# Patient Record
Sex: FEMALE | Race: WHITE | Employment: OTHER | URBAN - METROPOLITAN AREA
[De-identification: names, ages, dates, MRNs, and addresses within clinical notes are randomized per-mention and may not be internally consistent; named-entity substitution may affect disease eponyms.]

---

## 2018-07-30 ENCOUNTER — HOSPITAL ENCOUNTER (OUTPATIENT)
Dept: CT IMAGING | Age: 83
Discharge: HOME | End: 2018-07-30
Payer: MEDICARE

## 2018-07-30 DIAGNOSIS — I73.9 PVD (PERIPHERAL VASCULAR DISEASE) (HCC): ICD-10-CM

## 2018-07-30 LAB
BUN BLDV-MCNC: 14 MG/DL (ref 8–23)
CREAT SERPL-MCNC: 0.8 MG/DL (ref 0.5–1)
GFR AFRICAN AMERICAN: >60
GFR NON-AFRICAN AMERICAN: >60 ML/MIN/1.73

## 2018-07-30 PROCEDURE — 84520 ASSAY OF UREA NITROGEN: CPT

## 2018-07-30 PROCEDURE — 6360000004 HC RX CONTRAST MEDICATION: Performed by: RADIOLOGY

## 2018-07-30 PROCEDURE — 36415 COLL VENOUS BLD VENIPUNCTURE: CPT

## 2018-07-30 PROCEDURE — 75635 CT ANGIO ABDOMINAL ARTERIES: CPT

## 2018-07-30 PROCEDURE — 82565 ASSAY OF CREATININE: CPT

## 2018-07-30 RX ADMIN — IOPAMIDOL 150 ML: 755 INJECTION, SOLUTION INTRAVENOUS at 11:22

## 2018-11-16 ENCOUNTER — APPOINTMENT (OUTPATIENT)
Dept: GENERAL RADIOLOGY | Age: 83
DRG: 190 | End: 2018-11-16
Payer: MEDICARE

## 2018-11-16 ENCOUNTER — HOSPITAL ENCOUNTER (INPATIENT)
Age: 83
LOS: 1 days | Discharge: HOME | DRG: 190 | End: 2018-11-19
Attending: EMERGENCY MEDICINE | Admitting: INTERNAL MEDICINE
Payer: MEDICARE

## 2018-11-16 DIAGNOSIS — R09.02 HYPOXIA: ICD-10-CM

## 2018-11-16 DIAGNOSIS — J44.1 COPD EXACERBATION (HCC): Primary | ICD-10-CM

## 2018-11-16 LAB
ANION GAP SERPL CALCULATED.3IONS-SCNC: 14 MMOL/L (ref 7–16)
BASOPHILS ABSOLUTE: 0.04 E9/L (ref 0–0.2)
BASOPHILS RELATIVE PERCENT: 0.4 % (ref 0–2)
BUN BLDV-MCNC: 9 MG/DL (ref 8–23)
CALCIUM SERPL-MCNC: 9.4 MG/DL (ref 8.6–10.2)
CHLORIDE BLD-SCNC: 97 MMOL/L (ref 98–107)
CO2: 29 MMOL/L (ref 22–29)
CREAT SERPL-MCNC: 0.8 MG/DL (ref 0.5–1)
D DIMER: 233 NG/ML DDU
EOSINOPHILS ABSOLUTE: 0.08 E9/L (ref 0.05–0.5)
EOSINOPHILS RELATIVE PERCENT: 0.8 % (ref 0–6)
GFR AFRICAN AMERICAN: >60
GFR NON-AFRICAN AMERICAN: >60 ML/MIN/1.73
GLUCOSE BLD-MCNC: 123 MG/DL (ref 74–99)
HCT VFR BLD CALC: 41.7 % (ref 34–48)
HEMOGLOBIN: 13.9 G/DL (ref 11.5–15.5)
IMMATURE GRANULOCYTES #: 0.03 E9/L
IMMATURE GRANULOCYTES %: 0.3 % (ref 0–5)
LYMPHOCYTES ABSOLUTE: 1.83 E9/L (ref 1.5–4)
LYMPHOCYTES RELATIVE PERCENT: 18.1 % (ref 20–42)
MAGNESIUM: 1.4 MG/DL (ref 1.6–2.6)
MCH RBC QN AUTO: 30.6 PG (ref 26–35)
MCHC RBC AUTO-ENTMCNC: 33.3 % (ref 32–34.5)
MCV RBC AUTO: 91.9 FL (ref 80–99.9)
MONOCYTES ABSOLUTE: 0.71 E9/L (ref 0.1–0.95)
MONOCYTES RELATIVE PERCENT: 7 % (ref 2–12)
NEUTROPHILS ABSOLUTE: 7.4 E9/L (ref 1.8–7.3)
NEUTROPHILS RELATIVE PERCENT: 73.4 % (ref 43–80)
PDW BLD-RTO: 12.3 FL (ref 11.5–15)
PLATELET # BLD: 235 E9/L (ref 130–450)
PMV BLD AUTO: 10.6 FL (ref 7–12)
POTASSIUM REFLEX MAGNESIUM: 3.1 MMOL/L (ref 3.5–5)
RBC # BLD: 4.54 E12/L (ref 3.5–5.5)
SODIUM BLD-SCNC: 140 MMOL/L (ref 132–146)
TROPONIN: <0.01 NG/ML (ref 0–0.03)
WBC # BLD: 10.1 E9/L (ref 4.5–11.5)

## 2018-11-16 PROCEDURE — 85378 FIBRIN DEGRADE SEMIQUANT: CPT

## 2018-11-16 PROCEDURE — 83735 ASSAY OF MAGNESIUM: CPT

## 2018-11-16 PROCEDURE — 85025 COMPLETE CBC W/AUTO DIFF WBC: CPT

## 2018-11-16 PROCEDURE — 96365 THER/PROPH/DIAG IV INF INIT: CPT

## 2018-11-16 PROCEDURE — 6360000002 HC RX W HCPCS: Performed by: STUDENT IN AN ORGANIZED HEALTH CARE EDUCATION/TRAINING PROGRAM

## 2018-11-16 PROCEDURE — G0378 HOSPITAL OBSERVATION PER HR: HCPCS

## 2018-11-16 PROCEDURE — 2580000003 HC RX 258: Performed by: INTERNAL MEDICINE

## 2018-11-16 PROCEDURE — 6370000000 HC RX 637 (ALT 250 FOR IP): Performed by: STUDENT IN AN ORGANIZED HEALTH CARE EDUCATION/TRAINING PROGRAM

## 2018-11-16 PROCEDURE — 6370000000 HC RX 637 (ALT 250 FOR IP): Performed by: INTERNAL MEDICINE

## 2018-11-16 PROCEDURE — 96375 TX/PRO/DX INJ NEW DRUG ADDON: CPT

## 2018-11-16 PROCEDURE — 36415 COLL VENOUS BLD VENIPUNCTURE: CPT

## 2018-11-16 PROCEDURE — 94644 CONT INHLJ TX 1ST HOUR: CPT

## 2018-11-16 PROCEDURE — 71046 X-RAY EXAM CHEST 2 VIEWS: CPT

## 2018-11-16 PROCEDURE — 94640 AIRWAY INHALATION TREATMENT: CPT

## 2018-11-16 PROCEDURE — 80048 BASIC METABOLIC PNL TOTAL CA: CPT

## 2018-11-16 PROCEDURE — 99285 EMERGENCY DEPT VISIT HI MDM: CPT

## 2018-11-16 PROCEDURE — 94761 N-INVAS EAR/PLS OXIMETRY MLT: CPT

## 2018-11-16 PROCEDURE — 2580000003 HC RX 258: Performed by: EMERGENCY MEDICINE

## 2018-11-16 PROCEDURE — 6360000002 HC RX W HCPCS: Performed by: INTERNAL MEDICINE

## 2018-11-16 PROCEDURE — 84484 ASSAY OF TROPONIN QUANT: CPT

## 2018-11-16 RX ORDER — POTASSIUM CHLORIDE 7.45 MG/ML
10 INJECTION INTRAVENOUS ONCE
Status: COMPLETED | OUTPATIENT
Start: 2018-11-16 | End: 2018-11-16

## 2018-11-16 RX ORDER — AMLODIPINE BESYLATE 5 MG/1
5 TABLET ORAL DAILY
Status: DISCONTINUED | OUTPATIENT
Start: 2018-11-17 | End: 2018-11-19 | Stop reason: HOSPADM

## 2018-11-16 RX ORDER — VITAMIN E 268 MG
400 CAPSULE ORAL DAILY
Status: DISCONTINUED | OUTPATIENT
Start: 2018-11-17 | End: 2018-11-19 | Stop reason: HOSPADM

## 2018-11-16 RX ORDER — METHYLPREDNISOLONE SODIUM SUCCINATE 125 MG/2ML
125 INJECTION, POWDER, LYOPHILIZED, FOR SOLUTION INTRAMUSCULAR; INTRAVENOUS ONCE
Status: COMPLETED | OUTPATIENT
Start: 2018-11-16 | End: 2018-11-16

## 2018-11-16 RX ORDER — M-VIT,TX,IRON,MINS/CALC/FOLIC 27MG-0.4MG
1 TABLET ORAL DAILY
Status: DISCONTINUED | OUTPATIENT
Start: 2018-11-17 | End: 2018-11-19 | Stop reason: HOSPADM

## 2018-11-16 RX ORDER — EZETIMIBE 10 MG/1
10 TABLET ORAL DAILY
Status: DISCONTINUED | OUTPATIENT
Start: 2018-11-17 | End: 2018-11-19 | Stop reason: HOSPADM

## 2018-11-16 RX ORDER — METHYLPREDNISOLONE SODIUM SUCCINATE 40 MG/ML
40 INJECTION, POWDER, LYOPHILIZED, FOR SOLUTION INTRAMUSCULAR; INTRAVENOUS DAILY
Status: DISCONTINUED | OUTPATIENT
Start: 2018-11-17 | End: 2018-11-19

## 2018-11-16 RX ORDER — CLOPIDOGREL BISULFATE 75 MG/1
75 TABLET ORAL DAILY
Status: DISCONTINUED | OUTPATIENT
Start: 2018-11-17 | End: 2018-11-19 | Stop reason: HOSPADM

## 2018-11-16 RX ORDER — POTASSIUM CHLORIDE 20 MEQ/1
40 TABLET, EXTENDED RELEASE ORAL ONCE
Status: COMPLETED | OUTPATIENT
Start: 2018-11-16 | End: 2018-11-16

## 2018-11-16 RX ORDER — IPRATROPIUM BROMIDE AND ALBUTEROL SULFATE 2.5; .5 MG/3ML; MG/3ML
3 SOLUTION RESPIRATORY (INHALATION) ONCE
Status: COMPLETED | OUTPATIENT
Start: 2018-11-16 | End: 2018-11-16

## 2018-11-16 RX ORDER — IPRATROPIUM BROMIDE AND ALBUTEROL SULFATE 2.5; .5 MG/3ML; MG/3ML
1 SOLUTION RESPIRATORY (INHALATION) EVERY 6 HOURS
Status: DISCONTINUED | OUTPATIENT
Start: 2018-11-16 | End: 2018-11-19 | Stop reason: HOSPADM

## 2018-11-16 RX ORDER — MAGNESIUM SULFATE IN WATER 40 MG/ML
2 INJECTION, SOLUTION INTRAVENOUS ONCE
Status: COMPLETED | OUTPATIENT
Start: 2018-11-16 | End: 2018-11-16

## 2018-11-16 RX ORDER — FERROUS SULFATE 325(65) MG
325 TABLET ORAL
Status: DISCONTINUED | OUTPATIENT
Start: 2018-11-17 | End: 2018-11-19 | Stop reason: HOSPADM

## 2018-11-16 RX ORDER — HYDROCHLOROTHIAZIDE 25 MG/1
25 TABLET ORAL DAILY
Status: DISCONTINUED | OUTPATIENT
Start: 2018-11-17 | End: 2018-11-19 | Stop reason: HOSPADM

## 2018-11-16 RX ORDER — ATENOLOL 50 MG/1
100 TABLET ORAL DAILY
Status: DISCONTINUED | OUTPATIENT
Start: 2018-11-17 | End: 2018-11-19 | Stop reason: HOSPADM

## 2018-11-16 RX ORDER — 0.9 % SODIUM CHLORIDE 0.9 %
500 INTRAVENOUS SOLUTION INTRAVENOUS ONCE
Status: COMPLETED | OUTPATIENT
Start: 2018-11-16 | End: 2018-11-16

## 2018-11-16 RX ORDER — ATENOLOL AND CHLORTHALIDONE TABLET 100; 25 MG/1; MG/1
1 TABLET ORAL DAILY
Status: DISCONTINUED | OUTPATIENT
Start: 2018-11-17 | End: 2018-11-16 | Stop reason: CLARIF

## 2018-11-16 RX ORDER — POTASSIUM CHLORIDE 20 MEQ/1
20 TABLET, EXTENDED RELEASE ORAL
Status: DISCONTINUED | OUTPATIENT
Start: 2018-11-16 | End: 2018-11-19 | Stop reason: HOSPADM

## 2018-11-16 RX ORDER — MAGNESIUM SULFATE IN WATER 40 MG/ML
2 INJECTION, SOLUTION INTRAVENOUS ONCE
Status: DISCONTINUED | OUTPATIENT
Start: 2018-11-16 | End: 2018-11-16

## 2018-11-16 RX ORDER — MAGNESIUM SULFATE HEPTAHYDRATE 500 MG/ML
2 INJECTION, SOLUTION INTRAMUSCULAR; INTRAVENOUS ONCE
Status: DISCONTINUED | OUTPATIENT
Start: 2018-11-16 | End: 2018-11-16

## 2018-11-16 RX ORDER — OYSTER SHELL CALCIUM WITH VITAMIN D 500; 200 MG/1; [IU]/1
1 TABLET, FILM COATED ORAL DAILY
Status: DISCONTINUED | OUTPATIENT
Start: 2018-11-17 | End: 2018-11-19 | Stop reason: HOSPADM

## 2018-11-16 RX ADMIN — POTASSIUM CHLORIDE 40 MEQ: 20 TABLET, EXTENDED RELEASE ORAL at 18:16

## 2018-11-16 RX ADMIN — SODIUM CHLORIDE 500 ML: 9 INJECTION, SOLUTION INTRAVENOUS at 18:49

## 2018-11-16 RX ADMIN — POTASSIUM CHLORIDE 10 MEQ: 7.46 INJECTION, SOLUTION INTRAVENOUS at 18:49

## 2018-11-16 RX ADMIN — CEFTRIAXONE SODIUM 1 G: 1 INJECTION, POWDER, FOR SOLUTION INTRAMUSCULAR; INTRAVENOUS at 22:28

## 2018-11-16 RX ADMIN — METHYLPREDNISOLONE SODIUM SUCCINATE 125 MG: 125 INJECTION, POWDER, FOR SOLUTION INTRAMUSCULAR; INTRAVENOUS at 16:44

## 2018-11-16 RX ADMIN — IPRATROPIUM BROMIDE AND ALBUTEROL SULFATE 1 AMPULE: .5; 3 SOLUTION RESPIRATORY (INHALATION) at 22:46

## 2018-11-16 RX ADMIN — MAGNESIUM SULFATE IN WATER 2 G: 40 INJECTION, SOLUTION INTRAVENOUS at 18:16

## 2018-11-16 RX ADMIN — IPRATROPIUM BROMIDE AND ALBUTEROL SULFATE 3 AMPULE: .5; 3 SOLUTION RESPIRATORY (INHALATION) at 15:53

## 2018-11-16 ASSESSMENT — PAIN SCALES - GENERAL: PAINLEVEL_OUTOF10: 0

## 2018-11-16 ASSESSMENT — ENCOUNTER SYMPTOMS
CONSTIPATION: 0
WHEEZING: 1
VOMITING: 0
CHEST TIGHTNESS: 0
DIARRHEA: 0
BLOOD IN STOOL: 0
ABDOMINAL PAIN: 0
BACK PAIN: 0
SHORTNESS OF BREATH: 1
NAUSEA: 0
RHINORRHEA: 1
SORE THROAT: 0
COUGH: 1

## 2018-11-17 ENCOUNTER — APPOINTMENT (OUTPATIENT)
Dept: CT IMAGING | Age: 83
DRG: 190 | End: 2018-11-17
Payer: MEDICARE

## 2018-11-17 LAB
ALBUMIN SERPL-MCNC: 4 G/DL (ref 3.5–5.2)
ALP BLD-CCNC: 77 U/L (ref 35–104)
ALT SERPL-CCNC: 18 U/L (ref 0–32)
ANION GAP SERPL CALCULATED.3IONS-SCNC: 11 MMOL/L (ref 7–16)
AST SERPL-CCNC: 20 U/L (ref 0–31)
BASOPHILS ABSOLUTE: 0 E9/L (ref 0–0.2)
BASOPHILS RELATIVE PERCENT: 0.1 % (ref 0–2)
BILIRUB SERPL-MCNC: 0.5 MG/DL (ref 0–1.2)
BUN BLDV-MCNC: 11 MG/DL (ref 8–23)
CALCIUM SERPL-MCNC: 9.1 MG/DL (ref 8.6–10.2)
CHLORIDE BLD-SCNC: 99 MMOL/L (ref 98–107)
CO2: 28 MMOL/L (ref 22–29)
CREAT SERPL-MCNC: 0.6 MG/DL (ref 0.5–1)
EOSINOPHILS ABSOLUTE: 0 E9/L (ref 0.05–0.5)
EOSINOPHILS RELATIVE PERCENT: 0 % (ref 0–6)
GFR AFRICAN AMERICAN: >60
GFR NON-AFRICAN AMERICAN: >60 ML/MIN/1.73
GLUCOSE BLD-MCNC: 184 MG/DL (ref 74–99)
HCT VFR BLD CALC: 38.8 % (ref 34–48)
HEMOGLOBIN: 13.2 G/DL (ref 11.5–15.5)
LYMPHOCYTES ABSOLUTE: 0.07 E9/L (ref 1.5–4)
LYMPHOCYTES RELATIVE PERCENT: 0.9 % (ref 20–42)
MCH RBC QN AUTO: 31.1 PG (ref 26–35)
MCHC RBC AUTO-ENTMCNC: 34 % (ref 32–34.5)
MCV RBC AUTO: 91.5 FL (ref 80–99.9)
MONOCYTES ABSOLUTE: 0.07 E9/L (ref 0.1–0.95)
MONOCYTES RELATIVE PERCENT: 0.9 % (ref 2–12)
NEUTROPHILS ABSOLUTE: 6.86 E9/L (ref 1.8–7.3)
NEUTROPHILS RELATIVE PERCENT: 98.3 % (ref 43–80)
OVALOCYTES: ABNORMAL
PDW BLD-RTO: 12.3 FL (ref 11.5–15)
PLATELET # BLD: 201 E9/L (ref 130–450)
PMV BLD AUTO: 10.7 FL (ref 7–12)
POIKILOCYTES: ABNORMAL
POTASSIUM SERPL-SCNC: 3.8 MMOL/L (ref 3.5–5)
RBC # BLD: 4.24 E12/L (ref 3.5–5.5)
SODIUM BLD-SCNC: 138 MMOL/L (ref 132–146)
TOTAL PROTEIN: 6.6 G/DL (ref 6.4–8.3)
WBC # BLD: 7 E9/L (ref 4.5–11.5)

## 2018-11-17 PROCEDURE — 6360000004 HC RX CONTRAST MEDICATION: Performed by: RADIOLOGY

## 2018-11-17 PROCEDURE — 6370000000 HC RX 637 (ALT 250 FOR IP): Performed by: INTERNAL MEDICINE

## 2018-11-17 PROCEDURE — 96372 THER/PROPH/DIAG INJ SC/IM: CPT

## 2018-11-17 PROCEDURE — 2580000003 HC RX 258: Performed by: INTERNAL MEDICINE

## 2018-11-17 PROCEDURE — 85025 COMPLETE CBC W/AUTO DIFF WBC: CPT

## 2018-11-17 PROCEDURE — 71275 CT ANGIOGRAPHY CHEST: CPT

## 2018-11-17 PROCEDURE — G0378 HOSPITAL OBSERVATION PER HR: HCPCS

## 2018-11-17 PROCEDURE — 80053 COMPREHEN METABOLIC PANEL: CPT

## 2018-11-17 PROCEDURE — 94640 AIRWAY INHALATION TREATMENT: CPT

## 2018-11-17 PROCEDURE — 6360000002 HC RX W HCPCS: Performed by: INTERNAL MEDICINE

## 2018-11-17 PROCEDURE — 36415 COLL VENOUS BLD VENIPUNCTURE: CPT

## 2018-11-17 PROCEDURE — 96376 TX/PRO/DX INJ SAME DRUG ADON: CPT

## 2018-11-17 PROCEDURE — 2700000000 HC OXYGEN THERAPY PER DAY

## 2018-11-17 RX ADMIN — CLOPIDOGREL BISULFATE 75 MG: 75 TABLET ORAL at 08:52

## 2018-11-17 RX ADMIN — MULTIPLE VITAMINS W/ MINERALS TAB 1 TABLET: TAB at 08:51

## 2018-11-17 RX ADMIN — HYDROCHLOROTHIAZIDE 25 MG: 25 TABLET ORAL at 08:52

## 2018-11-17 RX ADMIN — IOPAMIDOL 80 ML: 755 INJECTION, SOLUTION INTRAVENOUS at 17:23

## 2018-11-17 RX ADMIN — IPRATROPIUM BROMIDE AND ALBUTEROL SULFATE 1 AMPULE: .5; 3 SOLUTION RESPIRATORY (INHALATION) at 21:35

## 2018-11-17 RX ADMIN — IPRATROPIUM BROMIDE AND ALBUTEROL SULFATE 1 AMPULE: .5; 3 SOLUTION RESPIRATORY (INHALATION) at 16:19

## 2018-11-17 RX ADMIN — ATENOLOL 100 MG: 50 TABLET ORAL at 08:52

## 2018-11-17 RX ADMIN — ENOXAPARIN SODIUM 40 MG: 40 INJECTION SUBCUTANEOUS at 08:53

## 2018-11-17 RX ADMIN — METHYLPREDNISOLONE SODIUM SUCCINATE 40 MG: 40 INJECTION, POWDER, FOR SOLUTION INTRAMUSCULAR; INTRAVENOUS at 08:52

## 2018-11-17 RX ADMIN — FERROUS SULFATE TAB 325 MG (65 MG ELEMENTAL FE) 325 MG: 325 (65 FE) TAB at 08:52

## 2018-11-17 RX ADMIN — AMLODIPINE BESYLATE 5 MG: 5 TABLET ORAL at 08:48

## 2018-11-17 RX ADMIN — VITAMIN E CAP 400 UNIT 400 UNITS: 400 CAP at 17:53

## 2018-11-17 RX ADMIN — OYSTER SHELL CALCIUM WITH VITAMIN D 1 TABLET: 500; 200 TABLET, FILM COATED ORAL at 08:52

## 2018-11-17 RX ADMIN — IPRATROPIUM BROMIDE AND ALBUTEROL SULFATE 1 AMPULE: .5; 3 SOLUTION RESPIRATORY (INHALATION) at 05:57

## 2018-11-17 RX ADMIN — CEFTRIAXONE SODIUM 1 G: 1 INJECTION, POWDER, FOR SOLUTION INTRAMUSCULAR; INTRAVENOUS at 22:00

## 2018-11-17 RX ADMIN — EZETIMIBE 10 MG: 10 TABLET ORAL at 17:53

## 2018-11-17 RX ADMIN — IPRATROPIUM BROMIDE AND ALBUTEROL SULFATE 1 AMPULE: .5; 3 SOLUTION RESPIRATORY (INHALATION) at 11:14

## 2018-11-17 ASSESSMENT — PAIN SCALES - GENERAL
PAINLEVEL_OUTOF10: 0

## 2018-11-18 PROBLEM — J44.1 COPD WITH ACUTE EXACERBATION (HCC): Status: ACTIVE | Noted: 2018-11-18

## 2018-11-18 LAB
ALBUMIN SERPL-MCNC: 3.7 G/DL (ref 3.5–5.2)
ALP BLD-CCNC: 74 U/L (ref 35–104)
ALT SERPL-CCNC: 18 U/L (ref 0–32)
ANION GAP SERPL CALCULATED.3IONS-SCNC: 13 MMOL/L (ref 7–16)
AST SERPL-CCNC: 27 U/L (ref 0–31)
BASOPHILS ABSOLUTE: 0.03 E9/L (ref 0–0.2)
BASOPHILS RELATIVE PERCENT: 0.2 % (ref 0–2)
BILIRUB SERPL-MCNC: 0.3 MG/DL (ref 0–1.2)
BUN BLDV-MCNC: 12 MG/DL (ref 8–23)
CALCIUM SERPL-MCNC: 9.3 MG/DL (ref 8.6–10.2)
CHLORIDE BLD-SCNC: 100 MMOL/L (ref 98–107)
CO2: 27 MMOL/L (ref 22–29)
CREAT SERPL-MCNC: 0.7 MG/DL (ref 0.5–1)
EOSINOPHILS ABSOLUTE: 0.01 E9/L (ref 0.05–0.5)
EOSINOPHILS RELATIVE PERCENT: 0.1 % (ref 0–6)
GFR AFRICAN AMERICAN: >60
GFR NON-AFRICAN AMERICAN: >60 ML/MIN/1.73
GLUCOSE BLD-MCNC: 94 MG/DL (ref 74–99)
HCT VFR BLD CALC: 41.6 % (ref 34–48)
HEMOGLOBIN: 13.6 G/DL (ref 11.5–15.5)
IMMATURE GRANULOCYTES #: 0.09 E9/L
IMMATURE GRANULOCYTES %: 0.6 % (ref 0–5)
LYMPHOCYTES ABSOLUTE: 1.29 E9/L (ref 1.5–4)
LYMPHOCYTES RELATIVE PERCENT: 9 % (ref 20–42)
MCH RBC QN AUTO: 30.6 PG (ref 26–35)
MCHC RBC AUTO-ENTMCNC: 32.7 % (ref 32–34.5)
MCV RBC AUTO: 93.7 FL (ref 80–99.9)
MONOCYTES ABSOLUTE: 0.86 E9/L (ref 0.1–0.95)
MONOCYTES RELATIVE PERCENT: 6 % (ref 2–12)
NEUTROPHILS ABSOLUTE: 12.12 E9/L (ref 1.8–7.3)
NEUTROPHILS RELATIVE PERCENT: 84.1 % (ref 43–80)
PDW BLD-RTO: 12.8 FL (ref 11.5–15)
PLATELET # BLD: 200 E9/L (ref 130–450)
PMV BLD AUTO: 10.9 FL (ref 7–12)
POTASSIUM SERPL-SCNC: 3.9 MMOL/L (ref 3.5–5)
RBC # BLD: 4.44 E12/L (ref 3.5–5.5)
SODIUM BLD-SCNC: 140 MMOL/L (ref 132–146)
TOTAL PROTEIN: 6.6 G/DL (ref 6.4–8.3)
WBC # BLD: 14.4 E9/L (ref 4.5–11.5)

## 2018-11-18 PROCEDURE — 36415 COLL VENOUS BLD VENIPUNCTURE: CPT

## 2018-11-18 PROCEDURE — 96372 THER/PROPH/DIAG INJ SC/IM: CPT

## 2018-11-18 PROCEDURE — 2700000000 HC OXYGEN THERAPY PER DAY

## 2018-11-18 PROCEDURE — 6370000000 HC RX 637 (ALT 250 FOR IP): Performed by: INTERNAL MEDICINE

## 2018-11-18 PROCEDURE — 85025 COMPLETE CBC W/AUTO DIFF WBC: CPT

## 2018-11-18 PROCEDURE — 1200000000 HC SEMI PRIVATE

## 2018-11-18 PROCEDURE — 80053 COMPREHEN METABOLIC PANEL: CPT

## 2018-11-18 PROCEDURE — 2580000003 HC RX 258: Performed by: INTERNAL MEDICINE

## 2018-11-18 PROCEDURE — 6360000002 HC RX W HCPCS: Performed by: INTERNAL MEDICINE

## 2018-11-18 PROCEDURE — 96376 TX/PRO/DX INJ SAME DRUG ADON: CPT

## 2018-11-18 PROCEDURE — 94640 AIRWAY INHALATION TREATMENT: CPT

## 2018-11-18 RX ADMIN — AMLODIPINE BESYLATE 5 MG: 5 TABLET ORAL at 08:59

## 2018-11-18 RX ADMIN — IPRATROPIUM BROMIDE AND ALBUTEROL SULFATE 1 AMPULE: .5; 3 SOLUTION RESPIRATORY (INHALATION) at 22:43

## 2018-11-18 RX ADMIN — ATENOLOL 100 MG: 50 TABLET ORAL at 08:58

## 2018-11-18 RX ADMIN — ENOXAPARIN SODIUM 40 MG: 40 INJECTION SUBCUTANEOUS at 08:59

## 2018-11-18 RX ADMIN — CEFTRIAXONE SODIUM 1 G: 1 INJECTION, POWDER, FOR SOLUTION INTRAMUSCULAR; INTRAVENOUS at 21:52

## 2018-11-18 RX ADMIN — IPRATROPIUM BROMIDE AND ALBUTEROL SULFATE 1 AMPULE: .5; 3 SOLUTION RESPIRATORY (INHALATION) at 05:12

## 2018-11-18 RX ADMIN — MULTIPLE VITAMINS W/ MINERALS TAB 1 TABLET: TAB at 08:58

## 2018-11-18 RX ADMIN — HYDROCHLOROTHIAZIDE 25 MG: 25 TABLET ORAL at 08:59

## 2018-11-18 RX ADMIN — IPRATROPIUM BROMIDE AND ALBUTEROL SULFATE 1 AMPULE: .5; 3 SOLUTION RESPIRATORY (INHALATION) at 12:04

## 2018-11-18 RX ADMIN — METHYLPREDNISOLONE SODIUM SUCCINATE 40 MG: 40 INJECTION, POWDER, FOR SOLUTION INTRAMUSCULAR; INTRAVENOUS at 09:00

## 2018-11-18 RX ADMIN — CLOPIDOGREL BISULFATE 75 MG: 75 TABLET ORAL at 08:58

## 2018-11-18 RX ADMIN — FERROUS SULFATE TAB 325 MG (65 MG ELEMENTAL FE) 325 MG: 325 (65 FE) TAB at 08:59

## 2018-11-18 RX ADMIN — IPRATROPIUM BROMIDE AND ALBUTEROL SULFATE 1 AMPULE: .5; 3 SOLUTION RESPIRATORY (INHALATION) at 17:13

## 2018-11-18 RX ADMIN — EZETIMIBE 10 MG: 10 TABLET ORAL at 08:58

## 2018-11-18 RX ADMIN — OYSTER SHELL CALCIUM WITH VITAMIN D 1 TABLET: 500; 200 TABLET, FILM COATED ORAL at 08:59

## 2018-11-18 RX ADMIN — VITAMIN E CAP 400 UNIT 400 UNITS: 400 CAP at 08:58

## 2018-11-18 ASSESSMENT — PAIN SCALES - GENERAL
PAINLEVEL_OUTOF10: 0

## 2018-11-19 VITALS
HEIGHT: 62 IN | DIASTOLIC BLOOD PRESSURE: 60 MMHG | SYSTOLIC BLOOD PRESSURE: 136 MMHG | HEART RATE: 72 BPM | RESPIRATION RATE: 16 BRPM | WEIGHT: 172.9 LBS | BODY MASS INDEX: 31.82 KG/M2 | TEMPERATURE: 97.4 F | OXYGEN SATURATION: 99 %

## 2018-11-19 LAB
ALBUMIN SERPL-MCNC: 4.1 G/DL (ref 3.5–5.2)
ALP BLD-CCNC: 74 U/L (ref 35–104)
ALT SERPL-CCNC: 18 U/L (ref 0–32)
ANION GAP SERPL CALCULATED.3IONS-SCNC: 10 MMOL/L (ref 7–16)
AST SERPL-CCNC: 20 U/L (ref 0–31)
BASOPHILS ABSOLUTE: 0.03 E9/L (ref 0–0.2)
BASOPHILS RELATIVE PERCENT: 0.3 % (ref 0–2)
BILIRUB SERPL-MCNC: 0.3 MG/DL (ref 0–1.2)
BUN BLDV-MCNC: 13 MG/DL (ref 8–23)
CALCIUM SERPL-MCNC: 9.6 MG/DL (ref 8.6–10.2)
CHLORIDE BLD-SCNC: 97 MMOL/L (ref 98–107)
CO2: 36 MMOL/L (ref 22–29)
CREAT SERPL-MCNC: 0.7 MG/DL (ref 0.5–1)
EOSINOPHILS ABSOLUTE: 0.01 E9/L (ref 0.05–0.5)
EOSINOPHILS RELATIVE PERCENT: 0.1 % (ref 0–6)
GFR AFRICAN AMERICAN: >60
GFR NON-AFRICAN AMERICAN: >60 ML/MIN/1.73
GLUCOSE BLD-MCNC: 89 MG/DL (ref 74–99)
HCT VFR BLD CALC: 40.2 % (ref 34–48)
HEMOGLOBIN: 13.5 G/DL (ref 11.5–15.5)
IMMATURE GRANULOCYTES #: 0.04 E9/L
IMMATURE GRANULOCYTES %: 0.4 % (ref 0–5)
LYMPHOCYTES ABSOLUTE: 1.68 E9/L (ref 1.5–4)
LYMPHOCYTES RELATIVE PERCENT: 15.2 % (ref 20–42)
MCH RBC QN AUTO: 31.3 PG (ref 26–35)
MCHC RBC AUTO-ENTMCNC: 33.6 % (ref 32–34.5)
MCV RBC AUTO: 93.1 FL (ref 80–99.9)
MONOCYTES ABSOLUTE: 0.66 E9/L (ref 0.1–0.95)
MONOCYTES RELATIVE PERCENT: 6 % (ref 2–12)
NEUTROPHILS ABSOLUTE: 8.65 E9/L (ref 1.8–7.3)
NEUTROPHILS RELATIVE PERCENT: 78 % (ref 43–80)
PDW BLD-RTO: 12.5 FL (ref 11.5–15)
PLATELET # BLD: 209 E9/L (ref 130–450)
PMV BLD AUTO: 10.7 FL (ref 7–12)
POTASSIUM SERPL-SCNC: 4 MMOL/L (ref 3.5–5)
RBC # BLD: 4.32 E12/L (ref 3.5–5.5)
SODIUM BLD-SCNC: 143 MMOL/L (ref 132–146)
TOTAL PROTEIN: 6.8 G/DL (ref 6.4–8.3)
WBC # BLD: 11.1 E9/L (ref 4.5–11.5)

## 2018-11-19 PROCEDURE — 80053 COMPREHEN METABOLIC PANEL: CPT

## 2018-11-19 PROCEDURE — 6360000002 HC RX W HCPCS: Performed by: INTERNAL MEDICINE

## 2018-11-19 PROCEDURE — 36415 COLL VENOUS BLD VENIPUNCTURE: CPT

## 2018-11-19 PROCEDURE — 6370000000 HC RX 637 (ALT 250 FOR IP): Performed by: INTERNAL MEDICINE

## 2018-11-19 PROCEDURE — 2700000000 HC OXYGEN THERAPY PER DAY

## 2018-11-19 PROCEDURE — 96376 TX/PRO/DX INJ SAME DRUG ADON: CPT

## 2018-11-19 PROCEDURE — 96372 THER/PROPH/DIAG INJ SC/IM: CPT

## 2018-11-19 PROCEDURE — 85025 COMPLETE CBC W/AUTO DIFF WBC: CPT

## 2018-11-19 PROCEDURE — 94640 AIRWAY INHALATION TREATMENT: CPT

## 2018-11-19 RX ORDER — PREDNISONE 10 MG/1
30 TABLET ORAL DAILY
Qty: 30 TABLET | Refills: 0 | Status: SHIPPED | OUTPATIENT
Start: 2018-11-20 | End: 2018-11-30

## 2018-11-19 RX ORDER — ALBUTEROL SULFATE 90 UG/1
2 AEROSOL, METERED RESPIRATORY (INHALATION) 4 TIMES DAILY PRN
Qty: 1 INHALER | Refills: 0 | Status: SHIPPED | OUTPATIENT
Start: 2018-11-19 | End: 2021-04-05

## 2018-11-19 RX ADMIN — IPRATROPIUM BROMIDE AND ALBUTEROL SULFATE 1 AMPULE: .5; 3 SOLUTION RESPIRATORY (INHALATION) at 06:08

## 2018-11-19 RX ADMIN — MULTIPLE VITAMINS W/ MINERALS TAB 1 TABLET: TAB at 08:45

## 2018-11-19 RX ADMIN — POTASSIUM CHLORIDE 20 MEQ: 20 TABLET, EXTENDED RELEASE ORAL at 08:45

## 2018-11-19 RX ADMIN — OYSTER SHELL CALCIUM WITH VITAMIN D 1 TABLET: 500; 200 TABLET, FILM COATED ORAL at 08:43

## 2018-11-19 RX ADMIN — CLOPIDOGREL BISULFATE 75 MG: 75 TABLET ORAL at 08:43

## 2018-11-19 RX ADMIN — IPRATROPIUM BROMIDE AND ALBUTEROL SULFATE 1 AMPULE: .5; 3 SOLUTION RESPIRATORY (INHALATION) at 09:38

## 2018-11-19 RX ADMIN — FERROUS SULFATE TAB 325 MG (65 MG ELEMENTAL FE) 325 MG: 325 (65 FE) TAB at 07:46

## 2018-11-19 RX ADMIN — METHYLPREDNISOLONE SODIUM SUCCINATE 40 MG: 40 INJECTION, POWDER, FOR SOLUTION INTRAMUSCULAR; INTRAVENOUS at 08:53

## 2018-11-19 RX ADMIN — EZETIMIBE 10 MG: 10 TABLET ORAL at 08:44

## 2018-11-19 RX ADMIN — ATENOLOL 100 MG: 50 TABLET ORAL at 08:42

## 2018-11-19 RX ADMIN — AMLODIPINE BESYLATE 5 MG: 5 TABLET ORAL at 08:42

## 2018-11-19 RX ADMIN — ENOXAPARIN SODIUM 40 MG: 40 INJECTION SUBCUTANEOUS at 08:43

## 2018-11-19 RX ADMIN — VITAMIN E CAP 400 UNIT 400 UNITS: 400 CAP at 08:46

## 2018-11-19 RX ADMIN — HYDROCHLOROTHIAZIDE 25 MG: 25 TABLET ORAL at 08:43

## 2018-11-19 RX ADMIN — IPRATROPIUM BROMIDE AND ALBUTEROL SULFATE 1 AMPULE: .5; 3 SOLUTION RESPIRATORY (INHALATION) at 18:04

## 2018-11-19 ASSESSMENT — ENCOUNTER SYMPTOMS
SHORTNESS OF BREATH: 1
SHORTNESS OF BREATH: 0

## 2018-11-19 ASSESSMENT — PAIN SCALES - GENERAL
PAINLEVEL_OUTOF10: 0
PAINLEVEL_OUTOF10: 0

## 2018-11-23 LAB
EKG ATRIAL RATE: 73 BPM
EKG P AXIS: 52 DEGREES
EKG P-R INTERVAL: 160 MS
EKG Q-T INTERVAL: 418 MS
EKG QRS DURATION: 62 MS
EKG QTC CALCULATION (BAZETT): 460 MS
EKG R AXIS: 8 DEGREES
EKG T AXIS: 29 DEGREES
EKG VENTRICULAR RATE: 73 BPM

## 2020-01-14 ENCOUNTER — HOSPITAL ENCOUNTER (OUTPATIENT)
Age: 85
Discharge: HOME | End: 2020-01-14
Payer: MEDICARE

## 2020-01-14 LAB
HCT VFR BLD CALC: 37.5 % (ref 34–48)
HEMOGLOBIN: 12.4 G/DL (ref 11.5–15.5)
MCH RBC QN AUTO: 30 PG (ref 26–35)
MCHC RBC AUTO-ENTMCNC: 33.1 % (ref 32–34.5)
MCV RBC AUTO: 90.6 FL (ref 80–99.9)
PDW BLD-RTO: 12.2 FL (ref 11.5–15)
PLATELET # BLD: 214 E9/L (ref 130–450)
PMV BLD AUTO: 10.7 FL (ref 7–12)
RBC # BLD: 4.14 E12/L (ref 3.5–5.5)
WBC # BLD: 7.2 E9/L (ref 4.5–11.5)

## 2020-01-14 PROCEDURE — 85027 COMPLETE CBC AUTOMATED: CPT

## 2020-01-14 PROCEDURE — 36415 COLL VENOUS BLD VENIPUNCTURE: CPT

## 2021-03-22 ENCOUNTER — PREP FOR PROCEDURE (OUTPATIENT)
Dept: VASCULAR SURGERY | Age: 86
End: 2021-03-22

## 2021-03-22 RX ORDER — SODIUM CHLORIDE 0.9 % (FLUSH) 0.9 %
10 SYRINGE (ML) INJECTION PRN
Status: CANCELLED | OUTPATIENT
Start: 2021-03-22

## 2021-03-22 RX ORDER — SODIUM CHLORIDE 9 MG/ML
INJECTION, SOLUTION INTRAVENOUS CONTINUOUS
Status: CANCELLED | OUTPATIENT
Start: 2021-03-22

## 2021-04-05 ENCOUNTER — HOSPITAL ENCOUNTER (OUTPATIENT)
Age: 86
Discharge: HOME | End: 2021-04-07
Payer: MEDICARE

## 2021-04-05 ENCOUNTER — HOSPITAL ENCOUNTER (OUTPATIENT)
Dept: PREADMISSION TESTING | Age: 86
Discharge: HOME | End: 2021-04-05
Payer: MEDICARE

## 2021-04-05 VITALS
WEIGHT: 170 LBS | SYSTOLIC BLOOD PRESSURE: 134 MMHG | OXYGEN SATURATION: 97 % | RESPIRATION RATE: 16 BRPM | HEIGHT: 62 IN | HEART RATE: 49 BPM | BODY MASS INDEX: 31.28 KG/M2 | DIASTOLIC BLOOD PRESSURE: 63 MMHG | TEMPERATURE: 97.6 F

## 2021-04-05 DIAGNOSIS — Z01.818 PREOP TESTING: ICD-10-CM

## 2021-04-05 LAB
ALBUMIN SERPL-MCNC: 3.9 G/DL (ref 3.5–5.2)
ALP BLD-CCNC: 76 U/L (ref 35–104)
ALT SERPL-CCNC: 15 U/L (ref 0–32)
ANION GAP SERPL CALCULATED.3IONS-SCNC: 8 MMOL/L (ref 7–16)
APTT: 29.9 SEC (ref 24.5–35.1)
AST SERPL-CCNC: 20 U/L (ref 0–31)
BILIRUB SERPL-MCNC: 0.6 MG/DL (ref 0–1.2)
BUN BLDV-MCNC: 12 MG/DL (ref 8–23)
CALCIUM SERPL-MCNC: 9.5 MG/DL (ref 8.6–10.2)
CHLORIDE BLD-SCNC: 103 MMOL/L (ref 98–107)
CO2: 30 MMOL/L (ref 22–29)
CREAT SERPL-MCNC: 0.7 MG/DL (ref 0.5–1)
GFR AFRICAN AMERICAN: >60
GFR NON-AFRICAN AMERICAN: >60 ML/MIN/1.73
GLUCOSE BLD-MCNC: 105 MG/DL (ref 74–99)
HCT VFR BLD CALC: 38.5 % (ref 34–48)
HEMOGLOBIN: 12.9 G/DL (ref 11.5–15.5)
INR BLD: 1.1
POTASSIUM SERPL-SCNC: 3.7 MMOL/L (ref 3.5–5)
PROTHROMBIN TIME: 12.4 SEC (ref 9.3–12.4)
SODIUM BLD-SCNC: 141 MMOL/L (ref 132–146)
TOTAL PROTEIN: 6.2 G/DL (ref 6.4–8.3)

## 2021-04-05 PROCEDURE — 85730 THROMBOPLASTIN TIME PARTIAL: CPT

## 2021-04-05 PROCEDURE — 85014 HEMATOCRIT: CPT

## 2021-04-05 PROCEDURE — 80053 COMPREHEN METABOLIC PANEL: CPT

## 2021-04-05 PROCEDURE — U0005 INFEC AGEN DETEC AMPLI PROBE: HCPCS

## 2021-04-05 PROCEDURE — 85018 HEMOGLOBIN: CPT

## 2021-04-05 PROCEDURE — 85610 PROTHROMBIN TIME: CPT

## 2021-04-05 PROCEDURE — 36415 COLL VENOUS BLD VENIPUNCTURE: CPT

## 2021-04-05 PROCEDURE — U0003 INFECTIOUS AGENT DETECTION BY NUCLEIC ACID (DNA OR RNA); SEVERE ACUTE RESPIRATORY SYNDROME CORONAVIRUS 2 (SARS-COV-2) (CORONAVIRUS DISEASE [COVID-19]), AMPLIFIED PROBE TECHNIQUE, MAKING USE OF HIGH THROUGHPUT TECHNOLOGIES AS DESCRIBED BY CMS-2020-01-R: HCPCS

## 2021-04-05 NOTE — PROGRESS NOTES
[address] [facility]                                                                                                                    PRE OP INSTRUCTIONS FOR  [address]        Date: 4/5/2021    Date of surgery: 3/9/2021   Arrival Time:      8 am    1. Do not eat or drink anything after midnight prior to surgery. This includes no water, chewing gum, mints or ice chips. 2. Take the following medications with a small sip of water on the morning of Surgery: take all morning medications with water     3. Check with your Doctor regarding stopping Plavix, Coumadin, Lovenox, Eliquis, Effient, or other blood thinners. Continue plavix    4. Do not smoke,use illicit drugs and do not drink any alcoholic beverages 24 hours prior to surgery. 5. You may brush your teeth the morning of surgery. DO NOT SWALLOW WATER    6. You MUST make arrangements for a responsible adult to take you home after your surgery. You will not be allowed to leave alone or drive yourself home. It is strongly suggested someone stay with you the first 24 hrs. Your surgery will be cancelled if you do not have a ride home. 7. Please wear simple, loose fitting clothing to the hospital.  [facility] [place] not bring valuables (money, credit cards, checkbooks, etc.) Do not wear any makeup (including no eye makeup) or nail polish on your fingers or toes. 8. DO NOT wear any jewelry or piercings on day of surgery. All body piercing jewelry must be removed. 9. Shower the night before surgery with _x__Antibacterial soap /YOEL WIPES________          10. Notify your Surgeon if you develop any illness between now and surgery time, cough, cold, fever, sore throat, nausea, vomiting, etc.  Please notify your surgeon if you experience dizziness, shortness of breath or blurred vision between now & the time of your surgery. 11. If you have ___dentures, they will be removed before going to the OR; we will provide you a container.  If you wear ___contact lenses or _x__glasses, they will be removed; please bring a case for them. 12. To provide excellent care visitors will be limited to  1 in the room at any given time. 13. During flu season no children under the age of 15 are permitted in the hospital for the safety of all patients. 14. Other come in main lobby and stop at                  Please call AMBULATORY CARE if you have any further questions.    [address] [facility]     [address]

## 2021-04-06 LAB
SARS-COV-2: NOT DETECTED
SOURCE: NORMAL

## 2021-04-08 ENCOUNTER — PREP FOR PROCEDURE (OUTPATIENT)
Dept: VASCULAR SURGERY | Age: 86
End: 2021-04-08

## 2021-04-08 NOTE — H&P
Stephan Camp  4/8/2021      H&P    CHIEF COMPLAINT:  PVD  HISTORY OF PRESENT ILLNESS:  Stephan Camp is a 82 y.o.  female     Weight:  166 lbs. Past Medical History: She has a past medical history of Arthritis, History of PSVT (paroxysmal supraventricular tachycardia), [place] (hard of hearing), Hyperlipidemia, Hypertension, Normal nuclear stress test, Thyroid disease, and Vertigo. Past Surgical History: She has a past surgical history that includes Carotid endarterectomy (2004?); Hysterectomy; Pilonidal cyst excision (1960's); Tonsillectomy; Breast surgery (2646'Y); eye surgery (2010); Cardiac catheterization (1996?); vascular surgery; Upper gastrointestinal endoscopy (06/29/2016); Colonoscopy (2003); and Colonoscopy (07/07/2016). Allergies: Benazepril, Lipitor, Losartan, Pantoprazole, Pravachol [pravastatin sodium], and Zocor [simvastatin - high dose]    Home Medicines:   Prior to Visit Medications    Medication Sig Taking? Authorizing Provider   ezetimibe (ZETIA) 10 MG tablet Take 10 mg by mouth daily. am   Historical Provider, MD   amlodipine (NORVASC) 10 MG tablet Take 5 mg by mouth daily. Historical Provider, MD   atenolol-chlorthalidone (TENORETIC) 100-25 MG per tablet Take 1 tablet by mouth daily. am   Historical Provider, MD   potassium chloride SA (K-DUR;KLOR-CON) 20 MEQ tablet Take 20 mEq by mouth three times a week. Mon,Wed,Fri   Historical Provider, MD   clopidogrel (PLAVIX) 75 MG tablet Take 75 mg by mouth daily. Historical Provider, MD   Calcium + D (CALTRATE) 600-200 MG-UNIT tablet Take 1 tablet by mouth daily. Historical Provider, MD   Multiple Vitamins-Calcium (ONE-A-DAY WOMENS FORMULA PO) Take 1 tablet by mouth daily. Historical Provider, MD   vitamin E 400 UNIT capsule Take 400 Units by mouth daily. Historical Provider, MD       Social History:   TOBACCO:   reports that she has been smoking cigarettes. She has a 54.00 pack-year smoking history.  She has never used smokeless tobacco.  ETOH:    reports no history of alcohol use. No family history on file. REVIEW OF SYSTEMS:  Constitutional: negative  Respiratory: negative  Cardiovascular: negative  Gastrointestinal: negative  Musculoskeletal:negative  Behavioral/Psych: negative  All others reviewed, negative    Recent Labs     04/05/21  1220   HGB 12.9         K 3.7   BUN 12   CREATININE 0.7   GLUCOSE 105*   PROTIME 12.4   INR 1.1   LABALBU 3.9   PROT 6.2*   CALCIUM 9.5   BILITOT 0.6   ALKPHOS 76   AST 20   ALT 15       Physical exam:   Constitutional: She appears well-developed and well-nourished. No distress. HENT:   Head: Normocephalic and atraumatic. Mouth/Throat: No oropharyngeal exudate. Eyes: Pupils are equal, round, and reactive to light. Right eye exhibits no discharge. Left eye exhibits no discharge. No scleral icterus. Neck: Normal range of motion. Neck supple. No JVD present. No thyromegaly present. Cardiovascular: Normal rate.  Exam reveals no gallop and no friction rub.    No murmur heard. Pulmonary/Chest: Effort normal. No stridor. No respiratory distress. She has wheezes. She has no rales. She exhibits no tenderness. Diffuse wheezes all lung fields.   Abdominal: Soft. She exhibits no distension and no mass. There is no tenderness. There is no rebound and no guarding. No hernia. Musculoskeletal: Normal range of motion. She exhibits no edema, tenderness or deformity. Lymphadenopathy:     She has no cervical adenopathy. Neurological: She is alert. No cranial nerve deficit. Skin: Skin is warm and dry. Capillary refill takes less than 2 seconds. No rash noted. She is not diaphoretic. No erythema. No pallor. Psychiatric: She has a normal mood and affect. Her behavior is normal.   Nursing note and vitals reviewed.     ASSESSMENT: PVD    PLAN: Left Femoral Angiogram, PTA, Stent, Atherectomy    Signed: Dr. Ariela Vigil M.D.

## 2021-04-09 ENCOUNTER — HOSPITAL ENCOUNTER (OUTPATIENT)
Dept: INTERVENTIONAL RADIOLOGY/VASCULAR | Age: 86
Discharge: HOME | End: 2021-04-09
Payer: MEDICARE

## 2021-04-09 VITALS
OXYGEN SATURATION: 97 % | SYSTOLIC BLOOD PRESSURE: 146 MMHG | DIASTOLIC BLOOD PRESSURE: 64 MMHG | BODY MASS INDEX: 31.28 KG/M2 | WEIGHT: 170 LBS | TEMPERATURE: 97.5 F | HEIGHT: 62 IN | HEART RATE: 61 BPM | RESPIRATION RATE: 16 BRPM

## 2021-04-09 DIAGNOSIS — I73.9 PVD (PERIPHERAL VASCULAR DISEASE) (HCC): ICD-10-CM

## 2021-04-09 LAB — POC ACT LR: 324 SECONDS

## 2021-04-09 PROCEDURE — 6360000002 HC RX W HCPCS: Performed by: THORACIC SURGERY (CARDIOTHORACIC VASCULAR SURGERY)

## 2021-04-09 PROCEDURE — 2500000003 HC RX 250 WO HCPCS

## 2021-04-09 PROCEDURE — 7100000010 HC PHASE II RECOVERY - FIRST 15 MIN

## 2021-04-09 PROCEDURE — 85347 COAGULATION TIME ACTIVATED: CPT

## 2021-04-09 PROCEDURE — 37225 HC FEM POP TERRITORY ATHERECTOMY: CPT | Performed by: THORACIC SURGERY (CARDIOTHORACIC VASCULAR SURGERY)

## 2021-04-09 PROCEDURE — 6360000004 HC RX CONTRAST MEDICATION: Performed by: THORACIC SURGERY (CARDIOTHORACIC VASCULAR SURGERY)

## 2021-04-09 PROCEDURE — 2580000003 HC RX 258: Performed by: THORACIC SURGERY (CARDIOTHORACIC VASCULAR SURGERY)

## 2021-04-09 PROCEDURE — 7100000011 HC PHASE II RECOVERY - ADDTL 15 MIN

## 2021-04-09 PROCEDURE — C2623 CATH, TRANSLUMIN, DRUG-COAT: HCPCS

## 2021-04-09 RX ORDER — FENTANYL CITRATE 50 UG/ML
INJECTION, SOLUTION INTRAMUSCULAR; INTRAVENOUS
Status: COMPLETED | OUTPATIENT
Start: 2021-04-09 | End: 2021-04-09

## 2021-04-09 RX ORDER — SODIUM CHLORIDE 0.9 % (FLUSH) 0.9 %
10 SYRINGE (ML) INJECTION PRN
Status: DISCONTINUED | OUTPATIENT
Start: 2021-04-09 | End: 2021-04-10 | Stop reason: HOSPADM

## 2021-04-09 RX ORDER — MIDAZOLAM HYDROCHLORIDE 1 MG/ML
INJECTION INTRAMUSCULAR; INTRAVENOUS
Status: COMPLETED | OUTPATIENT
Start: 2021-04-09 | End: 2021-04-09

## 2021-04-09 RX ORDER — HEPARIN SODIUM 5000 [USP'U]/ML
INJECTION, SOLUTION INTRAVENOUS; SUBCUTANEOUS
Status: COMPLETED | OUTPATIENT
Start: 2021-04-09 | End: 2021-04-09

## 2021-04-09 RX ORDER — SODIUM CHLORIDE 9 MG/ML
INJECTION, SOLUTION INTRAVENOUS CONTINUOUS
Status: DISCONTINUED | OUTPATIENT
Start: 2021-04-09 | End: 2021-04-10 | Stop reason: HOSPADM

## 2021-04-09 RX ORDER — IODIXANOL 320 MG/ML
80 INJECTION, SOLUTION INTRAVASCULAR
Status: COMPLETED | OUTPATIENT
Start: 2021-04-09 | End: 2021-04-09

## 2021-04-09 RX ADMIN — SODIUM CHLORIDE: 9 INJECTION, SOLUTION INTRAVENOUS at 08:49

## 2021-04-09 RX ADMIN — FENTANYL CITRATE 25 MCG: 50 INJECTION INTRAMUSCULAR; INTRAVENOUS at 10:24

## 2021-04-09 RX ADMIN — CEFAZOLIN 1000 MG: 1 INJECTION, POWDER, FOR SOLUTION INTRAMUSCULAR; INTRAVENOUS at 08:50

## 2021-04-09 RX ADMIN — IODIXANOL 80 ML: 320 INJECTION, SOLUTION INTRAVASCULAR at 11:07

## 2021-04-09 RX ADMIN — MIDAZOLAM 1 MG: 1 INJECTION INTRAMUSCULAR; INTRAVENOUS at 10:24

## 2021-04-09 RX ADMIN — HEPARIN SODIUM 5000 UNITS: 5000 INJECTION, SOLUTION INTRAVENOUS; SUBCUTANEOUS at 10:33

## 2021-04-09 ASSESSMENT — PAIN SCALES - GENERAL
PAINLEVEL_OUTOF10: 0
PAINLEVEL_OUTOF10: 4
PAINLEVEL_OUTOF10: 0

## 2021-04-09 ASSESSMENT — PAIN DESCRIPTION - FREQUENCY: FREQUENCY: CONTINUOUS

## 2021-04-09 ASSESSMENT — PAIN DESCRIPTION - PAIN TYPE
TYPE: CHRONIC PAIN
TYPE: CHRONIC PAIN

## 2021-04-09 ASSESSMENT — PAIN - FUNCTIONAL ASSESSMENT: PAIN_FUNCTIONAL_ASSESSMENT: 0-10

## 2021-04-09 ASSESSMENT — PAIN DESCRIPTION - ONSET: ONSET: ON-GOING

## 2021-04-09 ASSESSMENT — PAIN DESCRIPTION - PROGRESSION: CLINICAL_PROGRESSION: NOT CHANGED

## 2021-04-09 NOTE — PROGRESS NOTES
Discharge instructions given, instructed patient to call 911 if any bleeding or hematoma. Patient verbalizes understanding. Will educate her son Tanika Lay as well when he arrives to get her.

## 2021-04-10 NOTE — OP NOTE
[address]                                OPERATIVE REPORT    PATIENT NAME: Keisha Fagan                       :        1935  MED REC NO:   [id number]                            ROOM:  ACCOUNT NO:   [de-identified]                           ADMIT DATE: 2021  PROVIDER:     Shahnaz Garrison MD    DATE OF PROCEDURE:  2021    PREOPERATIVE DIAGNOSIS:  Left SFA severe stenosis. POSTOPERATIVE DIAGNOSIS:  Left SFA and proximal popliteal artery severe  stenosis, left peroneal artery severe stenosis, left anterior tibial and  posterior tibial artery occlusion. OPERATIVE PROCEDURE:  Right common femoral artery to left peroneal  artery catheterization, left femoral angiogram, nitroglycerin infusion  left common femoral artery 1000 mcg x2 boluses, atherectomy of the left  peroneal artery with 4 x 40 UltraScore balloon, PTA of the left SFA and  popliteal artery with 5 x 300 Saber balloon at 8 atmospheres pressure  followed by 5 x 300 Lutonix balloon at 8 atmospheres pressure for 2  minutes, completion left femoral angiography, VASCADE closure right  common femoral artery access site. SURGEON:  Shahnaz Garrison MD    ANESTHESIA:  1% lidocaine IV sedation. EBL:  Less than 5 mL. COMPLICATIONS:  Nil. INDICATIONS:  The patient is an 82-year-old  female with a  history of PAD, has had a prior revascularization. The patient was  being monitored in the office and was found to have severe recurrent  left SFA stenosis. This procedure was indicated. Risks and benefits  were explained. The patient and family understood and agreed to  proceed. OPERATIVE NOTE:  The patient was brought to the angio suite at [facility].  After the patient was placed under conscious sedation  protocol, the patient was prepped and draped in the usual sterile  fashion.   Right common femoral artery was accessed in a retrograde  fashion using micropuncture kit. A 4-Vincentian sheath was placed and  subsized over a 0.035 wire to a 5-Vincentian sheath. We crossed up and over  using a RIM catheter and then performed the left femoral angiogram.  The  findings are as mentioned before. The patient was heparinized and a  0.014 wire was passed through the sheath into the left popliteal and  peroneal arteries. The patient received nitroglycerin infusion in the  left common femoral artery 1000 mcg x2 boluses throughout the procedure. Atherectomy of the left peroneal artery was performed with a 4 x 40  UltraScore balloon at 6 atmospheres pressure. Then, that was removed. PTA of the left SFA and proximal popliteal artery was performed with a 5  x 300 Saber balloon at 8 atmospheres pressure followed by 5 x 300  Lutonix balloon at 8 atmospheres pressure for 2 minutes. Completion  left femoral angiogram showed brisk flow down the SFA into the popliteal  artery and one-vessel runoff which was the peroneal that feeds into the  plantar artery just above the ankle. We pulled the sheath back to right  groin, closed the access site with VASCADE. The patient tolerated the  procedure well, was hemodynamically stable, and left the angio suite in  stable condition. Pedal signals in the left foot have improved and are  present. Right pedal signals are present post procedure.         Carroll Banks MD    D: 04/09/2021 11:15:43       T: 04/09/2021 11:25:35     [email or web address]  Job#: [id number]     Doc#: [id number]    CC:

## 2021-12-25 ENCOUNTER — APPOINTMENT (OUTPATIENT)
Dept: GENERAL RADIOLOGY | Age: 86
End: 2021-12-25
Payer: MEDICARE

## 2021-12-25 ENCOUNTER — HOSPITAL ENCOUNTER (EMERGENCY)
Age: 86
Discharge: HOME | End: 2021-12-25
Payer: MEDICARE

## 2021-12-25 VITALS — TEMPERATURE: 97.4 F | OXYGEN SATURATION: 96 % | RESPIRATION RATE: 18 BRPM | HEART RATE: 75 BPM

## 2021-12-25 DIAGNOSIS — L03.116 CELLULITIS OF LEFT ANKLE: Primary | ICD-10-CM

## 2021-12-25 PROCEDURE — 73610 X-RAY EXAM OF ANKLE: CPT

## 2021-12-25 PROCEDURE — 99282 EMERGENCY DEPT VISIT SF MDM: CPT

## 2021-12-25 RX ORDER — DOXYCYCLINE HYCLATE 100 MG
100 TABLET ORAL 2 TIMES DAILY
Qty: 20 TABLET | Refills: 0 | Status: SHIPPED | OUTPATIENT
Start: 2021-12-25 | End: 2022-01-04

## 2021-12-25 RX ORDER — METHYLPREDNISOLONE 4 MG/1
TABLET ORAL
Qty: 21 TABLET | Status: SHIPPED | OUTPATIENT
Start: 2021-12-25 | End: 2021-12-31

## 2021-12-25 ASSESSMENT — PAIN DESCRIPTION - ORIENTATION: ORIENTATION: LEFT

## 2021-12-25 ASSESSMENT — PAIN DESCRIPTION - LOCATION: LOCATION: ANKLE;FOOT

## 2021-12-25 ASSESSMENT — PAIN DESCRIPTION - PAIN TYPE: TYPE: ACUTE PAIN

## 2021-12-25 NOTE — ED PROVIDER NOTES
[facility]     Department of Emergency Medicine   ED  Encounter Note  Admit Date/RoomTime: 2021 12:06 PM  ED Room: [place] [address]    NAME: Srikanth Obrien  : 1935  MRN: [id number]     Chief Complaint:  Foot Pain (left foot, tmes four days. Denies injury, pain now radiated to ankle)    History of Present Illness         Srikanth Obrien is a 82 y.o. old female presenting to the emergency department by private vehicle with her son, for left ankle pain which occured 4 day(s) prior to arrival.  The complaint is due to walking in her driveway and shed noticed a pain to the posterior aspect of her left ankle. Patient states that the pain in her posterior ankle gradually went away and then migrated to her medial left ankle and she states that she currently has pain to her medial and anterior left ankle. Overall she states that the severity of her pain has remained relatively constant and currently rates it as moderate. With ability to bear weight, but with some pain. Patient has a prior history of pain/injury with regards to today's visit due to a \"spur. \" Her pain is aggraveated by movement, palpation, and weight bearing and relieved by nothing. She denies any head injury, headache, loss of consciousness, confusion, dizziness, neck pain, chest pain, abdominal pain, back pain, numbness, weakness, blurred vision, nausea, vomiting, fever, chills, wounds or rash. ROS   Pertinent positives and negatives are stated within HPI, all other systems reviewed and are negative. Past Medical History:  has a past medical history of Arthritis, History of PSVT (paroxysmal supraventricular tachycardia), [place] (hard of hearing), Hyperlipidemia, Hypertension, Normal nuclear stress test, Thyroid disease, and Vertigo. Surgical History:  has a past surgical history that includes Carotid endarterectomy (?); Hysterectomy; Pilonidal cyst excision ('s);  Tonsillectomy; Breast surgery (); eye surgery (); Cardiac catheterization (1996?); vascular surgery; Upper gastrointestinal endoscopy (06/29/2016); Colonoscopy (2003); and Colonoscopy (07/07/2016). Social History:  reports that she has been smoking cigarettes. She has a 54.00 pack-year smoking history. She has never used smokeless tobacco. She reports that she does not drink alcohol and does not use drugs. Family History: family history is not on file. Allergies: Benazepril, Lipitor, Losartan, Pantoprazole, Pravachol [pravastatin sodium], and Zocor [simvastatin - high dose]    Physical Exam   Oxygen Saturation Interpretation: Normal.        ED Triage Vitals [12/25/21 1143]   BP Temp Temp Source Pulse Resp SpO2 Height Weight   -- 97.4 °F (36.3 °C) Infrared 75 18 96 % -- --         Constitutional:  Alert, development consistent with age. Neck:  Normal ROM. Supple. Left Ankle: Medial and anterior            Tenderness:  moderate. Swelling: Mild. Deformity: no deformity observed/palpated. ROM: full range with pain. Skin: Mildly increased warmth and erythema when compared to the right ankle. This appears more inflammatory than cellulitic. No open wounds, lesions, or drainage. Neurovascular: Motor deficit: none. Sensory deficit:   none. Pulse deficit: none. Capillary refill: normal.  Left Knee:              Tenderness:  none. Swelling: None. Deformity: no deformity observed/palpated. ROM: full range of motion. Skin:  no wounds, erythema, or swelling. Left Foot: diffusely across entire foot              Tenderness:  none. Swelling: None. Deformity: no deformity observed/palpated. ROM: full range of motion. Skin:  no wounds, erythema, or swelling  Gait:  Slight limp, steady gait without assistance from the waiting room to her exam chair.    Lymphatics: No lymphangitis or adenopathy noted. Neurological:  Oriented. Motor functions intact. Lab / Imaging Results   (All laboratory and radiology results have been personally reviewed by myself)  Labs:  No results found for this visit on 12/25/21. Imaging: All Radiology results interpreted by Radiologist unless otherwise noted. XR ANKLE LEFT (MIN 3 VIEWS)   Final Result   No acute fractures. Diffuse soft tissue swelling in the distal left leg, ankle and foot. Cellulitis is considered. ED Course / Medical Decision Making   Medications - No data to display     Consults:   None    Procedure(s):  None    MDM:      Patient presents to the emergency department for left foot pain. Physical examination supports inflammatory arthritis, however infection cannot be completely excluded. Patient will be given the prescriptions below for pain/inflammation as well as treatment of possible cellulitis. Patient with minimal limp and stable gait without assistance. Nothing to suggest DVT on physical examination. Patient to go to [place] [facility] and she understands that this pharmacy will close by 5 PM today. Return for new/worsening symptoms. Plan of Care/Counseling:  Berta Villela PA-C reviewed today's visit with the patient and her son in addition to providing specific details for the plan of care and counseling regarding the diagnosis and prognosis. Questions are answered at this time and are agreeable with the plan. Assessment      1. Cellulitis of left ankle       Plan   Discharged home.   Patient condition is good    New Medications     Discharge Medication List as of 12/25/2021 12:53 PM      START taking these medications    Details   doxycycline hyclate (VIBRA-TABS) 100 MG tablet Take 1 tablet by mouth 2 times daily for 10 days, Disp-20 tablet, R-0Print      diclofenac sodium (VOLTAREN) 1 % GEL Apply 2 g topically 4 times daily, Topical, 4 TIMES DAILY Starting Sat 12/25/2021, Disp-100 g, R-0,

## 2023-01-09 ENCOUNTER — HOSPITAL ENCOUNTER (OUTPATIENT)
Age: 88
Discharge: HOME | End: 2023-01-11
Payer: MEDICARE

## 2023-01-09 ENCOUNTER — HOSPITAL ENCOUNTER (OUTPATIENT)
Dept: GENERAL RADIOLOGY | Age: 88
Discharge: HOME | End: 2023-01-11
Payer: MEDICARE

## 2023-01-09 DIAGNOSIS — R05.3 CHRONIC COUGH: ICD-10-CM

## 2023-01-09 PROCEDURE — 71046 X-RAY EXAM CHEST 2 VIEWS: CPT

## 2023-04-26 ENCOUNTER — APPOINTMENT (OUTPATIENT)
Dept: GENERAL RADIOLOGY | Age: 88
End: 2023-04-26
Payer: MEDICARE

## 2023-04-26 ENCOUNTER — HOSPITAL ENCOUNTER (EMERGENCY)
Age: 88
Discharge: HOME | End: 2023-04-26
Attending: EMERGENCY MEDICINE
Payer: MEDICARE

## 2023-04-26 ENCOUNTER — HOSPITAL ENCOUNTER (EMERGENCY)
Age: 88
Discharge: HOME | End: 2023-04-26
Payer: MEDICARE

## 2023-04-26 ENCOUNTER — APPOINTMENT (OUTPATIENT)
Dept: CT IMAGING | Age: 88
End: 2023-04-26
Payer: MEDICARE

## 2023-04-26 ENCOUNTER — APPOINTMENT (OUTPATIENT)
Dept: ULTRASOUND IMAGING | Age: 88
End: 2023-04-26
Payer: MEDICARE

## 2023-04-26 VITALS
HEART RATE: 70 BPM | HEIGHT: 62 IN | OXYGEN SATURATION: 97 % | SYSTOLIC BLOOD PRESSURE: 160 MMHG | TEMPERATURE: 97.1 F | DIASTOLIC BLOOD PRESSURE: 90 MMHG | BODY MASS INDEX: 30.91 KG/M2 | WEIGHT: 168 LBS | RESPIRATION RATE: 16 BRPM

## 2023-04-26 VITALS
DIASTOLIC BLOOD PRESSURE: 70 MMHG | TEMPERATURE: 97.5 F | OXYGEN SATURATION: 99 % | HEART RATE: 69 BPM | SYSTOLIC BLOOD PRESSURE: 182 MMHG | RESPIRATION RATE: 20 BRPM | BODY MASS INDEX: 30.73 KG/M2 | WEIGHT: 168 LBS

## 2023-04-26 DIAGNOSIS — R19.7 DIARRHEA, UNSPECIFIED TYPE: ICD-10-CM

## 2023-04-26 DIAGNOSIS — R07.9 CHEST PAIN, UNSPECIFIED TYPE: Primary | ICD-10-CM

## 2023-04-26 DIAGNOSIS — E83.42 HYPOMAGNESEMIA: ICD-10-CM

## 2023-04-26 DIAGNOSIS — R10.13 ABDOMINAL PAIN, EPIGASTRIC: ICD-10-CM

## 2023-04-26 DIAGNOSIS — R11.2 NAUSEA AND VOMITING, UNSPECIFIED VOMITING TYPE: Primary | ICD-10-CM

## 2023-04-26 LAB
ALBUMIN SERPL-MCNC: 4.2 G/DL (ref 3.5–5.2)
ALP SERPL-CCNC: 94 U/L (ref 35–104)
ALT SERPL-CCNC: 15 U/L (ref 0–32)
ANION GAP SERPL CALCULATED.3IONS-SCNC: 12 MMOL/L (ref 7–16)
AST SERPL-CCNC: 29 U/L (ref 0–31)
BASOPHILS # BLD: 0.02 E9/L (ref 0–0.2)
BASOPHILS NFR BLD: 0.2 % (ref 0–2)
BILIRUB SERPL-MCNC: 0.7 MG/DL (ref 0–1.2)
BNP BLD-MCNC: 647 PG/ML (ref 0–450)
BUN SERPL-MCNC: 11 MG/DL (ref 6–23)
CALCIUM SERPL-MCNC: 9.5 MG/DL (ref 8.6–10.2)
CHLORIDE SERPL-SCNC: 93 MMOL/L (ref 98–107)
CO2 SERPL-SCNC: 25 MMOL/L (ref 22–29)
CREAT SERPL-MCNC: 0.6 MG/DL (ref 0.5–1)
EKG ATRIAL RATE: 71 BPM
EKG P AXIS: 52 DEGREES
EKG P-R INTERVAL: 146 MS
EKG Q-T INTERVAL: 440 MS
EKG QRS DURATION: 66 MS
EKG QTC CALCULATION (BAZETT): 478 MS
EKG R AXIS: -5 DEGREES
EKG T AXIS: 26 DEGREES
EKG VENTRICULAR RATE: 71 BPM
EOSINOPHIL # BLD: 0.01 E9/L (ref 0.05–0.5)
EOSINOPHIL NFR BLD: 0.1 % (ref 0–6)
ERYTHROCYTE [DISTWIDTH] IN BLOOD BY AUTOMATED COUNT: 11.7 FL (ref 11.5–15)
GLUCOSE SERPL-MCNC: 162 MG/DL (ref 74–99)
HCT VFR BLD AUTO: 42.9 % (ref 34–48)
HGB BLD-MCNC: 14.1 G/DL (ref 11.5–15.5)
IMM GRANULOCYTES # BLD: 0.04 E9/L
IMM GRANULOCYTES NFR BLD: 0.4 % (ref 0–5)
INR BLD: 1.1
LIPASE: 35 U/L (ref 13–60)
LYMPHOCYTES # BLD: 0.82 E9/L (ref 1.5–4)
LYMPHOCYTES NFR BLD: 7.7 % (ref 20–42)
MAGNESIUM SERPL-MCNC: 1.4 MG/DL (ref 1.6–2.6)
MCH RBC QN AUTO: 31 PG (ref 26–35)
MCHC RBC AUTO-ENTMCNC: 32.9 % (ref 32–34.5)
MCV RBC AUTO: 94.3 FL (ref 80–99.9)
MONOCYTES # BLD: 0.31 E9/L (ref 0.1–0.95)
MONOCYTES NFR BLD: 2.9 % (ref 2–12)
NEUTROPHILS # BLD: 9.44 E9/L (ref 1.8–7.3)
NEUTS SEG NFR BLD: 88.7 % (ref 43–80)
PLATELET # BLD AUTO: 252 E9/L (ref 130–450)
PMV BLD AUTO: 10.2 FL (ref 7–12)
POTASSIUM SERPL-SCNC: 3.9 MMOL/L (ref 3.5–5)
PROT SERPL-MCNC: 7.1 G/DL (ref 6.4–8.3)
PROTHROMBIN TIME: 12.6 SEC (ref 9.3–12.4)
RBC # BLD AUTO: 4.55 E12/L (ref 3.5–5.5)
SARS-COV-2 RDRP RESP QL NAA+PROBE: NOT DETECTED
SODIUM SERPL-SCNC: 130 MMOL/L (ref 132–146)
TROPONIN, HIGH SENSITIVITY: 11 NG/L (ref 0–9)
TROPONIN, HIGH SENSITIVITY: 11 NG/L (ref 0–9)
WBC # BLD: 10.6 E9/L (ref 4.5–11.5)

## 2023-04-26 PROCEDURE — 71045 X-RAY EXAM CHEST 1 VIEW: CPT

## 2023-04-26 PROCEDURE — 6360000002 HC RX W HCPCS: Performed by: STUDENT IN AN ORGANIZED HEALTH CARE EDUCATION/TRAINING PROGRAM

## 2023-04-26 PROCEDURE — 80053 COMPREHEN METABOLIC PANEL: CPT

## 2023-04-26 PROCEDURE — 85610 PROTHROMBIN TIME: CPT

## 2023-04-26 PROCEDURE — 2580000003 HC RX 258: Performed by: STUDENT IN AN ORGANIZED HEALTH CARE EDUCATION/TRAINING PROGRAM

## 2023-04-26 PROCEDURE — 87635 SARS-COV-2 COVID-19 AMP PRB: CPT

## 2023-04-26 PROCEDURE — 76705 ECHO EXAM OF ABDOMEN: CPT

## 2023-04-26 PROCEDURE — 93010 ELECTROCARDIOGRAM REPORT: CPT | Performed by: INTERNAL MEDICINE

## 2023-04-26 PROCEDURE — 6360000004 HC RX CONTRAST MEDICATION: Performed by: RADIOLOGY

## 2023-04-26 PROCEDURE — 2500000003 HC RX 250 WO HCPCS: Performed by: STUDENT IN AN ORGANIZED HEALTH CARE EDUCATION/TRAINING PROGRAM

## 2023-04-26 PROCEDURE — 83880 ASSAY OF NATRIURETIC PEPTIDE: CPT

## 2023-04-26 PROCEDURE — 85025 COMPLETE CBC W/AUTO DIFF WBC: CPT

## 2023-04-26 PROCEDURE — 74177 CT ABD & PELVIS W/CONTRAST: CPT

## 2023-04-26 PROCEDURE — 93005 ELECTROCARDIOGRAM TRACING: CPT | Performed by: STUDENT IN AN ORGANIZED HEALTH CARE EDUCATION/TRAINING PROGRAM

## 2023-04-26 PROCEDURE — A4216 STERILE WATER/SALINE, 10 ML: HCPCS | Performed by: STUDENT IN AN ORGANIZED HEALTH CARE EDUCATION/TRAINING PROGRAM

## 2023-04-26 PROCEDURE — 84484 ASSAY OF TROPONIN QUANT: CPT

## 2023-04-26 PROCEDURE — 83690 ASSAY OF LIPASE: CPT

## 2023-04-26 PROCEDURE — 83735 ASSAY OF MAGNESIUM: CPT

## 2023-04-26 PROCEDURE — 99211 OFF/OP EST MAY X REQ PHY/QHP: CPT

## 2023-04-26 RX ORDER — FAMOTIDINE 20 MG/1
20 TABLET, FILM COATED ORAL 2 TIMES DAILY
Qty: 60 TABLET | Refills: 0 | Status: SHIPPED | OUTPATIENT
Start: 2023-04-26

## 2023-04-26 RX ORDER — HYDRALAZINE HYDROCHLORIDE 20 MG/ML
10 INJECTION INTRAMUSCULAR; INTRAVENOUS ONCE
Status: DISCONTINUED | OUTPATIENT
Start: 2023-04-26 | End: 2023-04-26

## 2023-04-26 RX ORDER — ONDANSETRON 2 MG/ML
4 INJECTION INTRAMUSCULAR; INTRAVENOUS ONCE
Status: COMPLETED | OUTPATIENT
Start: 2023-04-26 | End: 2023-04-26

## 2023-04-26 RX ORDER — MAGNESIUM SULFATE IN WATER 40 MG/ML
2000 INJECTION, SOLUTION INTRAVENOUS ONCE
Status: COMPLETED | OUTPATIENT
Start: 2023-04-26 | End: 2023-04-26

## 2023-04-26 RX ORDER — DICYCLOMINE HYDROCHLORIDE 10 MG/1
10 CAPSULE ORAL 4 TIMES DAILY PRN
Qty: 30 CAPSULE | Refills: 0 | Status: SHIPPED | OUTPATIENT
Start: 2023-04-26

## 2023-04-26 RX ORDER — ONDANSETRON 4 MG/1
4 TABLET, ORALLY DISINTEGRATING ORAL EVERY 8 HOURS PRN
Qty: 21 TABLET | Refills: 0 | Status: SHIPPED | OUTPATIENT
Start: 2023-04-26

## 2023-04-26 RX ADMIN — FAMOTIDINE 20 MG: 10 INJECTION, SOLUTION INTRAVENOUS at 11:10

## 2023-04-26 RX ADMIN — ONDANSETRON 4 MG: 2 INJECTION INTRAMUSCULAR; INTRAVENOUS at 11:09

## 2023-04-26 RX ADMIN — MAGNESIUM SULFATE HEPTAHYDRATE 2000 MG: 40 INJECTION, SOLUTION INTRAVENOUS at 12:39

## 2023-04-26 RX ADMIN — IOPAMIDOL 75 ML: 755 INJECTION, SOLUTION INTRAVENOUS at 13:39

## 2023-04-26 ASSESSMENT — ENCOUNTER SYMPTOMS
BLOOD IN STOOL: 0
SORE THROAT: 0
RHINORRHEA: 0
BACK PAIN: 0
SHORTNESS OF BREATH: 0
COUGH: 0
ABDOMINAL PAIN: 1
DIARRHEA: 1
VOMITING: 1
NAUSEA: 1

## 2023-04-26 ASSESSMENT — PAIN SCALES - GENERAL
PAINLEVEL_OUTOF10: 10
PAINLEVEL_OUTOF10: 10

## 2023-04-26 ASSESSMENT — PAIN - FUNCTIONAL ASSESSMENT
PAIN_FUNCTIONAL_ASSESSMENT: 0-10
PAIN_FUNCTIONAL_ASSESSMENT: NONE - DENIES PAIN

## 2023-04-26 NOTE — ED PROVIDER NOTES
auscultation bilaterally, no wheezes, rales, or rhonchi. Not in respiratory distress  Cardiovascular:  Regular rate. Regular rhythm. GI:  Abdomen Soft, Non tender, Non distended. +BS. No rebound, guarding, or rigidity. No pulsatile masses. Musculoskeletal: Moves all extremities x 4. Integument: skin warm and dry. No rashes. Neurologic: GCS 15, no focal deficits  Psychiatric: Normal Affect            DIAGNOSTIC RESULTS   LABS:    Labs Reviewed - No data to display    As interpreted by me, the above displayed labs are abnormal. All other labs obtained during this visit were within normal range or not returned as of this dictation. RADIOLOGY:   Non-plain film images such as CT, Ultrasound and MRI are read by the radiologist. Plain radiographic images are visualized and preliminarily interpreted by the ED Provider with the below findings:        Interpretation per the Radiologist below, if available at the time of this note:    No orders to display     No results found. No results found. PROCEDURES   Unless otherwise noted below, none     Procedures      PAST MEDICAL HISTORY/Chronic Conditions Affecting Care      has a past medical history of Arthritis, History of PSVT (paroxysmal supraventricular tachycardia) (2009), [place] (hard of hearing), Hyperlipidemia, Hypertension, Normal nuclear stress test (1998 ?), Thyroid disease, and Vertigo.      EMERGENCY DEPARTMENT COURSE    Vitals:    Vitals:    04/26/23 0940 04/26/23 0941   BP:  (!) 182/70   Pulse:  69   Resp:  20   Temp:  97.5 °F (36.4 °C)   SpO2:  99%   Weight: 168 lb (76.2 kg)        Patient was given the following medications:  Medications - No data to display                Medical Decision Making/Differential Diagnosis:    CC/HPI Summary, Social Determinants of health, Records Reviewed, DDx, testing done/not done, ED Course, Reassessment, disposition considerations/shared decision making with patient, consults, disposition:        She is

## 2023-04-26 NOTE — ED PROVIDER NOTES
[address]  ED Provider Note  Department of Emergency Medicine     ED Room: 15/15      Written by: Martha Tellez DO  Patient Name: Robin Zamora  Attending Provider: Jey Marx, *  Admit Date: 2023 10:29 AM  MRN: [id number]    : 1935        Chief Complaint   Patient presents with    Abdominal Pain     Complains of epigastric burning since last night. Burning travels into her chest with some N/V       HPI   Robin Zamora is a 82 y.o. female presenting to the ED for evaluation of Abdominal Pain (Complains of epigastric burning since last night. Burning travels into her chest with some N/V)      History obtained from the patient. Limitations to history : None      Patient is an 62-year-old female with past medical history of HTN, HLD, COPD, arthritis, CAD s/p x4 stents, presented to the ED for evaluation of burning epigastric pain with nausea and vomiting since last night. Denies any black or bloody stools, states that she did have some episode of loose stools. She has not had any fever, chest pain or palpitations, shortness of breath, or abnormal urinary symptoms. States that she is feeling like she has some reflux symptoms in her chest/throat. She denies any cough, sore throat, numbness or weakness anywhere. Denies any known sick contacts. Nursing Notes were all reviewed and agreed with or any disagreements were addressed in the HPI. Review of Systems   Constitutional:  Negative for chills and fever. HENT:  Negative for rhinorrhea and sore throat. Respiratory:  Negative for cough and shortness of breath. Cardiovascular:  Negative for chest pain and palpitations. Gastrointestinal:  Positive for abdominal pain, diarrhea, nausea and vomiting. Negative for blood in stool. Genitourinary:  Negative for dysuria and frequency. Musculoskeletal:  Negative for back pain and myalgias. Skin:  Negative for rash and wound.    Neurological:  Negative

## 2023-04-26 NOTE — ED NOTES
Report to oncoming RN, no further at this time.  Awaiting labs for CT     Oneil Alba RN  04/26/23 1278

## 2023-05-17 ENCOUNTER — TELEPHONE (OUTPATIENT)
Dept: SURGERY | Age: 88
End: 2023-05-17

## 2023-05-17 ENCOUNTER — OFFICE VISIT (OUTPATIENT)
Dept: SURGERY | Age: 88
End: 2023-05-17
Payer: MEDICARE

## 2023-05-17 VITALS
WEIGHT: 168 LBS | DIASTOLIC BLOOD PRESSURE: 64 MMHG | TEMPERATURE: 97.5 F | HEIGHT: 62 IN | SYSTOLIC BLOOD PRESSURE: 187 MMHG | BODY MASS INDEX: 30.91 KG/M2 | HEART RATE: 58 BPM

## 2023-05-17 DIAGNOSIS — K80.20 SYMPTOMATIC CHOLELITHIASIS: Primary | ICD-10-CM

## 2023-05-17 PROCEDURE — 1123F ACP DISCUSS/DSCN MKR DOCD: CPT | Performed by: SURGERY

## 2023-05-17 PROCEDURE — 99204 OFFICE O/P NEW MOD 45 MIN: CPT | Performed by: SURGERY

## 2023-05-17 RX ORDER — SODIUM CHLORIDE 0.9 % (FLUSH) 0.9 %
5-40 SYRINGE (ML) INJECTION PRN
OUTPATIENT
Start: 2023-05-17

## 2023-05-17 RX ORDER — SODIUM CHLORIDE 0.9 % (FLUSH) 0.9 %
5-40 SYRINGE (ML) INJECTION EVERY 12 HOURS SCHEDULED
OUTPATIENT
Start: 2023-05-17

## 2023-05-17 RX ORDER — SODIUM CHLORIDE 9 MG/ML
INJECTION, SOLUTION INTRAVENOUS CONTINUOUS
OUTPATIENT
Start: 2023-05-17

## 2023-05-17 RX ORDER — SODIUM CHLORIDE 9 MG/ML
INJECTION, SOLUTION INTRAVENOUS PRN
OUTPATIENT
Start: 2023-05-17

## 2023-05-17 NOTE — TELEPHONE ENCOUNTER
Per the order of Dr. Rupinder Larson, patient has been scheduled for Laparoscopic cholecystectomy with cholangiogram on 6.8.2023. Patient provided with procedure information during office visit and scheduled for post op follow up appointment. Patient instructed to please contact our office with any questions. Patient will need to have medical clearance from Dr. Tyson Downing. Requested faxed, patient instructed to also call PCP to discuss clearance. Procedure scheduled through [facility]. Dr. Rupinder Larson to enter orders.   Electronically signed by Abimbola Gurrola on 5/17/23 at 9:50 AM EDT

## 2023-05-17 NOTE — PROGRESS NOTES
Daily Note     Today's date: 8/15/2019  Patient name: Tushar Cerda  : 1950  MRN: [id number]  Referring provider: Daryn Rivera General Surgery History and Physical  Brain MD Samanta    Patient's Name/Date of Birth: Nhan Mcginnis / 1935    Date: May 17, 2023     Surgeon: Lavera Baumgarten, M.D., M.S.    PCP: Itzel Hollingsworth MD     Chief Complaint: Right upper quadrant pain    HPI:   Nhan Mcginnis is a 82 y.o. female who presents for evaluation of right upper quadrant pain, symptomatic gallstones. Timing is intermittent, radiation to back, alleviated by npo and started several weeks ago. Denies SOB, chest pain, fever, chills, diarrhea, constipation. They have been seen by medical and emergency care professionals for the same complaints and referred for surgical evaluation. Recent ER eval with CT and US with gallstones, large, and large PEH. Her symptoms likely partially reflux but suspect the gallstone is going to be worsening in sx. Past Medical History:   Diagnosis Date    Arthritis     arms,shoulders,hips    History of PSVT (paroxysmal supraventricular tachycardia) 2009    no episodes since    [facility] (hard of hearing)     Hyperlipidemia     Hypertension     Normal nuclear stress test 1998 ? fontanerosa    Thyroid disease     as teenager    Vertigo        Past Surgical History:   Procedure Laterality Date    BREAST SURGERY  1960's    left breast bx  neg    [address]?    neg    CAROTID ENDARTERECTOMY  2004?     left   Dr Emelia Olmos  2003    neg    COLONOSCOPY  07/07/2016    polyps, transverse, sigmoid    EYE SURGERY  2010    esme iol implants    HYSTERECTOMY (CERVIX STATUS UNKNOWN)      PILONIDAL CYST EXCISION  1960's    TONSILLECTOMY      UPPER GASTROINTESTINAL ENDOSCOPY  06/29/2016    Dr. Rishi Minaya    VASCULAR SURGERY         Current Outpatient Medications   Medication Sig Dispense Refill    famotidine (PEPCID) 20 MG tablet Take 1 tablet by mouth 2 times daily 60 tablet 0    dicyclomine (BENTYL) 10 MG capsule Take 1 capsule by mouth 4 times daily as needed (abdominal

## 2023-05-17 NOTE — TELEPHONE ENCOUNTER
Prior Authorization Form:      DEMOGRAPHICS:                     Patient Name:  Sung Sunshine  Patient :  1935            Insurance:  Payor: [facility] MEDICARE / Plan: Gui  / Product Type: *No Product type* /   Insurance ID Number:    Payer/Plan Subscr  Sex Relation Sub. Ins. ID Effective Group Num   1.  [facility] MEDICARE * SAULO RUSS 1935 Female Self [id number]                                    [address] [id number]         DIAGNOSIS & PROCEDURE:                       Procedure/Operation: Laparoscopic cholecystectomy with cholangiogram           CPT Code: 90618    Diagnosis:  Symptomatic cholelithiasis     ICD10 Code: 64898    Location:  [address] [place] [address]    Surgeon:  Guillermina Chatterjee INFORMATION:                          Date: 2023    Time: TBD              Anesthesia:  General                                                       Status:  Outpatient        Special Comments:         Electronically signed by Chadwick Torres on 2023 at 9:50 AM

## 2023-06-01 ENCOUNTER — HOSPITAL ENCOUNTER (OUTPATIENT)
Dept: PREADMISSION TESTING | Age: 88
Discharge: HOME | End: 2023-06-01
Payer: MEDICARE

## 2023-06-01 VITALS
HEART RATE: 61 BPM | WEIGHT: 165.4 LBS | OXYGEN SATURATION: 97 % | DIASTOLIC BLOOD PRESSURE: 90 MMHG | HEIGHT: 62 IN | SYSTOLIC BLOOD PRESSURE: 169 MMHG | BODY MASS INDEX: 30.44 KG/M2 | RESPIRATION RATE: 18 BRPM | TEMPERATURE: 97.8 F

## 2023-06-01 DIAGNOSIS — Z01.818 PREOP TESTING: Primary | ICD-10-CM

## 2023-06-01 LAB
ANION GAP SERPL CALCULATED.3IONS-SCNC: 9 MMOL/L (ref 7–16)
BUN SERPL-MCNC: 12 MG/DL (ref 6–23)
CALCIUM SERPL-MCNC: 9.9 MG/DL (ref 8.6–10.2)
CHLORIDE SERPL-SCNC: 102 MMOL/L (ref 98–107)
CO2 SERPL-SCNC: 28 MMOL/L (ref 22–29)
CREAT SERPL-MCNC: 0.7 MG/DL (ref 0.5–1)
ERYTHROCYTE [DISTWIDTH] IN BLOOD BY AUTOMATED COUNT: 12.5 FL (ref 11.5–15)
GLUCOSE SERPL-MCNC: 104 MG/DL (ref 74–99)
HCT VFR BLD AUTO: 40.2 % (ref 34–48)
HGB BLD-MCNC: 13.2 G/DL (ref 11.5–15.5)
MCH RBC QN AUTO: 30.3 PG (ref 26–35)
MCHC RBC AUTO-ENTMCNC: 32.8 % (ref 32–34.5)
MCV RBC AUTO: 92.4 FL (ref 80–99.9)
PLATELET # BLD AUTO: 218 E9/L (ref 130–450)
PMV BLD AUTO: 10.3 FL (ref 7–12)
POTASSIUM SERPL-SCNC: 4.3 MMOL/L (ref 3.5–5)
RBC # BLD AUTO: 4.35 E12/L (ref 3.5–5.5)
SODIUM SERPL-SCNC: 139 MMOL/L (ref 132–146)
WBC # BLD: 5.9 E9/L (ref 4.5–11.5)

## 2023-06-01 PROCEDURE — 36415 COLL VENOUS BLD VENIPUNCTURE: CPT

## 2023-06-01 PROCEDURE — 85027 COMPLETE CBC AUTOMATED: CPT

## 2023-06-01 PROCEDURE — 80048 BASIC METABOLIC PNL TOTAL CA: CPT

## 2023-06-01 RX ORDER — ACETAMINOPHEN 325 MG/1
650 TABLET ORAL EVERY 6 HOURS PRN
COMMUNITY

## 2023-06-01 NOTE — PROGRESS NOTES
[address] [facility]                                                                                                                    PRE OP INSTRUCTIONS FOR  Candace Caal        Date: 6/1/2023    Date of surgery: 6/8/23   Arrival Time: Hospital will call you between 5pm and 7pm the evening before with your final arrival time for surgery    Nothing by mouth (NPO) as instructed. ___Nothing to eat or drink after midnight_________________________________________________________________    Take the following medications with a small sip of water on the morning of Surgery: norvasc     Diabetics may take evening dose of insulin but none after midnight. If you feel symptomatic or low blood sugar morning of surgery drink 1-2 ounces of apple juice only. Aspirin, Ibuprofen, Advil, Naproxen, Vitamin E and other Anti-inflammatory products should be stopped  before surgery  as directed by your physician. Take Tylenol only unless instructed otherwise by your surgeon. Check with your Doctor regarding stopping Plavix, Coumadin, Lovenox, Eliquis, Effient, or other blood thinners. Do not smoke,use illicit drugs and do not drink any alcoholic beverages 24 hours prior to surgery. You may brush your teeth the morning of surgery. DO NOT SWALLOW WATER    You MUST make arrangements for a responsible adult to take you home after your surgery. You will not be allowed to leave alone or drive yourself home. It is strongly suggested someone stay with you the first 24 hrs. Your surgery will be cancelled if you do not have a ride home. PEDIATRIC PATIENTS ONLY:  A parent/legal guardian must accompany a child scheduled for surgery and plan to stay at the hospital until the child is discharged. Please do not bring other children with you.     Please wear simple, loose fitting clothing to the hospital.  Kristie Martin not bring valuables (money, credit cards, checkbooks, etc.) Do not wear any makeup (including no eye makeup)

## 2023-06-01 NOTE — PROGRESS NOTES
Pt instructed to call Dr. Juan Simpson for her medical clearance, pt voiced understanding. Also spoke with Dr. Caden Hugo office re: clearance.

## 2023-06-05 ENCOUNTER — TELEPHONE (OUTPATIENT)
Dept: CARDIOLOGY CLINIC | Age: 88
End: 2023-06-05

## 2023-06-05 NOTE — TELEPHONE ENCOUNTER
Called patient left voicemail with a call back number on cardiac clearance.      Scanned in to chart and faxed over

## 2023-06-08 ENCOUNTER — ANESTHESIA (OUTPATIENT)
Dept: OPERATING ROOM | Age: 88
End: 2023-06-08
Payer: MEDICARE

## 2023-06-08 ENCOUNTER — HOSPITAL ENCOUNTER (OUTPATIENT)
Dept: GENERAL RADIOLOGY | Age: 88
Discharge: HOME | End: 2023-06-10
Payer: MEDICARE

## 2023-06-08 ENCOUNTER — HOSPITAL ENCOUNTER (OUTPATIENT)
Age: 88
Setting detail: OUTPATIENT SURGERY
Discharge: HOME | End: 2023-06-08
Attending: SURGERY | Admitting: SURGERY
Payer: MEDICARE

## 2023-06-08 ENCOUNTER — ANESTHESIA EVENT (OUTPATIENT)
Dept: OPERATING ROOM | Age: 88
End: 2023-06-08
Payer: MEDICARE

## 2023-06-08 ENCOUNTER — NURSE TRIAGE (OUTPATIENT)
Dept: OTHER | Facility: CLINIC | Age: 88
End: 2023-06-08

## 2023-06-08 VITALS
BODY MASS INDEX: 30.36 KG/M2 | RESPIRATION RATE: 16 BRPM | OXYGEN SATURATION: 95 % | SYSTOLIC BLOOD PRESSURE: 110 MMHG | HEART RATE: 62 BPM | DIASTOLIC BLOOD PRESSURE: 60 MMHG | WEIGHT: 165 LBS | HEIGHT: 62 IN | TEMPERATURE: 97.3 F

## 2023-06-08 DIAGNOSIS — K80.20 SYMPTOMATIC CHOLELITHIASIS: ICD-10-CM

## 2023-06-08 DIAGNOSIS — G89.18 POSTOPERATIVE PAIN: Primary | ICD-10-CM

## 2023-06-08 DIAGNOSIS — G89.18 POST-OP PAIN: Primary | ICD-10-CM

## 2023-06-08 DIAGNOSIS — K82.9 GALLBLADDER DISEASE: ICD-10-CM

## 2023-06-08 PROCEDURE — C1894 INTRO/SHEATH, NON-LASER: HCPCS | Performed by: SURGERY

## 2023-06-08 PROCEDURE — 74300 X-RAY BILE DUCTS/PANCREAS: CPT

## 2023-06-08 PROCEDURE — 3600000014 HC SURGERY LEVEL 4 ADDTL 15MIN: Performed by: SURGERY

## 2023-06-08 PROCEDURE — 6360000004 HC RX CONTRAST MEDICATION: Performed by: SURGERY

## 2023-06-08 PROCEDURE — 7100000000 HC PACU RECOVERY - FIRST 15 MIN: Performed by: SURGERY

## 2023-06-08 PROCEDURE — 3600000004 HC SURGERY LEVEL 4 BASE: Performed by: SURGERY

## 2023-06-08 PROCEDURE — 7100000011 HC PHASE II RECOVERY - ADDTL 15 MIN: Performed by: SURGERY

## 2023-06-08 PROCEDURE — 2500000003 HC RX 250 WO HCPCS: Performed by: SURGERY

## 2023-06-08 PROCEDURE — 3700000001 HC ADD 15 MINUTES (ANESTHESIA): Performed by: SURGERY

## 2023-06-08 PROCEDURE — 6370000000 HC RX 637 (ALT 250 FOR IP): Performed by: ANESTHESIOLOGY

## 2023-06-08 PROCEDURE — 6370000000 HC RX 637 (ALT 250 FOR IP)

## 2023-06-08 PROCEDURE — 7100000010 HC PHASE II RECOVERY - FIRST 15 MIN: Performed by: SURGERY

## 2023-06-08 PROCEDURE — 2580000003 HC RX 258: Performed by: SURGERY

## 2023-06-08 PROCEDURE — 6360000002 HC RX W HCPCS

## 2023-06-08 PROCEDURE — 3700000000 HC ANESTHESIA ATTENDED CARE: Performed by: SURGERY

## 2023-06-08 PROCEDURE — 2580000003 HC RX 258: Performed by: NURSE ANESTHETIST, CERTIFIED REGISTERED

## 2023-06-08 PROCEDURE — 7100000001 HC PACU RECOVERY - ADDTL 15 MIN: Performed by: SURGERY

## 2023-06-08 PROCEDURE — 2500000003 HC RX 250 WO HCPCS: Performed by: NURSE ANESTHETIST, CERTIFIED REGISTERED

## 2023-06-08 PROCEDURE — 6360000002 HC RX W HCPCS: Performed by: SURGERY

## 2023-06-08 PROCEDURE — 6360000002 HC RX W HCPCS: Performed by: NURSE ANESTHETIST, CERTIFIED REGISTERED

## 2023-06-08 PROCEDURE — 2709999900 HC NON-CHARGEABLE SUPPLY: Performed by: SURGERY

## 2023-06-08 RX ORDER — FENTANYL CITRATE 50 UG/ML
INJECTION, SOLUTION INTRAMUSCULAR; INTRAVENOUS PRN
Status: DISCONTINUED | OUTPATIENT
Start: 2023-06-08 | End: 2023-06-08 | Stop reason: SDUPTHER

## 2023-06-08 RX ORDER — DOCUSATE SODIUM 100 MG/1
100 CAPSULE, LIQUID FILLED ORAL 2 TIMES DAILY
Qty: 30 CAPSULE | Refills: 0 | Status: SHIPPED | OUTPATIENT
Start: 2023-06-08 | End: 2023-06-23

## 2023-06-08 RX ORDER — MORPHINE SULFATE 2 MG/ML
2 INJECTION, SOLUTION INTRAMUSCULAR; INTRAVENOUS EVERY 5 MIN PRN
Status: DISCONTINUED | OUTPATIENT
Start: 2023-06-08 | End: 2023-06-08 | Stop reason: HOSPADM

## 2023-06-08 RX ORDER — METHOCARBAMOL 100 MG/ML
INJECTION, SOLUTION INTRAMUSCULAR; INTRAVENOUS
Status: COMPLETED
Start: 2023-06-08 | End: 2023-06-08

## 2023-06-08 RX ORDER — HYDROCODONE BITARTRATE AND ACETAMINOPHEN 5; 325 MG/1; MG/1
1 TABLET ORAL ONCE
Status: COMPLETED | OUTPATIENT
Start: 2023-06-08 | End: 2023-06-08

## 2023-06-08 RX ORDER — IPRATROPIUM BROMIDE AND ALBUTEROL SULFATE 2.5; .5 MG/3ML; MG/3ML
SOLUTION RESPIRATORY (INHALATION)
Status: COMPLETED
Start: 2023-06-08 | End: 2023-06-08

## 2023-06-08 RX ORDER — HYDRALAZINE HYDROCHLORIDE 20 MG/ML
10 INJECTION INTRAMUSCULAR; INTRAVENOUS
Status: DISCONTINUED | OUTPATIENT
Start: 2023-06-08 | End: 2023-06-08 | Stop reason: HOSPADM

## 2023-06-08 RX ORDER — SCOLOPAMINE TRANSDERMAL SYSTEM 1 MG/1
1 PATCH, EXTENDED RELEASE TRANSDERMAL
Status: DISCONTINUED | OUTPATIENT
Start: 2023-06-08 | End: 2023-06-08 | Stop reason: HOSPADM

## 2023-06-08 RX ORDER — BUPIVACAINE HYDROCHLORIDE AND EPINEPHRINE 2.5; 5 MG/ML; UG/ML
INJECTION, SOLUTION EPIDURAL; INFILTRATION; INTRACAUDAL; PERINEURAL PRN
Status: DISCONTINUED | OUTPATIENT
Start: 2023-06-08 | End: 2023-06-08 | Stop reason: ALTCHOICE

## 2023-06-08 RX ORDER — LIDOCAINE HYDROCHLORIDE 20 MG/ML
INJECTION, SOLUTION INTRAVENOUS PRN
Status: DISCONTINUED | OUTPATIENT
Start: 2023-06-08 | End: 2023-06-08 | Stop reason: SDUPTHER

## 2023-06-08 RX ORDER — SODIUM CHLORIDE 0.9 % (FLUSH) 0.9 %
5-40 SYRINGE (ML) INJECTION PRN
Status: DISCONTINUED | OUTPATIENT
Start: 2023-06-08 | End: 2023-06-08 | Stop reason: HOSPADM

## 2023-06-08 RX ORDER — HYDRALAZINE HYDROCHLORIDE 20 MG/ML
INJECTION INTRAMUSCULAR; INTRAVENOUS
Status: COMPLETED
Start: 2023-06-08 | End: 2023-06-08

## 2023-06-08 RX ORDER — ONDANSETRON 2 MG/ML
INJECTION INTRAMUSCULAR; INTRAVENOUS PRN
Status: DISCONTINUED | OUTPATIENT
Start: 2023-06-08 | End: 2023-06-08 | Stop reason: SDUPTHER

## 2023-06-08 RX ORDER — HYDROCODONE BITARTRATE AND ACETAMINOPHEN 5; 325 MG/1; MG/1
1 TABLET ORAL EVERY 6 HOURS PRN
Qty: 12 TABLET | Refills: 0 | Status: SHIPPED | OUTPATIENT
Start: 2023-06-08 | End: 2023-06-11

## 2023-06-08 RX ORDER — PROPOFOL 10 MG/ML
INJECTION, EMULSION INTRAVENOUS PRN
Status: DISCONTINUED | OUTPATIENT
Start: 2023-06-08 | End: 2023-06-08 | Stop reason: SDUPTHER

## 2023-06-08 RX ORDER — SODIUM CHLORIDE 9 MG/ML
INJECTION, SOLUTION INTRAVENOUS PRN
Status: DISCONTINUED | OUTPATIENT
Start: 2023-06-08 | End: 2023-06-08 | Stop reason: HOSPADM

## 2023-06-08 RX ORDER — NEOSTIGMINE METHYLSULFATE 1 MG/ML
INJECTION, SOLUTION INTRAVENOUS PRN
Status: DISCONTINUED | OUTPATIENT
Start: 2023-06-08 | End: 2023-06-08 | Stop reason: SDUPTHER

## 2023-06-08 RX ORDER — DEXAMETHASONE SODIUM PHOSPHATE 10 MG/ML
INJECTION, SOLUTION INTRAMUSCULAR; INTRAVENOUS PRN
Status: DISCONTINUED | OUTPATIENT
Start: 2023-06-08 | End: 2023-06-08 | Stop reason: SDUPTHER

## 2023-06-08 RX ORDER — FENTANYL CITRATE 0.05 MG/ML
25 INJECTION, SOLUTION INTRAMUSCULAR; INTRAVENOUS EVERY 5 MIN PRN
Status: DISCONTINUED | OUTPATIENT
Start: 2023-06-08 | End: 2023-06-08 | Stop reason: HOSPADM

## 2023-06-08 RX ORDER — PROCHLORPERAZINE EDISYLATE 5 MG/ML
5 INJECTION INTRAMUSCULAR; INTRAVENOUS
Status: DISCONTINUED | OUTPATIENT
Start: 2023-06-08 | End: 2023-06-08 | Stop reason: HOSPADM

## 2023-06-08 RX ORDER — SODIUM CHLORIDE 0.9 % (FLUSH) 0.9 %
5-40 SYRINGE (ML) INJECTION EVERY 12 HOURS SCHEDULED
Status: DISCONTINUED | OUTPATIENT
Start: 2023-06-08 | End: 2023-06-08 | Stop reason: HOSPADM

## 2023-06-08 RX ORDER — IPRATROPIUM BROMIDE AND ALBUTEROL SULFATE 2.5; .5 MG/3ML; MG/3ML
1 SOLUTION RESPIRATORY (INHALATION)
Status: COMPLETED | OUTPATIENT
Start: 2023-06-08 | End: 2023-06-08

## 2023-06-08 RX ORDER — GLYCOPYRROLATE 0.2 MG/ML
INJECTION INTRAMUSCULAR; INTRAVENOUS PRN
Status: DISCONTINUED | OUTPATIENT
Start: 2023-06-08 | End: 2023-06-08 | Stop reason: SDUPTHER

## 2023-06-08 RX ORDER — SODIUM CHLORIDE 9 MG/ML
INJECTION, SOLUTION INTRAVENOUS CONTINUOUS PRN
Status: DISCONTINUED | OUTPATIENT
Start: 2023-06-08 | End: 2023-06-08 | Stop reason: SDUPTHER

## 2023-06-08 RX ORDER — OXYCODONE HYDROCHLORIDE AND ACETAMINOPHEN 5; 325 MG/1; MG/1
1 TABLET ORAL EVERY 6 HOURS PRN
Qty: 12 TABLET | Refills: 0 | Status: SHIPPED | OUTPATIENT
Start: 2023-06-08 | End: 2023-06-11

## 2023-06-08 RX ORDER — MIDAZOLAM HYDROCHLORIDE 1 MG/ML
2 INJECTION INTRAMUSCULAR; INTRAVENOUS
Status: DISCONTINUED | OUTPATIENT
Start: 2023-06-08 | End: 2023-06-08 | Stop reason: HOSPADM

## 2023-06-08 RX ORDER — ONDANSETRON 4 MG/1
4 TABLET, ORALLY DISINTEGRATING ORAL EVERY 8 HOURS PRN
Qty: 20 TABLET | Refills: 1 | Status: SHIPPED | OUTPATIENT
Start: 2023-06-08

## 2023-06-08 RX ORDER — DIPHENHYDRAMINE HYDROCHLORIDE 50 MG/ML
12.5 INJECTION INTRAMUSCULAR; INTRAVENOUS
Status: DISCONTINUED | OUTPATIENT
Start: 2023-06-08 | End: 2023-06-08 | Stop reason: HOSPADM

## 2023-06-08 RX ORDER — KETOROLAC TROMETHAMINE 15 MG/ML
15 INJECTION, SOLUTION INTRAMUSCULAR; INTRAVENOUS
Status: DISCONTINUED | OUTPATIENT
Start: 2023-06-08 | End: 2023-06-08 | Stop reason: HOSPADM

## 2023-06-08 RX ORDER — ROCURONIUM BROMIDE 10 MG/ML
INJECTION, SOLUTION INTRAVENOUS PRN
Status: DISCONTINUED | OUTPATIENT
Start: 2023-06-08 | End: 2023-06-08 | Stop reason: SDUPTHER

## 2023-06-08 RX ORDER — SODIUM CHLORIDE 9 MG/ML
INJECTION, SOLUTION INTRAVENOUS CONTINUOUS
Status: DISCONTINUED | OUTPATIENT
Start: 2023-06-08 | End: 2023-06-08 | Stop reason: HOSPADM

## 2023-06-08 RX ORDER — SCOLOPAMINE TRANSDERMAL SYSTEM 1 MG/1
PATCH, EXTENDED RELEASE TRANSDERMAL
Status: COMPLETED
Start: 2023-06-08 | End: 2023-06-08

## 2023-06-08 RX ORDER — ONDANSETRON 2 MG/ML
4 INJECTION INTRAMUSCULAR; INTRAVENOUS
Status: DISCONTINUED | OUTPATIENT
Start: 2023-06-08 | End: 2023-06-08 | Stop reason: HOSPADM

## 2023-06-08 RX ORDER — LABETALOL HYDROCHLORIDE 5 MG/ML
10 INJECTION, SOLUTION INTRAVENOUS
Status: DISCONTINUED | OUTPATIENT
Start: 2023-06-08 | End: 2023-06-08 | Stop reason: HOSPADM

## 2023-06-08 RX ADMIN — FENTANYL CITRATE 25 MCG: 50 INJECTION, SOLUTION INTRAMUSCULAR; INTRAVENOUS at 10:15

## 2023-06-08 RX ADMIN — SUGAMMADEX 200 MG: 100 INJECTION, SOLUTION INTRAVENOUS at 10:50

## 2023-06-08 RX ADMIN — SODIUM CHLORIDE: 900 INJECTION, SOLUTION INTRAVENOUS at 10:33

## 2023-06-08 RX ADMIN — SODIUM CHLORIDE: 900 INJECTION, SOLUTION INTRAVENOUS at 09:38

## 2023-06-08 RX ADMIN — CEFAZOLIN 2000 MG: 2 INJECTION, POWDER, FOR SOLUTION INTRAMUSCULAR; INTRAVENOUS at 09:55

## 2023-06-08 RX ADMIN — HYDRALAZINE HYDROCHLORIDE 5 MG: 20 INJECTION INTRAMUSCULAR; INTRAVENOUS at 10:55

## 2023-06-08 RX ADMIN — PROPOFOL 110 MG: 10 INJECTION, EMULSION INTRAVENOUS at 09:49

## 2023-06-08 RX ADMIN — METHOCARBAMOL 1000 MG: 100 INJECTION INTRAMUSCULAR; INTRAVENOUS at 10:55

## 2023-06-08 RX ADMIN — FENTANYL CITRATE 25 MCG: 50 INJECTION, SOLUTION INTRAMUSCULAR; INTRAVENOUS at 10:39

## 2023-06-08 RX ADMIN — ROCURONIUM BROMIDE 30 MG: 100 INJECTION INTRAVENOUS at 09:49

## 2023-06-08 RX ADMIN — Medication 3 MG: at 10:33

## 2023-06-08 RX ADMIN — IPRATROPIUM BROMIDE AND ALBUTEROL SULFATE 1 DOSE: 2.5; .5 SOLUTION RESPIRATORY (INHALATION) at 11:01

## 2023-06-08 RX ADMIN — HYDROCODONE BITARTRATE AND ACETAMINOPHEN 1 TABLET: 5; 325 TABLET ORAL at 12:49

## 2023-06-08 RX ADMIN — SODIUM CHLORIDE: 9 INJECTION, SOLUTION INTRAVENOUS at 09:23

## 2023-06-08 RX ADMIN — LIDOCAINE HYDROCHLORIDE 80 MG: 20 INJECTION, SOLUTION INTRAVENOUS at 09:49

## 2023-06-08 RX ADMIN — ONDANSETRON 4 MG: 2 INJECTION INTRAMUSCULAR; INTRAVENOUS at 10:33

## 2023-06-08 RX ADMIN — FENTANYL CITRATE 50 MCG: 50 INJECTION, SOLUTION INTRAMUSCULAR; INTRAVENOUS at 09:49

## 2023-06-08 RX ADMIN — DEXAMETHASONE SODIUM PHOSPHATE 10 MG: 10 INJECTION, SOLUTION INTRAMUSCULAR; INTRAVENOUS at 09:55

## 2023-06-08 RX ADMIN — GLYCOPYRROLATE 0.6 MG: 0.2 INJECTION INTRAMUSCULAR; INTRAVENOUS at 10:33

## 2023-06-08 ASSESSMENT — PAIN DESCRIPTION - LOCATION: LOCATION: ABDOMEN

## 2023-06-08 ASSESSMENT — PAIN DESCRIPTION - DESCRIPTORS: DESCRIPTORS: ACHING;CRAMPING;DISCOMFORT

## 2023-06-08 ASSESSMENT — LIFESTYLE VARIABLES: SMOKING_STATUS: 1

## 2023-06-08 ASSESSMENT — PAIN SCALES - GENERAL: PAINLEVEL_OUTOF10: 5

## 2023-06-08 ASSESSMENT — PAIN - FUNCTIONAL ASSESSMENT: PAIN_FUNCTIONAL_ASSESSMENT: 0-10

## 2023-06-08 NOTE — DISCHARGE INSTRUCTIONS
POST-OPERATIVE INSTRUCTIONS FOR GALLBLADDER SURGERY    Call the office at [phone] to schedule your post-operative appointment with Dr. Sindhu Lai for two (2) weeks. You will have surgical glue closing your incisions, you may shower 24hours after your surgery but do not rub off glue, it will come off on its own over time. Do not bathe for 3 days after your surgery, shower only and pat incisions dry after shower. General guidelines for activity:   Avoid strenuous activity or lifting anything heavier than 15 pounds for 4 weeks. It is OK to be up, walking around, and walking up and down stairs. Do what is comfortable: stop and rest when you feel tired. Drink plenty of fluids and stay on a bland diet for 2-3 days after surgery. Do NOT drive for one week and while taking your narcotic pain medicine. Watch for signs of infection:  Excessive warmth or bright redness around your incisions  Leakage of bloody or cloudy fluid from you incisions  Fever over 100.5    During the laparoscopic procedure that you had, gas is pumped into the abdominal cavity. You may feel abdominal, shoulder, or rib pain for a few days due to this gas. You will have pain medicine ordered. Take as directed. BOWELS: constipation is a side effect of your pain meds, take a daily laxative (miralax, dulcolax, etc.) as needed to keep your bowels moving as they normally do, do not go 2-3 days without having a bowel movement. Pain medications; Percocet- take at least 1/2 pill every 6 hours the first 36 hours after surgery, and may take as many as 2 pills every 4 hours. After the first 36hours only take the pills as needed and stop them as soon as possible.  Pain meds cause constipation

## 2023-06-08 NOTE — H&P
pain, bloating, nausea, recurrent, hiatal hernia    Plan:  I reviewed the US performed and relevant abdominal imaging independently and entirely  I reviewed ER and medical specialist notes relating to the patients above complaints  We discussed the imaging and relevant labs  We discussed nonoperative treatment as well as medications and surgical options  I discussed mechanism of action of medications and futility of that treatment in my opinion. I discussed gallbladder function and consequences of removal at length including its interaction with the liver and pancreas and bowel and role in digestion. Recommend to proceed OR for laparoscopic possible robotic cholecystectomy with intraoperative cholangiogram  Scheduling will be accomplished today in the office at the patients request  Medical clearance will be obtained      Discussed risks of injury to liver, common bile duct, hepatic duct, surrounding vascular structures, small bowel, stomach. Risk for further surgery to correct complications.   Plan for laparoscopic, possible open cholecystectomy with possible intraoperative cholangiogram. Patient agrees and all questions answered to their and family's satisfaction      Yolanda Santana MD  7:20 AM  6/8/2023

## 2023-06-08 NOTE — ANESTHESIA PRE PROCEDURE
Department of Anesthesiology  Preprocedure Note       Name:  Mortimer Callow   Age:  82 y.o.  :  1935                                          MRN:  [id number]         Date:  2023      Surgeon: Chanell Alvarado):  Yolanda Santana MD    Procedure: Procedure(s):  CHOLECYSTECTOMY LAPAROSCOPIC WITH CHOLANGIOGRAM (CPT 73386)    Medications prior to admission:   Prior to Admission medications    Medication Sig Start Date End Date Taking? Authorizing Provider   famotidine (PEPCID) 20 MG tablet Take by mouth 23   Historical Provider, MD   dicyclomine (BENTYL) 10 MG capsule Take by mouth 23   Historical Provider, MD   acetaminophen (TYLENOL) 325 MG tablet Take 2 tablets by mouth every 6 hours as needed for Pain    Historical Provider, MD   ezetimibe (ZETIA) 10 MG tablet Take 1 tablet by mouth daily am    Historical Provider, MD   amlodipine (NORVASC) 10 MG tablet Take 0.5 tablets by mouth daily    Historical Provider, MD   atenolol-chlorthalidone (TENORETIC) 100-25 MG per tablet Take 1 tablet by mouth daily am    Historical Provider, MD   potassium chloride SA (K-DUR;KLOR-CON) 20 MEQ tablet Take 1 tablet by mouth three times a week Mon,Wed,Fri    Historical Provider, MD   clopidogrel (PLAVIX) 75 MG tablet Take 1 tablet by mouth daily    Historical Provider, MD   Calcium + D (CALTRATE) 600-200 MG-UNIT tablet Take 1 tablet by mouth daily. Historical Provider, MD   Multiple Vitamins-Calcium (ONE-A-DAY WOMENS FORMULA PO) Take 1 tablet by mouth daily.       Historical Provider, MD   vitamin E 400 UNIT capsule Take 1 capsule by mouth daily    Historical Provider, MD       Current medications:    Current Facility-Administered Medications   Medication Dose Route Frequency Provider Last Rate Last Admin    0.9 % sodium chloride infusion   IntraVENous Continuous Yolanda Santana  mL/hr at 23 0923 New Bag at 23    sodium chloride flush 0.9 % injection 5-40 mL  5-40 mL IntraVENous 2 times per day

## 2023-06-08 NOTE — FLOWSHEET NOTE
Dr. Molina notified of patient's BP of 98/56 with a MAP of 75.  Dr. Molina was ok with the BP and said patient was ok to send to [facility]

## 2023-06-08 NOTE — ANESTHESIA POSTPROCEDURE EVALUATION
Department of Anesthesiology  Postprocedure Note    Patient: Julian Rich  MRN: [id number]  YOB: 1935  Date of evaluation: 6/8/2023      Procedure Summary     Date: 06/08/23 Room / Location: [address] [place] [address] 06 / [facility]    Anesthesia Start: 2092 Anesthesia Stop: 4183    Procedure: CHOLECYSTECTOMY LAPAROSCOPIC WITH CHOLANGIOGRAM (CPT 86274) Diagnosis:       Symptomatic cholelithiasis      (Symptomatic cholelithiasis [K80.20])    Surgeons: Annie López MD Responsible Provider: Dayan Vizcarra MD    Anesthesia Type: general ASA Status: 3          Anesthesia Type: No value filed.     Caren Phase I: Caren Score: 9    Caren Phase II: Caren Score: 10      Anesthesia Post Evaluation    Patient location during evaluation: PACU  Patient participation: complete - patient participated  Level of consciousness: awake  Airway patency: patent  Nausea & Vomiting: no nausea and no vomiting  Complications: no  Cardiovascular status: hemodynamically stable  Respiratory status: acceptable  Hydration status: euvolemic

## 2023-06-09 NOTE — TELEPHONE ENCOUNTER
Location of patient: [place] [address] triage speaking with son    Subjective: Caller states Pt had gallbladder taken out pt is having a tough time from the gas. Caller wanted to know if gas ex would help with pain. Current Symptoms: gas pain    Onset: 12 hours ago;     Pain Severity: \"pretty uncomfortable\"    What has been tried: movement    Advised to try warm fluids and warmth on upper abdomin/chest      Recommended disposition:  Call on-call surgeon    Care advice provided, patient verbalizes understanding; denies any other questions or concerns; instructed to call back for any new or worsening symptoms. Patient/caller agrees to follow-up with PCP     This triage is a result of a call to [address] [place] [address]. Please do not respond to the triage nurse through this encounter. Any subsequent communication should be directly with the patient.       Reason for Disposition   [1] Caller has URGENT question AND [2] triager unable to answer question    Protocols used: Post-Op Symptoms and Questions-ADULT-

## 2023-06-21 ENCOUNTER — OFFICE VISIT (OUTPATIENT)
Dept: SURGERY | Age: 88
End: 2023-06-21

## 2023-06-21 VITALS — HEART RATE: 64 BPM | DIASTOLIC BLOOD PRESSURE: 78 MMHG | SYSTOLIC BLOOD PRESSURE: 161 MMHG | TEMPERATURE: 96.8 F

## 2023-06-21 DIAGNOSIS — K80.20 SYMPTOMATIC CHOLELITHIASIS: Primary | ICD-10-CM

## 2023-06-21 PROCEDURE — 99024 POSTOP FOLLOW-UP VISIT: CPT | Performed by: SURGERY

## 2023-06-21 RX ORDER — FAMOTIDINE 20 MG/1
20 TABLET, FILM COATED ORAL 2 TIMES DAILY
Qty: 60 TABLET | Refills: 0 | Status: SHIPPED | OUTPATIENT
Start: 2023-06-21

## 2023-06-21 RX ORDER — SUCRALFATE 1 G/1
1 TABLET ORAL 4 TIMES DAILY
Qty: 120 TABLET | Refills: 0 | Status: SHIPPED | OUTPATIENT
Start: 2023-06-21

## 2023-06-21 NOTE — PROGRESS NOTES
General Surgery Office Note  Hilario Rose MD, MS    Patient's Name/Date of Birth: Xander Clarke / 1935    Date: June 21, 2023     Chief compaint: Postop visit from laparoscopic cholecystectomy    Surgeon: Eduardo Benitez MD    Patient Active Problem List   Diagnosis    Carotid stenosis    Hypoxia    COPD with acute exacerbation ([facility] [address]    Symptomatic cholelithiasis       Subjective: Doing well, no new complaints, pain prior to surgery has resolved, tolerating diet, bowel function normal, anxious to return to normal physical activity    Objective:  BP (!) 190/83   Temp 96.8 °F (36 °C)   Labs:  No results for input(s): WBC, HGB, HCT in the last 72 hours. Invalid input(s): PLR  Lab Results   Component Value Date    CREATININE 0.7 06/01/2023    BUN 12 06/01/2023     06/01/2023    K 4.3 06/01/2023     06/01/2023    CO2 28 06/01/2023     No results for input(s): LIPASE, AMYLASE in the last 72 hours. General appearance: AA, NAD  HEENT: NCAT, PERRLA, EOMI  Lungs: Clear, equal rise bilateral  Heart: Reg  Abdomen: soft, nondistended, nontender, incisions well healed, no signs of infection, no cellulitis, no hematoma      Pathology: Chronic cholecystitis, cholelithaisis    Assessment/Plan:  Xander Clarke is a 82 y.o. female 2 weeks s/p laparoscopic cholecystectomy. Doing well.  Hiatal hernia    Resume activity gradually   No heavy lifting more than 20lbs for 4 weeks total  Pathology reviewed and copy provided  Follow up as needed  H2 blocker and carafate for GERD  Large hiatal hernia, no plans to repair at this time    Physician Signature: Electronically signed by Dr. Eduardo Benitez  [phone] (p)  6/21/2023  10:21 AM

## 2023-08-02 ENCOUNTER — OFFICE VISIT (OUTPATIENT)
Dept: SURGERY | Age: 88
End: 2023-08-02
Payer: MEDICARE

## 2023-08-02 VITALS
SYSTOLIC BLOOD PRESSURE: 133 MMHG | BODY MASS INDEX: 30.36 KG/M2 | WEIGHT: 165 LBS | HEIGHT: 62 IN | TEMPERATURE: 98.2 F | HEART RATE: 72 BPM | DIASTOLIC BLOOD PRESSURE: 75 MMHG

## 2023-08-02 DIAGNOSIS — K44.9 HIATAL HERNIA: ICD-10-CM

## 2023-08-02 DIAGNOSIS — K21.00 GASTROESOPHAGEAL REFLUX DISEASE WITH ESOPHAGITIS WITHOUT HEMORRHAGE: Primary | ICD-10-CM

## 2023-08-02 PROCEDURE — 99212 OFFICE O/P EST SF 10 MIN: CPT | Performed by: SURGERY

## 2023-08-02 PROCEDURE — 1123F ACP DISCUSS/DSCN MKR DOCD: CPT | Performed by: SURGERY

## 2025-04-14 ENCOUNTER — APPOINTMENT (OUTPATIENT)
Dept: CT IMAGING | Age: 89
End: 2025-04-14
Payer: MEDICARE

## 2025-04-14 ENCOUNTER — HOSPITAL ENCOUNTER (EMERGENCY)
Age: 89
Discharge: HOME | End: 2025-04-14
Attending: EMERGENCY MEDICINE
Payer: MEDICARE

## 2025-04-14 ENCOUNTER — APPOINTMENT (OUTPATIENT)
Dept: GENERAL RADIOLOGY | Age: 89
End: 2025-04-14
Payer: MEDICARE

## 2025-04-14 VITALS
DIASTOLIC BLOOD PRESSURE: 55 MMHG | OXYGEN SATURATION: 100 % | SYSTOLIC BLOOD PRESSURE: 178 MMHG | HEART RATE: 59 BPM | RESPIRATION RATE: 20 BRPM | TEMPERATURE: 97.9 F

## 2025-04-14 DIAGNOSIS — R91.8 LUNG MASS: Primary | ICD-10-CM

## 2025-04-14 LAB
ALBUMIN SERPL-MCNC: 3.5 G/DL (ref 3.5–5.2)
ALP SERPL-CCNC: 124 U/L (ref 35–104)
ALT SERPL-CCNC: 19 U/L (ref 0–32)
ANION GAP SERPL CALCULATED.3IONS-SCNC: 11 MMOL/L (ref 7–16)
AST SERPL-CCNC: 31 U/L (ref 0–31)
BASOPHILS # BLD: 0.05 K/UL (ref 0–0.2)
BASOPHILS NFR BLD: 1 % (ref 0–2)
BILIRUB SERPL-MCNC: 0.6 MG/DL (ref 0–1.2)
BUN SERPL-MCNC: 15 MG/DL (ref 6–23)
CALCIUM SERPL-MCNC: 12.6 MG/DL (ref 8.6–10.2)
CHLORIDE SERPL-SCNC: 98 MMOL/L (ref 98–107)
CO2 SERPL-SCNC: 27 MMOL/L (ref 22–29)
CREAT SERPL-MCNC: 0.8 MG/DL (ref 0.5–1)
EOSINOPHIL # BLD: 0.11 K/UL (ref 0.05–0.5)
EOSINOPHILS RELATIVE PERCENT: 1 % (ref 0–6)
ERYTHROCYTE [DISTWIDTH] IN BLOOD BY AUTOMATED COUNT: 13.2 % (ref 11.5–15)
GFR, ESTIMATED: 77 ML/MIN/1.73M2
GLUCOSE SERPL-MCNC: 102 MG/DL (ref 74–99)
HCT VFR BLD AUTO: 36.6 % (ref 34–48)
HGB BLD-MCNC: 11.9 G/DL (ref 11.5–15.5)
IMM GRANULOCYTES # BLD AUTO: 0.03 K/UL (ref 0–0.58)
IMM GRANULOCYTES NFR BLD: 0 % (ref 0–5)
LYMPHOCYTES NFR BLD: 1.58 K/UL (ref 1.5–4)
LYMPHOCYTES RELATIVE PERCENT: 17 % (ref 20–42)
MCH RBC QN AUTO: 28.9 PG (ref 26–35)
MCHC RBC AUTO-ENTMCNC: 32.5 G/DL (ref 32–34.5)
MCV RBC AUTO: 88.8 FL (ref 80–99.9)
MONOCYTES NFR BLD: 0.62 K/UL (ref 0.1–0.95)
MONOCYTES NFR BLD: 7 % (ref 2–12)
NEUTROPHILS NFR BLD: 75 % (ref 43–80)
NEUTS SEG NFR BLD: 7.17 K/UL (ref 1.8–7.3)
PLATELET # BLD AUTO: 327 K/UL (ref 130–450)
PMV BLD AUTO: 10.1 FL (ref 7–12)
POTASSIUM SERPL-SCNC: 4.1 MMOL/L (ref 3.5–5)
PROT SERPL-MCNC: 7.3 G/DL (ref 6.4–8.3)
RBC # BLD AUTO: 4.12 M/UL (ref 3.5–5.5)
SODIUM SERPL-SCNC: 136 MMOL/L (ref 132–146)
WBC OTHER # BLD: 9.6 K/UL (ref 4.5–11.5)

## 2025-04-14 PROCEDURE — 71260 CT THORAX DX C+: CPT

## 2025-04-14 PROCEDURE — 74022 RADEX COMPL AQT ABD SERIES: CPT

## 2025-04-14 PROCEDURE — 85025 COMPLETE CBC W/AUTO DIFF WBC: CPT

## 2025-04-14 PROCEDURE — 99285 EMERGENCY DEPT VISIT HI MDM: CPT

## 2025-04-14 PROCEDURE — 80053 COMPREHEN METABOLIC PANEL: CPT

## 2025-04-14 PROCEDURE — 74177 CT ABD & PELVIS W/CONTRAST: CPT

## 2025-04-14 PROCEDURE — 6360000004 HC RX CONTRAST MEDICATION: Performed by: RADIOLOGY

## 2025-04-14 RX ORDER — IOPAMIDOL 755 MG/ML
75 INJECTION, SOLUTION INTRAVASCULAR
Status: COMPLETED | OUTPATIENT
Start: 2025-04-14 | End: 2025-04-14

## 2025-04-14 RX ADMIN — IOPAMIDOL 75 ML: 755 INJECTION, SOLUTION INTRAVENOUS at 16:34

## 2025-04-14 ASSESSMENT — ENCOUNTER SYMPTOMS
SHORTNESS OF BREATH: 0
COUGH: 1
ABDOMINAL PAIN: 0
NAUSEA: 0
VOMITING: 0
DIARRHEA: 0
CONSTIPATION: 1

## 2025-04-14 ASSESSMENT — LIFESTYLE VARIABLES
HOW MANY STANDARD DRINKS CONTAINING ALCOHOL DO YOU HAVE ON A TYPICAL DAY: PATIENT DOES NOT DRINK
HOW OFTEN DO YOU HAVE A DRINK CONTAINING ALCOHOL: NEVER

## 2025-04-14 NOTE — ED TRIAGE NOTES
FIRST PROVIDER CONTACT ASSESSMENT NOTE       Department of Emergency Medicine                 First Provider Note            25  12:41 PM EDT    Date of Encounter: No admission date for patient encounter.    Patient Name: Terra Mahan  : 1935  MRN: [id number]    Chief Complaint: No chief complaint on file.      History of Present Illness:   Terra Mahan is a 89 y.o. female who presents to the ED for CP, weakness, SOB.   This happened 2 weeks ago.    Lasted few hours.   Seen by her PCP and sent here for further work-up.   Feels fine now.  \"Nervous\"       Focused Physical Exam:  VS:    ED Triage Vitals [25 1217]   BP Systolic BP Percentile Diastolic BP Percentile Temp Temp src Pulse Resp SpO2   -- -- -- 97.9 °F (36.6 °C) -- 59 -- 100 %      Height Weight         -- --              Physical Ex: Constitutional: Alert and non-toxic.    Medical History:  has a past medical history of Arthritis, History of PSVT (paroxysmal supraventricular tachycardia), [place] (hard of hearing), Hyperlipidemia, Hypertension, Normal nuclear stress test, Thyroid disease, and Vertigo.  Surgical History:  has a past surgical history that includes Carotid endarterectomy (?); Hysterectomy; Pilonidal cyst excision ('s); Tonsillectomy; Breast surgery ('s); eye surgery (); Cardiac catheterization (?); vascular surgery; Upper gastrointestinal endoscopy (2016); Colonoscopy (); Colonoscopy (2016); and Cholecystectomy, laparoscopic (N/A, 2023).  Social History:  reports that she has been smoking cigarettes. She has a 27 pack-year smoking history. She has never used smokeless tobacco. She reports that she does not drink alcohol and does not use drugs.  Family History: family history is not on file.    Allergies: Benazepril, Lipitor, Losartan, Pantoprazole, Pravachol [pravastatin sodium], and Zocor [simvastatin - high dose]     Initial Plan of Care: Initiate Treatment-Testing, Proceed toTreatment

## 2025-04-14 NOTE — ED PROVIDER NOTES
Patient presents emergency department with complaint of constipation for 2 weeks.  She states after she took Tums she had \"a spell\".  She states that the spell was where she felt weak and warm in her mouth.  She states that she was anxious and could not sit lie or stand for very long.  She denied any chest pain or shortness of breath since that episode 2 weeks ago.  Today she did see her PCP.  She states that her PCP started her on MiraLAX for the constipation she has been dealing with and sent her in for a chest x-ray and an abdominal x-ray.  Patient denies feeling lightheaded.  She states that she has had no chest discomfort or shortness of breath.  No abdominal pain, nausea or vomiting.  No peripheral edema.  She does admit to the occasional cough.    The history is provided by the patient.       Review of Systems   Constitutional:  Negative for activity change, appetite change, diaphoresis, fatigue, fever and unexpected weight change.   HENT: Negative.     Respiratory:  Positive for cough. Negative for shortness of breath.    Cardiovascular:  Negative for chest pain, palpitations and leg swelling.   Gastrointestinal:  Positive for constipation. Negative for abdominal pain, diarrhea, nausea and vomiting.   Genitourinary:  Negative for dysuria.   Musculoskeletal:  Negative for myalgias.   Skin: Negative.    Neurological:  Negative for dizziness, syncope, weakness and light-headedness.   Psychiatric/Behavioral:  The patient is nervous/anxious.        Physical Exam  Vitals and nursing note reviewed.   Constitutional:       General: She is not in acute distress.     Appearance: Normal appearance. She is well-developed and normal weight. She is not ill-appearing, toxic-appearing or diaphoretic.   HENT:      Head: Normocephalic and atraumatic.      Nose: Nose normal.      Mouth/Throat:      Mouth: Mucous membranes are moist.      Pharynx: Oropharynx is clear.   Eyes:      Extraocular Movements: Extraocular movements

## 2025-04-29 ENCOUNTER — TRANSCRIBE ORDERS (OUTPATIENT)
Dept: ADMINISTRATIVE | Age: 89
End: 2025-04-29

## 2025-04-29 DIAGNOSIS — C34.31 SQUAMOUS CELL CARCINOMA OF BRONCHUS IN RIGHT LOWER LOBE (HCC): Primary | ICD-10-CM

## 2025-05-09 ENCOUNTER — HOSPITAL ENCOUNTER (OUTPATIENT)
Dept: NUCLEAR MEDICINE | Age: 89
Discharge: HOME | End: 2025-05-09
Payer: MEDICARE

## 2025-05-09 DIAGNOSIS — C34.31 SQUAMOUS CELL CARCINOMA OF BRONCHUS IN RIGHT LOWER LOBE (HCC): ICD-10-CM

## 2025-05-09 PROCEDURE — A9609 HC RX DIAGNOSTIC RADIOPHARMACEUTICAL: HCPCS | Performed by: RADIOLOGY

## 2025-05-09 PROCEDURE — 3430000000 HC RX DIAGNOSTIC RADIOPHARMACEUTICAL: Performed by: RADIOLOGY

## 2025-05-09 PROCEDURE — 78815 PET IMAGE W/CT SKULL-THIGH: CPT

## 2025-05-09 RX ORDER — FLUDEOXYGLUCOSE F 18 200 MCI/ML
10 INJECTION, SOLUTION INTRAVENOUS
Status: COMPLETED | OUTPATIENT
Start: 2025-05-09 | End: 2025-05-09

## 2025-05-09 RX ADMIN — FLUDEOXYGLUCOSE F 18 10 MILLICURIE: 200 INJECTION, SOLUTION INTRAVENOUS at 10:50

## 2025-05-15 ENCOUNTER — TELEPHONE (OUTPATIENT)
Dept: CT IMAGING | Age: 89
End: 2025-05-15

## 2025-05-15 NOTE — TELEPHONE ENCOUNTER
Per Mildred Levys Dr. Nguyen reviewed chart and suggests Bronchoscopy w/biopsy instead of CT guided lung biopsy.  Spoke with Katiuska at Dr. López office and relayed information.  Electronically signed by Saniya Lyon on 5/15/2025 at 11:19 AM

## 2025-05-21 ENCOUNTER — HOSPITAL ENCOUNTER (OUTPATIENT)
Dept: RADIATION ONCOLOGY | Age: 89
Discharge: HOME | End: 2025-05-21
Payer: MEDICARE

## 2025-05-21 VITALS
RESPIRATION RATE: 18 BRPM | TEMPERATURE: 97.2 F | SYSTOLIC BLOOD PRESSURE: 184 MMHG | HEART RATE: 62 BPM | WEIGHT: 140.4 LBS | OXYGEN SATURATION: 99 % | BODY MASS INDEX: 25.68 KG/M2 | DIASTOLIC BLOOD PRESSURE: 79 MMHG

## 2025-05-21 PROBLEM — C79.51 SECONDARY MALIGNANT NEOPLASM OF BONE (HCC): Status: ACTIVE | Noted: 2025-05-21

## 2025-05-21 PROCEDURE — 99205 OFFICE O/P NEW HI 60 MIN: CPT

## 2025-05-21 SDOH — ECONOMIC STABILITY: HOUSING INSECURITY: PLEASE ASSESS YOUR PATIENT'S LEVEL OF DISTRESS CONCERNING HOUSING (SCALE FROM 1-10): 1

## 2025-05-21 NOTE — PROGRESS NOTES
nature of palliative radiation treatments for bone metastases    I reviewed risks benefits alternatives and potential side effects/toxicity of palliative radiation therapy to the right collarbone/shoulder possibly to include other lesions nearby in a single portal    Written informed consent was obtained    I am confident enough of the clinical diagnosis to move forward with CT simulation without tissue biopsy results.  Ideally we will have those results before turning the beam on for therapy although depending on the patient's pain level we may start treatment due to her pain prior to the biopsy results if they are delayed    Will likely treat to 3000 cGy in 10 fractions using 3D conformal radiation therapy    I stressed the palliative nature of radiation therapy and that any definitive therapy for this will likely come from the medical oncologist in the form of chemotherapy, immunotherapy, or other targeted therapy if feasible    60 minutes was spent in consultation reviewing records, images, taking history, examining the patient, documenting the encounter, counseling the patient, and coordinating care      NCCN guidelines, version 2020 is used to help review treatment recommendations.        Procedures and process involved with CT simulation and daily radiation treatments were explained.  Toxicities, both expected and less common, were reviewed in detail.  Questions were answered to their apparent satisfaction.     Thank you for the opportunity to participate in multidisciplinary management of this remarkable and pleasant patient.      Electronically signed by Anoop Dan MD on 5/21/2025 at 10:15 AM    Department of Radiation Oncology  [facility]: [phone] (FAX: [phone])  [facility]: [phone] (FAX: [phone])  [facility]:  [phone] (FAX:  [phone])    
the right shoulder. The patient expresses understanding and acceptance of instructions. Vesta Portillo RN 5/21/2025 9:32 AM           Vesta Portillo RN

## 2025-05-27 ENCOUNTER — TELEPHONE (OUTPATIENT)
Dept: RADIATION ONCOLOGY | Age: 89
End: 2025-05-27

## 2025-05-27 NOTE — TELEPHONE ENCOUNTER
Phone call placed to Terra taveras to triggered on Malnutrition Risk Screening Assessment when in for radiation consult with Dr. Dan. Message left requesting return call to [phone].

## 2025-05-29 ENCOUNTER — TELEPHONE (OUTPATIENT)
Age: 89
End: 2025-05-29

## 2025-05-29 NOTE — TELEPHONE ENCOUNTER
Reached out to patient via phone re: positive distress screen. Patient is a 89 year old female being treated with probable lung cancer with mets to bone.     No answer - voicemail left requesting return call.      2025   Distress Tool Screening    Please select the number that describes how much distress you have experienced in the PAST WEEK: 7    Please select the number that describes how much distress you have experienced TODAY: 7    Work, concerns about job: 1    Finances, bills, insurance: 1    Housin    Transportation: 1    Availability of caregivers: 1    Sadness/Depression: 5    Anxiety/Worry/Fear: 8    Concerns about appearance: 1    Connection to a higher power: 1    Sense of meaning and purpose: 2    Support from my spiritual community: 1    Nausea: 1    Eating/Loss of appetite: 8    Pain: 8    Fatigue: 8      MIRIAN Forrest, GONZALOW  Oncology Social Worker   [place]: [phone]  [place]: [phone]  [place]: [phone]

## 2025-05-30 ENCOUNTER — HOSPITAL ENCOUNTER (OUTPATIENT)
Dept: RADIATION ONCOLOGY | Age: 89
Discharge: HOME | End: 2025-05-30
Payer: MEDICARE

## 2025-06-04 ENCOUNTER — HOSPITAL ENCOUNTER (OUTPATIENT)
Dept: RADIATION ONCOLOGY | Age: 89
Discharge: HOME | End: 2025-06-04
Payer: MEDICARE

## 2025-06-04 ENCOUNTER — TELEPHONE (OUTPATIENT)
Dept: RADIATION ONCOLOGY | Age: 89
End: 2025-06-04

## 2025-06-04 PROCEDURE — 77295 3-D RADIOTHERAPY PLAN: CPT | Performed by: RADIOLOGY

## 2025-06-04 PROCEDURE — 77300 RADIATION THERAPY DOSE PLAN: CPT | Performed by: RADIOLOGY

## 2025-06-04 PROCEDURE — 77334 RADIATION TREATMENT AID(S): CPT | Performed by: RADIOLOGY

## 2025-06-04 NOTE — TELEPHONE ENCOUNTER
Called patient to check and see if she met with Pulmonologist, Dr. Ngo, had stated she had an appointment on 5/28/25. Called Dr. Ngo's office and stated patient is scheduled for consult on 6/06/25. Asked patient how her right clavicle pain is and if she would like to start RT for her pain. Patient stated that her shoulder hurts her when she gets up and rates her pain as a 10/10 on a pain scale of 1-10 when it is at it's worst. She stated she is taking Tylenol and using an artritis hot salve which alleviates her pain. She stated she would prefer to wait and meet with Dr. Ngo first, prior to starting any RT treatments. Encouraged patient to please reach out to our office if her pain worsens and she decides she would like to begin RT treatments. Provided office phone number and name for patient to contact us with any further needs.

## 2025-06-05 ENCOUNTER — TELEPHONE (OUTPATIENT)
Age: 89
End: 2025-06-05

## 2025-06-05 NOTE — TELEPHONE ENCOUNTER
Reached back out to patient via phone re: positive distress screen. Patient is an 89 year old being seen for probable lung cancer with mets to the bone (stage IV).     Introduced self and role of oncology social work. Reviewed distress screen dated 25:       2025   Distress Tool Screening    Please select the number that describes how much distress you have experienced in the PAST WEEK: 7    Please select the number that describes how much distress you have experienced TODAY: 7    Work, concerns about job: 1    Finances, bills, insurance: 1    Housin    Transportation: 1    Availability of caregivers: 1    Sadness/Depression: 5    Anxiety/Worry/Fear: 8    Concerns about appearance: 1    Connection to a higher power: 1    Sense of meaning and purpose: 2    Support from my spiritual community: 1    Nausea: 1    Eating/Loss of appetite: 8    Pain: 8    Fatigue: 8      Practical:  No immediate barriers to care identified at this time. Discussed [employer] Financial Assistance if ever needed in the future. Patient lives with DIL and son - independent, still drives.     Emotional:  Pt expressed no significant concerns re: depression/anxiety. Patient states she is doing alright, all things considered. Awaiting Pulm c/s tomorrow. Confirms support from children.     Spiritual:  No immediate needs identified at this time. Patient aware that pastoral care is available upon request.     Physical:  Patient reports pain in right clavicle, however, awaiting Pulm consult tomorrow before deciding if she would like to undergo palliative radiation.     No additional needs identified at this time.  Reviewed role of oncology SW and encouraged patient to notify this provider if additional needs arise.    MIRIAN Forrest, TRIPP  Oncology Social Worker   [place]: [phone]  [place]: [phone]  [place]: [phone]

## 2025-06-16 ENCOUNTER — CLINICAL DOCUMENTATION (OUTPATIENT)
Dept: RADIATION ONCOLOGY | Age: 89
End: 2025-06-16

## 2025-06-16 NOTE — PROGRESS NOTES
Phone call placed to Terra per request of Dr. Dan, radiation oncologist, due to decreased appetite and weight loss. Terar follows with Dr. López at [facility] for medical oncology. Terra saw Dr. Dan to discuss palliative radiation to right clavicular for pain. Terra reports decreased appetite and weight loss. She estimated weight was about 170# 1 year ago. Weight of 140# 6.4oz on 5/21/25. Terra reports variable frequency of eating, sometimes 3 meals per day, other days 4-5 meals. She voices she fills up quickly and rarely finishes her meal. Terra has started drinking Ensure. Discussed importance of eating frequently throughout the day and suggested 6 small meals. Also encouraged increasing Ensure to 2-3 per day due to weight loss. With Terra's approval, will send info through traditional mail of \"High Calorie/High Protein Snacks Ideas\" and \"High-Calorie Food List\" and coupons for Ensure. Contact information included and encouraged her to call for questions or concerns.

## 2025-06-18 RX ORDER — POLYETHYLENE GLYCOL 3350 17 G/17G
17 POWDER, FOR SOLUTION ORAL DAILY PRN
COMMUNITY

## 2025-06-18 RX ORDER — TRAMADOL HYDROCHLORIDE 50 MG/1
50 TABLET ORAL EVERY 6 HOURS PRN
COMMUNITY

## 2025-06-23 ENCOUNTER — APPOINTMENT (OUTPATIENT)
Dept: GENERAL RADIOLOGY | Age: 89
DRG: 280 | End: 2025-06-23
Attending: INTERNAL MEDICINE
Payer: MEDICARE

## 2025-06-23 ENCOUNTER — ANESTHESIA (OUTPATIENT)
Dept: ENDOSCOPY | Age: 89
End: 2025-06-23
Payer: MEDICARE

## 2025-06-23 ENCOUNTER — HOSPITAL ENCOUNTER (INPATIENT)
Age: 89
LOS: 7 days | Discharge: HOSPICE | DRG: 280 | End: 2025-06-30
Attending: INTERNAL MEDICINE | Admitting: INTERNAL MEDICINE
Payer: MEDICARE

## 2025-06-23 ENCOUNTER — ANESTHESIA EVENT (OUTPATIENT)
Dept: ENDOSCOPY | Age: 89
End: 2025-06-23
Payer: MEDICARE

## 2025-06-23 DIAGNOSIS — J98.4 CAVITARY LUNG DISEASE: ICD-10-CM

## 2025-06-23 DIAGNOSIS — R07.9 CHEST PAIN, UNSPECIFIED TYPE: ICD-10-CM

## 2025-06-23 DIAGNOSIS — I48.0 PAROXYSMAL ATRIAL FIBRILLATION (HCC): ICD-10-CM

## 2025-06-23 DIAGNOSIS — Z01.812 PRE-OPERATIVE LABORATORY EXAMINATION: Primary | ICD-10-CM

## 2025-06-23 PROBLEM — I48.91 ATRIAL FIBRILLATION WITH RAPID VENTRICULAR RESPONSE (HCC): Status: ACTIVE | Noted: 2025-06-23

## 2025-06-23 LAB
ALBUMIN SERPL-MCNC: 3.2 G/DL (ref 3.5–5.2)
ALP SERPL-CCNC: 97 U/L (ref 35–104)
ALT SERPL-CCNC: 11 U/L (ref 0–35)
ANION GAP SERPL CALCULATED.3IONS-SCNC: 21 MMOL/L (ref 7–16)
ANION GAP SERPL CALCULATED.3IONS-SCNC: 22 MMOL/L (ref 7–16)
AST SERPL-CCNC: 32 U/L (ref 0–35)
BILIRUB SERPL-MCNC: 0.5 MG/DL (ref 0–1.2)
BNP SERPL-MCNC: 906 PG/ML (ref 0–450)
BUN SERPL-MCNC: 18 MG/DL (ref 8–23)
BUN SERPL-MCNC: 19 MG/DL (ref 8–23)
CALCIUM SERPL-MCNC: 8.7 MG/DL (ref 8.8–10.2)
CALCIUM SERPL-MCNC: 9.9 MG/DL (ref 8.8–10.2)
CHLORIDE SERPL-SCNC: 94 MMOL/L (ref 98–107)
CHLORIDE SERPL-SCNC: 98 MMOL/L (ref 98–107)
CO2 SERPL-SCNC: 18 MMOL/L (ref 22–29)
CO2 SERPL-SCNC: 18 MMOL/L (ref 22–29)
CREAT SERPL-MCNC: 0.8 MG/DL (ref 0.5–1)
CREAT SERPL-MCNC: 0.9 MG/DL (ref 0.5–1)
ERYTHROCYTE [DISTWIDTH] IN BLOOD BY AUTOMATED COUNT: 13.3 % (ref 11.5–15)
GFR, ESTIMATED: 65 ML/MIN/1.73M2
GFR, ESTIMATED: 70 ML/MIN/1.73M2
GLUCOSE BLD-MCNC: 192 MG/DL (ref 74–99)
GLUCOSE BLD-MCNC: 219 MG/DL (ref 74–99)
GLUCOSE SERPL-MCNC: 185 MG/DL (ref 74–99)
GLUCOSE SERPL-MCNC: 407 MG/DL (ref 74–99)
HCT VFR BLD AUTO: 31.8 % (ref 34–48)
HGB BLD-MCNC: 10.8 G/DL (ref 11.5–15.5)
LACTATE BLDV-SCNC: 1.9 MMOL/L (ref 0.5–2.2)
MAGNESIUM SERPL-MCNC: 1.2 MG/DL (ref 1.6–2.4)
MCH RBC QN AUTO: 29.3 PG (ref 26–35)
MCHC RBC AUTO-ENTMCNC: 34 G/DL (ref 32–34.5)
MCV RBC AUTO: 86.4 FL (ref 80–99.9)
PARTIAL THROMBOPLASTIN TIME: 30.5 SEC (ref 24.5–35.1)
PHOSPHATE SERPL-MCNC: 1.8 MG/DL (ref 2.5–4.5)
PLATELET # BLD AUTO: 366 K/UL (ref 130–450)
PMV BLD AUTO: 9.7 FL (ref 7–12)
POTASSIUM SERPL-SCNC: 2.8 MMOL/L (ref 3.5–5.1)
POTASSIUM SERPL-SCNC: 3.1 MMOL/L (ref 3.5–5.1)
PROT SERPL-MCNC: 6.1 G/DL (ref 6.4–8.3)
RBC # BLD AUTO: 3.68 M/UL (ref 3.5–5.5)
SODIUM SERPL-SCNC: 134 MMOL/L (ref 136–145)
SODIUM SERPL-SCNC: 136 MMOL/L (ref 136–145)
TROPONIN I SERPL HS-MCNC: 39 NG/L (ref 0–14)
TSH SERPL DL<=0.05 MIU/L-ACNC: 0.69 UIU/ML (ref 0.27–4.2)
WBC OTHER # BLD: 11.1 K/UL (ref 4.5–11.5)

## 2025-06-23 PROCEDURE — 3609027000 HC BRONCHOSCOPY: Performed by: INTERNAL MEDICINE

## 2025-06-23 PROCEDURE — 84484 ASSAY OF TROPONIN QUANT: CPT

## 2025-06-23 PROCEDURE — 84100 ASSAY OF PHOSPHORUS: CPT

## 2025-06-23 PROCEDURE — 85027 COMPLETE CBC AUTOMATED: CPT

## 2025-06-23 PROCEDURE — 2500000003 HC RX 250 WO HCPCS

## 2025-06-23 PROCEDURE — 83605 ASSAY OF LACTIC ACID: CPT

## 2025-06-23 PROCEDURE — 2580000003 HC RX 258: Performed by: ANESTHESIOLOGY

## 2025-06-23 PROCEDURE — 6360000002 HC RX W HCPCS

## 2025-06-23 PROCEDURE — 3609011300 HC BRONCHOSCOPY BRONCHIAL/ENDOBRNCL BX 1+ SITES: Performed by: INTERNAL MEDICINE

## 2025-06-23 PROCEDURE — 2700000000 HC OXYGEN THERAPY PER DAY

## 2025-06-23 PROCEDURE — 88341 IMHCHEM/IMCYTCHM EA ADD ANTB: CPT

## 2025-06-23 PROCEDURE — 3609020000 HC BRONCHOSCOPY W/EBUS FNA 3/> NODE: Performed by: INTERNAL MEDICINE

## 2025-06-23 PROCEDURE — 3700000000 HC ANESTHESIA ATTENDED CARE: Performed by: INTERNAL MEDICINE

## 2025-06-23 PROCEDURE — 84443 ASSAY THYROID STIM HORMONE: CPT

## 2025-06-23 PROCEDURE — 7100000000 HC PACU RECOVERY - FIRST 15 MIN: Performed by: INTERNAL MEDICINE

## 2025-06-23 PROCEDURE — 2500000003 HC RX 250 WO HCPCS: Performed by: ANESTHESIOLOGY

## 2025-06-23 PROCEDURE — 2709999900 HC NON-CHARGEABLE SUPPLY: Performed by: INTERNAL MEDICINE

## 2025-06-23 PROCEDURE — 6370000000 HC RX 637 (ALT 250 FOR IP): Performed by: ANESTHESIOLOGY

## 2025-06-23 PROCEDURE — 99221 1ST HOSP IP/OBS SF/LOW 40: CPT | Performed by: STUDENT IN AN ORGANIZED HEALTH CARE EDUCATION/TRAINING PROGRAM

## 2025-06-23 PROCEDURE — 0BB48ZX EXCISION OF RIGHT UPPER LOBE BRONCHUS, VIA NATURAL OR ARTIFICIAL OPENING ENDOSCOPIC, DIAGNOSTIC: ICD-10-PCS | Performed by: FAMILY MEDICINE

## 2025-06-23 PROCEDURE — 80048 BASIC METABOLIC PNL TOTAL CA: CPT

## 2025-06-23 PROCEDURE — 93005 ELECTROCARDIOGRAM TRACING: CPT | Performed by: ANESTHESIOLOGY

## 2025-06-23 PROCEDURE — 83735 ASSAY OF MAGNESIUM: CPT

## 2025-06-23 PROCEDURE — 88172 CYTP DX EVAL FNA 1ST EA SITE: CPT

## 2025-06-23 PROCEDURE — 3609011100 HC BRONCHOSCOPY BRUSHINGS: Performed by: INTERNAL MEDICINE

## 2025-06-23 PROCEDURE — 82010 KETONE BODYS QUAN: CPT

## 2025-06-23 PROCEDURE — 88112 CYTOPATH CELL ENHANCE TECH: CPT

## 2025-06-23 PROCEDURE — 6370000000 HC RX 637 (ALT 250 FOR IP)

## 2025-06-23 PROCEDURE — 88173 CYTOPATH EVAL FNA REPORT: CPT

## 2025-06-23 PROCEDURE — 88305 TISSUE EXAM BY PATHOLOGIST: CPT

## 2025-06-23 PROCEDURE — 82962 GLUCOSE BLOOD TEST: CPT

## 2025-06-23 PROCEDURE — 2580000003 HC RX 258: Performed by: INTERNAL MEDICINE

## 2025-06-23 PROCEDURE — 2000000000 HC ICU R&B

## 2025-06-23 PROCEDURE — 71045 X-RAY EXAM CHEST 1 VIEW: CPT

## 2025-06-23 PROCEDURE — 2580000003 HC RX 258

## 2025-06-23 PROCEDURE — 7100000001 HC PACU RECOVERY - ADDTL 15 MIN: Performed by: INTERNAL MEDICINE

## 2025-06-23 PROCEDURE — 80053 COMPREHEN METABOLIC PANEL: CPT

## 2025-06-23 PROCEDURE — 3700000001 HC ADD 15 MINUTES (ANESTHESIA): Performed by: INTERNAL MEDICINE

## 2025-06-23 PROCEDURE — 88342 IMHCHEM/IMCYTCHM 1ST ANTB: CPT

## 2025-06-23 PROCEDURE — 85730 THROMBOPLASTIN TIME PARTIAL: CPT

## 2025-06-23 PROCEDURE — 0BD48ZX EXTRACTION OF RIGHT UPPER LOBE BRONCHUS, VIA NATURAL OR ARTIFICIAL OPENING ENDOSCOPIC, DIAGNOSTIC: ICD-10-PCS | Performed by: FAMILY MEDICINE

## 2025-06-23 PROCEDURE — 94640 AIRWAY INHALATION TREATMENT: CPT

## 2025-06-23 PROCEDURE — 83880 ASSAY OF NATRIURETIC PEPTIDE: CPT

## 2025-06-23 PROCEDURE — 2720000010 HC SURG SUPPLY STERILE: Performed by: INTERNAL MEDICINE

## 2025-06-23 RX ORDER — CLOPIDOGREL BISULFATE 75 MG/1
75 TABLET ORAL DAILY
Status: DISCONTINUED | OUTPATIENT
Start: 2025-06-23 | End: 2025-06-26

## 2025-06-23 RX ORDER — SODIUM CHLORIDE 0.9 % (FLUSH) 0.9 %
5-40 SYRINGE (ML) INJECTION PRN
Status: DISCONTINUED | OUTPATIENT
Start: 2025-06-23 | End: 2025-06-23 | Stop reason: HOSPADM

## 2025-06-23 RX ORDER — ATENOLOL AND CHLORTHALIDONE TABLET 100; 25 MG/1; MG/1
1 TABLET ORAL DAILY
Status: DISCONTINUED | OUTPATIENT
Start: 2025-06-23 | End: 2025-06-24

## 2025-06-23 RX ORDER — SODIUM CHLORIDE 9 MG/ML
INJECTION, SOLUTION INTRAVENOUS PRN
Status: DISCONTINUED | OUTPATIENT
Start: 2025-06-23 | End: 2025-06-29

## 2025-06-23 RX ORDER — SODIUM CHLORIDE 0.9 % (FLUSH) 0.9 %
5-40 SYRINGE (ML) INJECTION EVERY 12 HOURS SCHEDULED
Status: DISCONTINUED | OUTPATIENT
Start: 2025-06-23 | End: 2025-06-29

## 2025-06-23 RX ORDER — MEPERIDINE HYDROCHLORIDE 25 MG/ML
12.5 INJECTION INTRAMUSCULAR; INTRAVENOUS; SUBCUTANEOUS EVERY 5 MIN PRN
Status: DISCONTINUED | OUTPATIENT
Start: 2025-06-23 | End: 2025-06-23 | Stop reason: HOSPADM

## 2025-06-23 RX ORDER — ROCURONIUM BROMIDE 10 MG/ML
INJECTION, SOLUTION INTRAVENOUS
Status: DISCONTINUED | OUTPATIENT
Start: 2025-06-23 | End: 2025-06-23 | Stop reason: SDUPTHER

## 2025-06-23 RX ORDER — SODIUM CHLORIDE 9 MG/ML
INJECTION, SOLUTION INTRAVENOUS PRN
Status: DISCONTINUED | OUTPATIENT
Start: 2025-06-23 | End: 2025-06-23 | Stop reason: HOSPADM

## 2025-06-23 RX ORDER — ADENOSINE 3 MG/ML
INJECTION, SOLUTION INTRAVENOUS
Status: COMPLETED
Start: 2025-06-23 | End: 2025-06-23

## 2025-06-23 RX ORDER — HYDROMORPHONE HYDROCHLORIDE 1 MG/ML
0.5 INJECTION, SOLUTION INTRAMUSCULAR; INTRAVENOUS; SUBCUTANEOUS EVERY 5 MIN PRN
Status: DISCONTINUED | OUTPATIENT
Start: 2025-06-23 | End: 2025-06-23 | Stop reason: HOSPADM

## 2025-06-23 RX ORDER — ONDANSETRON 2 MG/ML
4 INJECTION INTRAMUSCULAR; INTRAVENOUS EVERY 6 HOURS PRN
Status: DISCONTINUED | OUTPATIENT
Start: 2025-06-23 | End: 2025-06-27

## 2025-06-23 RX ORDER — POLYETHYLENE GLYCOL 3350 17 G/17G
17 POWDER, FOR SOLUTION ORAL DAILY PRN
Status: DISCONTINUED | OUTPATIENT
Start: 2025-06-23 | End: 2025-06-28

## 2025-06-23 RX ORDER — PHENYLEPHRINE HCL IN 0.9% NACL 1 MG/10 ML
SYRINGE (ML) INTRAVENOUS
Status: DISCONTINUED | OUTPATIENT
Start: 2025-06-23 | End: 2025-06-23 | Stop reason: SDUPTHER

## 2025-06-23 RX ORDER — HEPARIN SODIUM 10000 [USP'U]/100ML
5-30 INJECTION, SOLUTION INTRAVENOUS CONTINUOUS
Status: DISCONTINUED | OUTPATIENT
Start: 2025-06-23 | End: 2025-06-25

## 2025-06-23 RX ORDER — PROPOFOL 10 MG/ML
INJECTION, EMULSION INTRAVENOUS
Status: DISCONTINUED | OUTPATIENT
Start: 2025-06-23 | End: 2025-06-23 | Stop reason: SDUPTHER

## 2025-06-23 RX ORDER — HEPARIN SODIUM 1000 [USP'U]/ML
60 INJECTION, SOLUTION INTRAVENOUS; SUBCUTANEOUS PRN
Status: DISCONTINUED | OUTPATIENT
Start: 2025-06-23 | End: 2025-06-27

## 2025-06-23 RX ORDER — NALOXONE HYDROCHLORIDE 0.4 MG/ML
INJECTION, SOLUTION INTRAMUSCULAR; INTRAVENOUS; SUBCUTANEOUS PRN
Status: DISCONTINUED | OUTPATIENT
Start: 2025-06-23 | End: 2025-06-23 | Stop reason: HOSPADM

## 2025-06-23 RX ORDER — ACETAMINOPHEN 650 MG/1
650 SUPPOSITORY RECTAL EVERY 6 HOURS PRN
Status: DISCONTINUED | OUTPATIENT
Start: 2025-06-23 | End: 2025-07-01 | Stop reason: HOSPADM

## 2025-06-23 RX ORDER — DILTIAZEM HYDROCHLORIDE 5 MG/ML
INJECTION INTRAVENOUS
Status: DISCONTINUED
Start: 2025-06-23 | End: 2025-06-23 | Stop reason: WASHOUT

## 2025-06-23 RX ORDER — SODIUM CHLORIDE 9 MG/ML
INJECTION, SOLUTION INTRAVENOUS CONTINUOUS
Status: DISCONTINUED | OUTPATIENT
Start: 2025-06-23 | End: 2025-06-23 | Stop reason: HOSPADM

## 2025-06-23 RX ORDER — FENTANYL CITRATE 50 UG/ML
INJECTION, SOLUTION INTRAMUSCULAR; INTRAVENOUS
Status: DISCONTINUED | OUTPATIENT
Start: 2025-06-23 | End: 2025-06-23 | Stop reason: SDUPTHER

## 2025-06-23 RX ORDER — ONDANSETRON 2 MG/ML
INJECTION INTRAMUSCULAR; INTRAVENOUS
Status: DISCONTINUED | OUTPATIENT
Start: 2025-06-23 | End: 2025-06-23 | Stop reason: SDUPTHER

## 2025-06-23 RX ORDER — POTASSIUM CHLORIDE 1500 MG/1
40 TABLET, EXTENDED RELEASE ORAL ONCE
Status: COMPLETED | OUTPATIENT
Start: 2025-06-23 | End: 2025-06-23

## 2025-06-23 RX ORDER — EZETIMIBE 10 MG/1
10 TABLET ORAL DAILY
Status: DISCONTINUED | OUTPATIENT
Start: 2025-06-23 | End: 2025-06-29

## 2025-06-23 RX ORDER — INSULIN LISPRO 100 [IU]/ML
0-6 INJECTION, SOLUTION INTRAVENOUS; SUBCUTANEOUS
Status: DISCONTINUED | OUTPATIENT
Start: 2025-06-23 | End: 2025-06-24

## 2025-06-23 RX ORDER — DEXAMETHASONE SODIUM PHOSPHATE 10 MG/ML
INJECTION, SOLUTION INTRA-ARTICULAR; INTRALESIONAL; INTRAMUSCULAR; INTRAVENOUS; SOFT TISSUE
Status: DISCONTINUED | OUTPATIENT
Start: 2025-06-23 | End: 2025-06-23 | Stop reason: SDUPTHER

## 2025-06-23 RX ORDER — HEPARIN SODIUM 1000 [USP'U]/ML
30 INJECTION, SOLUTION INTRAVENOUS; SUBCUTANEOUS PRN
Status: DISCONTINUED | OUTPATIENT
Start: 2025-06-23 | End: 2025-06-27

## 2025-06-23 RX ORDER — ACETAMINOPHEN 325 MG/1
650 TABLET ORAL EVERY 6 HOURS PRN
Status: DISCONTINUED | OUTPATIENT
Start: 2025-06-23 | End: 2025-07-01 | Stop reason: HOSPADM

## 2025-06-23 RX ORDER — SCOPOLAMINE 1 MG/3D
1 PATCH, EXTENDED RELEASE TRANSDERMAL
Status: DISCONTINUED | OUTPATIENT
Start: 2025-06-23 | End: 2025-06-25

## 2025-06-23 RX ORDER — SODIUM CHLORIDE 0.9 % (FLUSH) 0.9 %
5-40 SYRINGE (ML) INJECTION EVERY 12 HOURS SCHEDULED
Status: DISCONTINUED | OUTPATIENT
Start: 2025-06-23 | End: 2025-06-23 | Stop reason: HOSPADM

## 2025-06-23 RX ORDER — IPRATROPIUM BROMIDE AND ALBUTEROL SULFATE 2.5; .5 MG/3ML; MG/3ML
1 SOLUTION RESPIRATORY (INHALATION) ONCE
Status: COMPLETED | OUTPATIENT
Start: 2025-06-23 | End: 2025-06-23

## 2025-06-23 RX ORDER — 0.9 % SODIUM CHLORIDE 0.9 %
1000 INTRAVENOUS SOLUTION INTRAVENOUS ONCE
Status: COMPLETED | OUTPATIENT
Start: 2025-06-23 | End: 2025-06-23

## 2025-06-23 RX ORDER — AMLODIPINE BESYLATE 5 MG/1
5 TABLET ORAL DAILY
Status: DISCONTINUED | OUTPATIENT
Start: 2025-06-24 | End: 2025-06-30

## 2025-06-23 RX ORDER — IPRATROPIUM BROMIDE AND ALBUTEROL SULFATE 2.5; .5 MG/3ML; MG/3ML
1 SOLUTION RESPIRATORY (INHALATION) EVERY 4 HOURS PRN
Status: DISCONTINUED | OUTPATIENT
Start: 2025-06-23 | End: 2025-07-01 | Stop reason: HOSPADM

## 2025-06-23 RX ORDER — ONDANSETRON 4 MG/1
4 TABLET, ORALLY DISINTEGRATING ORAL EVERY 8 HOURS PRN
Status: DISCONTINUED | OUTPATIENT
Start: 2025-06-23 | End: 2025-06-27

## 2025-06-23 RX ORDER — LIDOCAINE HYDROCHLORIDE 20 MG/ML
INJECTION, SOLUTION INTRAVENOUS
Status: DISCONTINUED | OUTPATIENT
Start: 2025-06-23 | End: 2025-06-23 | Stop reason: SDUPTHER

## 2025-06-23 RX ORDER — MAGNESIUM SULFATE IN WATER 40 MG/ML
2000 INJECTION, SOLUTION INTRAVENOUS ONCE
Status: COMPLETED | OUTPATIENT
Start: 2025-06-23 | End: 2025-06-23

## 2025-06-23 RX ORDER — DILTIAZEM HYDROCHLORIDE 5 MG/ML
15 INJECTION INTRAVENOUS ONCE
Status: COMPLETED | OUTPATIENT
Start: 2025-06-23 | End: 2025-06-23

## 2025-06-23 RX ORDER — SODIUM CHLORIDE 0.9 % (FLUSH) 0.9 %
5-40 SYRINGE (ML) INJECTION PRN
Status: DISCONTINUED | OUTPATIENT
Start: 2025-06-23 | End: 2025-06-29

## 2025-06-23 RX ADMIN — SODIUM CHLORIDE, PRESERVATIVE FREE 10 ML: 5 INJECTION INTRAVENOUS at 19:40

## 2025-06-23 RX ADMIN — FENTANYL CITRATE 50 MCG: 50 INJECTION, SOLUTION INTRAMUSCULAR; INTRAVENOUS at 13:09

## 2025-06-23 RX ADMIN — PROPOFOL 90 MG: 10 INJECTION, EMULSION INTRAVENOUS at 13:09

## 2025-06-23 RX ADMIN — ADENOSINE 6 MG: 3 INJECTION, SOLUTION INTRAVENOUS at 17:13

## 2025-06-23 RX ADMIN — SUGAMMADEX 200 MG: 100 INJECTION, SOLUTION INTRAVENOUS at 13:56

## 2025-06-23 RX ADMIN — HEPARIN SODIUM 12 UNITS/KG/HR: 10000 INJECTION, SOLUTION INTRAVENOUS at 19:26

## 2025-06-23 RX ADMIN — LIDOCAINE HYDROCHLORIDE 80 MG: 20 INJECTION, SOLUTION INTRAVENOUS at 13:09

## 2025-06-23 RX ADMIN — IPRATROPIUM BROMIDE AND ALBUTEROL SULFATE 1 DOSE: .5; 3 SOLUTION RESPIRATORY (INHALATION) at 16:00

## 2025-06-23 RX ADMIN — AMIODARONE HYDROCHLORIDE 150 MG: 50 INJECTION, SOLUTION INTRAVENOUS at 19:26

## 2025-06-23 RX ADMIN — POTASSIUM CHLORIDE 40 MEQ: 1500 TABLET, EXTENDED RELEASE ORAL at 21:46

## 2025-06-23 RX ADMIN — PROPOFOL 75 MCG/KG/MIN: 10 INJECTION, EMULSION INTRAVENOUS at 13:14

## 2025-06-23 RX ADMIN — ROCURONIUM BROMIDE 40 MG: 10 INJECTION, SOLUTION INTRAVENOUS at 13:09

## 2025-06-23 RX ADMIN — SODIUM CHLORIDE 1000 ML: 0.9 INJECTION, SOLUTION INTRAVENOUS at 18:57

## 2025-06-23 RX ADMIN — Medication 100 MCG: at 13:37

## 2025-06-23 RX ADMIN — DILTIAZEM HYDROCHLORIDE 15 MG: 5 INJECTION INTRAVENOUS at 17:41

## 2025-06-23 RX ADMIN — AMIODARONE HYDROCHLORIDE 1 MG/MIN: 1.8 INJECTION, SOLUTION INTRAVENOUS at 19:39

## 2025-06-23 RX ADMIN — MAGNESIUM SULFATE HEPTAHYDRATE 2000 MG: 40 INJECTION, SOLUTION INTRAVENOUS at 21:48

## 2025-06-23 RX ADMIN — SODIUM CHLORIDE: 0.9 INJECTION, SOLUTION INTRAVENOUS at 11:45

## 2025-06-23 RX ADMIN — POTASSIUM PHOSPHATE, MONOBASIC AND POTASSIUM PHOSPHATE, DIBASIC 20 MMOL: 224; 236 INJECTION, SOLUTION, CONCENTRATE INTRAVENOUS at 23:44

## 2025-06-23 RX ADMIN — ONDANSETRON 4 MG: 2 INJECTION, SOLUTION INTRAMUSCULAR; INTRAVENOUS at 13:51

## 2025-06-23 RX ADMIN — Medication 100 MCG: at 13:47

## 2025-06-23 RX ADMIN — SODIUM CHLORIDE 10 MG/HR: 900 INJECTION, SOLUTION INTRAVENOUS at 17:27

## 2025-06-23 RX ADMIN — SODIUM CHLORIDE, PRESERVATIVE FREE 10 ML: 5 INJECTION INTRAVENOUS at 19:39

## 2025-06-23 RX ADMIN — FENTANYL CITRATE 50 MCG: 50 INJECTION, SOLUTION INTRAMUSCULAR; INTRAVENOUS at 13:24

## 2025-06-23 RX ADMIN — DEXAMETHASONE SODIUM PHOSPHATE 10 MG: 10 INJECTION INTRAMUSCULAR; INTRAVENOUS at 13:09

## 2025-06-23 RX ADMIN — SODIUM CHLORIDE: 0.9 INJECTION, SOLUTION INTRAVENOUS at 21:48

## 2025-06-23 RX ADMIN — DILTIAZEM HYDROCHLORIDE 15 MG: 5 INJECTION, SOLUTION INTRAVENOUS at 17:41

## 2025-06-23 ASSESSMENT — PAIN - FUNCTIONAL ASSESSMENT
PAIN_FUNCTIONAL_ASSESSMENT: NONE - DENIES PAIN
PAIN_FUNCTIONAL_ASSESSMENT: 0-10

## 2025-06-23 ASSESSMENT — PAIN SCALES - GENERAL
PAINLEVEL_OUTOF10: 0

## 2025-06-23 ASSESSMENT — LIFESTYLE VARIABLES: SMOKING_STATUS: 1

## 2025-06-23 NOTE — ANESTHESIA POSTPROCEDURE EVALUATION
Department of Anesthesiology  Postprocedure Note    Patient: Terra Mahan  MRN: [id number]  YOB: 1935  Date of evaluation: 6/23/2025    Procedure Summary       Date: 06/23/25 Room / Location: [place] [address] / [facility]    Anesthesia Start: 1259 Anesthesia Stop: 1421    Procedures:       BRONCHOSCOPY ENDOBRONCHIAL ULTRASOUND FINE NEEDLE ASPIRATION      BRONCHOSCOPY BIOPSY BRONCHUS      BRONCHOSCOPY DIAGNOSTIC OR CELL WASH ONLY      BRONCHOSCOPY BRUSHINGS Diagnosis:       Cavitary lung disease      (Cavitary lung disease [J98.4])    Surgeons: Best Ngo MD Responsible Provider: Kalyan Martinez MD    Anesthesia Type: General ASA Status: 3            Anesthesia Type: General    Caren Phase I: Caren Score: 9    Caren Phase II: Caren Score: 10    Anesthesia Post Evaluation    Patient location during evaluation: ICU  Patient participation: complete - patient participated  Level of consciousness: awake  Airway patency: patent  Nausea & Vomiting: no nausea and no vomiting  Cardiovascular status: tachycardic  Respiratory status: nasal cannula  Hydration status: stable  Comments: Pt. Went into A. Fib RVR in PACU.  Adenosine given.  Then to IV cardizem bolus and infusion.  PACU RN gave report to MICU attending.  Pt. To be transferred to MICU.  Pain management: adequate    No notable events documented.

## 2025-06-23 NOTE — OP NOTE
Operative Note      Patient: Terra Mahan  YOB: 1935  MRN: [id number]    Date of Procedure: 6/23/2025    Pre-Op Diagnosis Codes:      * Cavitary lung disease [J98.4]    Post-Op Diagnosis: Right hilar mass       Procedure(s):  BRONCHOSCOPY ENDOBRONCHIAL ULTRASOUND FINE NEEDLE ASPIRATION  BRONCHOSCOPY BIOPSY BRONCHUS    Surgeon(s):  Best Nog MD    Assistant:   * No surgical staff found *    Anesthesia: General    Estimated Blood Loss (mL): Minimal    Complications: None    Specimens:   ID Type Source Tests Collected by Time Destination   A : Bronch EBUS FNA-subcarinal -cyto Tissue Fine Needle Aspirate CYTOLOGY, NON-GYN Best Ngo MD 6/23/2025 1329    B : Bronch EBUS FNA-right hilum-cyto Tissue Fine Needle Aspirate CYTOLOGY, NON-GYN Best Ngo MD 6/23/2025 1337    C : bronch wash right upper lobe-cyto Respiratory Bronchial Washing CYTOLOGY, NON-GYN Best Ngo MD 6/23/2025 1349    D : bronch biopsy right upper lobe mass-path Tissue Bronchial Biopsy SURGICAL PATHOLOGY Best Ngo MD 6/23/2025 1350    E : bronch cytobrush right upper lobe mass-cyto Respiratory Bronchial [place] CYTOLOGY, NON-GYN Best Ngo MD 6/23/2025 1353        Implants:  * No implants in log *      Drains: * No LDAs found *    Findings:  Infection Present At Time Of Surgery (PATOS) (choose all levels that have infection present):  No infection present  Other Findings: Obstructing endobronchial mass rul.      Detailed Description of Procedure:   Via ETT, videobronchoscope passed.  Trachea was normal.  Catrina was minimally splayed.   RMB was normal.  Endobronchial submucosal mass seen RUL takeoff with partial obstruction of RUL.  RML and RLL examined to subsegments and were normal.  ANUPAM, lingula and LLL examined to subsegments and were normal.  EBUS scope used to identify subcarinal LN and 4 passes made with few atypical cells present.  Right hilar mass/ node then identified and 3

## 2025-06-23 NOTE — ANESTHESIA PRE PROCEDURE
Department of Anesthesiology  Preprocedure Note       Name:  Terra Mahan   Age:  89 y.o.  :  1935                                          MRN:  [id number]         Date:  2025      Surgeon: Surgeon(s):  Best Ngo MD    Procedure: Procedure(s):  BRONCHOSCOPY ENDOBRONCHIAL ULTRASOUND    Medications prior to admission:   Prior to Admission medications    Medication Sig Start Date End Date Taking? Authorizing Provider   traMADol (ULTRAM) 50 MG tablet Take 1 tablet by mouth every 6 hours as needed for Pain.   Yes Lucia Ram MD   Multiple Vitamins-Minerals (PRESERVISION AREDS PO) Take 1 tablet by mouth daily   Yes Lucia Ram MD   carboxymethylcellulose 1 % ophthalmic solution Place 1 drop into both eyes 3 times daily   Yes Lucia Ram MD   polyethylene glycol (GLYCOLAX) 17 g packet Take 1 packet by mouth daily as needed for Constipation   Yes Lucia Ram MD   guaiFENesin (MUCUS RELIEF ADULT PO) Take 1 tablet by mouth as needed   Yes Lucia Ram MD   acetaminophen (TYLENOL) 325 MG tablet Take 2 tablets by mouth every 6 hours as needed for Pain   Yes Lucia Ram MD   ezetimibe (ZETIA) 10 MG tablet Take 1 tablet by mouth daily am   Yes Lucia Ram MD   amlodipine (NORVASC) 10 MG tablet Take 0.5 tablets by mouth daily   Yes Lucia Ram MD   sucralfate (CARAFATE) 1 GM tablet Take 1 tablet by mouth 4 times daily 23   Brennen Oneill MD   famotidine (PEPCID) 20 MG tablet Take 1 tablet by mouth 2 times daily 23   Brennen Oneill MD   ondansetron (ZOFRAN-ODT) 4 MG disintegrating tablet Take 1 tablet by mouth every 8 hours as needed for Nausea or Vomiting 23   Brennen Oneill MD   famotidine (PEPCID) 20 MG tablet Take by mouth 23   Lucia Ram MD   dicyclomine (BENTYL) 10 MG capsule Take by mouth 23   Lucia Ram MD   atenolol-chlorthalidone (TENORETIC) 100-25 MG per tablet

## 2025-06-23 NOTE — H&P
Hospitalist History & Physical      PCP: Becca Amador MD    Date of Admission: 6/23/2025    Date of Service: Pt seen/examined on 6/23/2025 and is admitted to Inpatient with expected LOS greater than two midnights due to medical therapy.        Chief Complaint:  a fib after bronchoscopy     History Of Present Illness:    Ms. Terra Mahan, a 89 y.o. year old female  who  has a past medical history of Arthritis, History of PSVT (paroxysmal supraventricular tachycardia), [place] (hard of hearing), Hyperlipidemia, Hypertension, Normal nuclear stress test, Thyroid disease, and Vertigo.     Patient presented to outpatient bronchoscopy today and i underwent MBS with FNA biopsy by pulmonology.  Postprocedure patient went into A-fib with RVR associated with hypotension, she was given Cardizem bolus followed by drip.  Patient required ICU monitor postprocedure. [facility] was notified of admission.  Evaluated patient at bedside and MICU. Had no symptoms. Denied chest pain, palpitations, worsening breathing status. Son came later to bedside. No prior Afib, not on anticoagulation.      Past Medical History:   Diagnosis Date    Arthritis     arms,shoulders,hips    History of PSVT (paroxysmal supraventricular tachycardia) 2009    no episodes since    [place] (hard of hearing)     Hyperlipidemia     Hypertension     Normal nuclear stress test 1998 ?    fontanerosa    Thyroid disease     as teenager    Vertigo        Past Surgical History:   Procedure Laterality Date    BREAST SURGERY  1960's    left breast bx  neg    CARDIAC CATHETERIZATION  1996?    neg    CAROTID ENDARTERECTOMY  2004?    left   Dr OTTO    CHOLECYSTECTOMY, LAPAROSCOPIC N/A 6/8/2023    CHOLECYSTECTOMY LAPAROSCOPIC WITH CHOLANGIOGRAM (CPT 34509) performed by Brennen Oneill MD at [facility] OR    COLONOSCOPY  2003    neg    COLONOSCOPY  07/07/2016    polyps, transverse, sigmoid    EYE SURGERY  2010    esme iol implants    HYSTERECTOMY (CERVIX STATUS

## 2025-06-23 NOTE — ANESTHESIA POSTPROCEDURE EVALUATION
Department of Anesthesiology  Postprocedure Note    Patient: Terra Mahan  MRN: [id number]  YOB: 1935  Date of evaluation: 6/23/2025    Procedure Summary       Date: 06/23/25 Room / Location: [place] [address] / [facility]    Anesthesia Start: 1259 Anesthesia Stop: 1421    Procedures:       BRONCHOSCOPY ENDOBRONCHIAL ULTRASOUND FINE NEEDLE ASPIRATION      BRONCHOSCOPY BIOPSY BRONCHUS      BRONCHOSCOPY DIAGNOSTIC OR CELL WASH ONLY      BRONCHOSCOPY BRUSHINGS Diagnosis:       Cavitary lung disease      (Cavitary lung disease [J98.4])    Surgeons: Best Ngo MD Responsible Provider: Kalyan Martinez MD    Anesthesia Type: General ASA Status: 3            Anesthesia Type: General    Caren Phase I: Caren Score: 9    Caren Phase II:      Anesthesia Post Evaluation    Patient location during evaluation: PACU  Patient participation: complete - patient participated  Level of consciousness: awake  Pain score: 3  Airway patency: patent  Nausea & Vomiting: no nausea and no vomiting  Cardiovascular status: blood pressure returned to baseline  Respiratory status: acceptable  Hydration status: euvolemic    No notable events documented.

## 2025-06-24 ENCOUNTER — APPOINTMENT (OUTPATIENT)
Age: 89
DRG: 280 | End: 2025-06-24
Attending: INTERNAL MEDICINE
Payer: MEDICARE

## 2025-06-24 ENCOUNTER — APPOINTMENT (OUTPATIENT)
Dept: GENERAL RADIOLOGY | Age: 89
DRG: 280 | End: 2025-06-24
Attending: INTERNAL MEDICINE
Payer: MEDICARE

## 2025-06-24 LAB
ANION GAP SERPL CALCULATED.3IONS-SCNC: 15 MMOL/L (ref 7–16)
B-OH-BUTYR SERPL-MCNC: 1.83 MMOL/L (ref 0.02–0.27)
BASOPHILS # BLD: 0 K/UL (ref 0–0.2)
BASOPHILS NFR BLD: 0 % (ref 0–2)
BUN SERPL-MCNC: 18 MG/DL (ref 8–23)
CALCIUM SERPL-MCNC: 8.9 MG/DL (ref 8.8–10.2)
CHLORIDE SERPL-SCNC: 98 MMOL/L (ref 98–107)
CHOLEST SERPL-MCNC: 132 MG/DL
CO2 SERPL-SCNC: 21 MMOL/L (ref 22–29)
CREAT SERPL-MCNC: 0.8 MG/DL (ref 0.5–1)
ECHO AO ASC DIAM: 3.2 CM
ECHO AO ASCENDING AORTA INDEX: 2.01 CM/M2
ECHO AV AREA PEAK VELOCITY: 1.7 CM2
ECHO AV AREA VTI: 2.2 CM2
ECHO AV AREA/BSA PEAK VELOCITY: 1.1 CM2/M2
ECHO AV AREA/BSA VTI: 1.4 CM2/M2
ECHO AV CUSP MM: 1 CM
ECHO AV MEAN GRADIENT: 7 MMHG
ECHO AV MEAN VELOCITY: 1.2 M/S
ECHO AV PEAK GRADIENT: 14 MMHG
ECHO AV PEAK VELOCITY: 1.8 M/S
ECHO AV VELOCITY RATIO: 0.61
ECHO AV VTI: 34 CM
ECHO BSA: 1.61 M2
ECHO EST RA PRESSURE: 8 MMHG
ECHO LA DIAMETER INDEX: 2.39 CM/M2
ECHO LA DIAMETER: 3.8 CM
ECHO LA VOL A-L A2C: 55 ML (ref 22–52)
ECHO LA VOL A-L A4C: 52 ML (ref 22–52)
ECHO LA VOL BP: 55 ML (ref 22–52)
ECHO LA VOL MOD A2C: 53 ML (ref 22–52)
ECHO LA VOL MOD A4C: 46 ML (ref 22–52)
ECHO LA VOL/BSA BIPLANE: 35 ML/M2 (ref 16–34)
ECHO LA VOLUME AREA LENGTH: 59 ML
ECHO LA VOLUME INDEX A-L A2C: 35 ML/M2 (ref 16–34)
ECHO LA VOLUME INDEX A-L A4C: 33 ML/M2 (ref 16–34)
ECHO LA VOLUME INDEX AREA LENGTH: 37 ML/M2 (ref 16–34)
ECHO LA VOLUME INDEX MOD A2C: 33 ML/M2 (ref 16–34)
ECHO LA VOLUME INDEX MOD A4C: 29 ML/M2 (ref 16–34)
ECHO LV EF PHYSICIAN: 85 %
ECHO LV FRACTIONAL SHORTENING: 60 % (ref 28–44)
ECHO LV INTERNAL DIMENSION DIASTOLE INDEX: 2.7 CM/M2
ECHO LV INTERNAL DIMENSION DIASTOLIC: 4.3 CM (ref 3.9–5.3)
ECHO LV INTERNAL DIMENSION SYSTOLIC INDEX: 1.07 CM/M2
ECHO LV INTERNAL DIMENSION SYSTOLIC: 1.7 CM
ECHO LV ISOVOLUMETRIC RELAXATION TIME (IVRT): 87.5 MS
ECHO LV IVSD: 0.9 CM (ref 0.6–0.9)
ECHO LV MASS 2D: 132.7 G (ref 67–162)
ECHO LV MASS INDEX 2D: 83.5 G/M2 (ref 43–95)
ECHO LV POSTERIOR WALL DIASTOLIC: 1 CM (ref 0.6–0.9)
ECHO LV RELATIVE WALL THICKNESS RATIO: 0.47
ECHO LVOT AREA: 2.8 CM2
ECHO LVOT AV VTI INDEX: 0.79
ECHO LVOT DIAM: 1.9 CM
ECHO LVOT MEAN GRADIENT: 3 MMHG
ECHO LVOT PEAK GRADIENT: 5 MMHG
ECHO LVOT PEAK VELOCITY: 1.1 M/S
ECHO LVOT STROKE VOLUME INDEX: 47.8 ML/M2
ECHO LVOT SV: 75.9 ML
ECHO LVOT VTI: 26.8 CM
ECHO MV "A" WAVE DURATION: 83.7 MSEC
ECHO MV A VELOCITY: 1.31 M/S
ECHO MV AREA PHT: 3.2 CM2
ECHO MV AREA VTI: 2.2 CM2
ECHO MV E DECELERATION TIME (DT): 198.3 MS
ECHO MV E VELOCITY: 0.95 M/S
ECHO MV E/A RATIO: 0.73
ECHO MV LVOT VTI INDEX: 1.32
ECHO MV MAX VELOCITY: 1.3 M/S
ECHO MV MEAN GRADIENT: 2 MMHG
ECHO MV MEAN VELOCITY: 0.7 M/S
ECHO MV PEAK GRADIENT: 7 MMHG
ECHO MV PRESSURE HALF TIME (PHT): 68.8 MS
ECHO MV VTI: 35.3 CM
ECHO PV MAX VELOCITY: 0.8 M/S
ECHO PV MEAN GRADIENT: 2 MMHG
ECHO PV MEAN VELOCITY: 0.6 M/S
ECHO PV PEAK GRADIENT: 3 MMHG
ECHO PV VTI: 17.7 CM
ECHO RIGHT VENTRICULAR SYSTOLIC PRESSURE (RVSP): 48 MMHG
ECHO RV INTERNAL DIMENSION: 3.1 CM
ECHO RV LONGITUDINAL DIMENSION: 6.4 CM
ECHO RV MID DIMENSION: 2.8 CM
ECHO TV REGURGITANT MAX VELOCITY: 3.17 M/S
ECHO TV REGURGITANT PEAK GRADIENT: 40 MMHG
EKG ATRIAL RATE: 62 BPM
EKG ATRIAL RATE: 65 BPM
EKG ATRIAL RATE: 87 BPM
EKG P AXIS: 14 DEGREES
EKG P AXIS: 32 DEGREES
EKG P-R INTERVAL: 106 MS
EKG P-R INTERVAL: 110 MS
EKG Q-T INTERVAL: 268 MS
EKG Q-T INTERVAL: 452 MS
EKG Q-T INTERVAL: 476 MS
EKG QRS DURATION: 64 MS
EKG QRS DURATION: 68 MS
EKG QRS DURATION: 76 MS
EKG QTC CALCULATION (BAZETT): 435 MS
EKG QTC CALCULATION (BAZETT): 470 MS
EKG QTC CALCULATION (BAZETT): 483 MS
EKG R AXIS: 0 DEGREES
EKG R AXIS: 14 DEGREES
EKG R AXIS: 5 DEGREES
EKG T AXIS: 210 DEGREES
EKG T AXIS: 24 DEGREES
EKG T AXIS: 33 DEGREES
EKG VENTRICULAR RATE: 159 BPM
EKG VENTRICULAR RATE: 62 BPM
EKG VENTRICULAR RATE: 65 BPM
EOSINOPHIL # BLD: 0 K/UL (ref 0.05–0.5)
EOSINOPHILS RELATIVE PERCENT: 0 % (ref 0–6)
ERYTHROCYTE [DISTWIDTH] IN BLOOD BY AUTOMATED COUNT: 13.4 % (ref 11.5–15)
GFR, ESTIMATED: 71 ML/MIN/1.73M2
GLUCOSE BLD-MCNC: 135 MG/DL (ref 74–99)
GLUCOSE BLD-MCNC: 139 MG/DL (ref 74–99)
GLUCOSE BLD-MCNC: 141 MG/DL (ref 74–99)
GLUCOSE BLD-MCNC: 159 MG/DL (ref 74–99)
GLUCOSE SERPL-MCNC: 288 MG/DL (ref 74–99)
HBA1C MFR BLD: 5.7 % (ref 4–5.6)
HCT VFR BLD AUTO: 29.6 % (ref 34–48)
HDLC SERPL-MCNC: 40 MG/DL
HGB BLD-MCNC: 9.6 G/DL (ref 11.5–15.5)
LDLC SERPL CALC-MCNC: 73 MG/DL
LYMPHOCYTES NFR BLD: 0.29 K/UL (ref 1.5–4)
LYMPHOCYTES RELATIVE PERCENT: 4 % (ref 20–42)
MAGNESIUM SERPL-MCNC: 1.5 MG/DL (ref 1.6–2.4)
MCH RBC QN AUTO: 29 PG (ref 26–35)
MCHC RBC AUTO-ENTMCNC: 32.4 G/DL (ref 32–34.5)
MCV RBC AUTO: 89.4 FL (ref 80–99.9)
MONOCYTES NFR BLD: 0.36 K/UL (ref 0.1–0.95)
MONOCYTES NFR BLD: 4 % (ref 2–12)
NEUTROPHILS NFR BLD: 92 % (ref 43–80)
NEUTS SEG NFR BLD: 7.55 K/UL (ref 1.8–7.3)
PARTIAL THROMBOPLASTIN TIME: 56.6 SEC (ref 24.5–35.1)
PARTIAL THROMBOPLASTIN TIME: 65.5 SEC (ref 24.5–35.1)
PHOSPHATE SERPL-MCNC: 3.7 MG/DL (ref 2.5–4.5)
PLATELET # BLD AUTO: 309 K/UL (ref 130–450)
PMV BLD AUTO: 9.9 FL (ref 7–12)
POTASSIUM SERPL-SCNC: 4 MMOL/L (ref 3.5–5.1)
RBC # BLD AUTO: 3.31 M/UL (ref 3.5–5.5)
RBC # BLD: ABNORMAL 10*6/UL
SODIUM SERPL-SCNC: 134 MMOL/L (ref 136–145)
T4 FREE SERPL-MCNC: 1.5 NG/DL (ref 0.9–1.7)
TRIGL SERPL-MCNC: 96 MG/DL
TROPONIN I SERPL HS-MCNC: 180 NG/L (ref 0–14)
TROPONIN I SERPL HS-MCNC: 260 NG/L (ref 0–14)
TROPONIN I SERPL HS-MCNC: 270 NG/L (ref 0–14)
TROPONIN I SERPL HS-MCNC: 271 NG/L (ref 0–14)
VLDLC SERPL CALC-MCNC: 19 MG/DL
WBC OTHER # BLD: 8.2 K/UL (ref 4.5–11.5)

## 2025-06-24 PROCEDURE — 93010 ELECTROCARDIOGRAM REPORT: CPT | Performed by: INTERNAL MEDICINE

## 2025-06-24 PROCEDURE — 6370000000 HC RX 637 (ALT 250 FOR IP)

## 2025-06-24 PROCEDURE — 99223 1ST HOSP IP/OBS HIGH 75: CPT | Performed by: INTERNAL MEDICINE

## 2025-06-24 PROCEDURE — 71045 X-RAY EXAM CHEST 1 VIEW: CPT

## 2025-06-24 PROCEDURE — 2500000003 HC RX 250 WO HCPCS

## 2025-06-24 PROCEDURE — 2700000000 HC OXYGEN THERAPY PER DAY

## 2025-06-24 PROCEDURE — 84484 ASSAY OF TROPONIN QUANT: CPT

## 2025-06-24 PROCEDURE — 93005 ELECTROCARDIOGRAM TRACING: CPT

## 2025-06-24 PROCEDURE — 80048 BASIC METABOLIC PNL TOTAL CA: CPT

## 2025-06-24 PROCEDURE — 84439 ASSAY OF FREE THYROXINE: CPT

## 2025-06-24 PROCEDURE — 94640 AIRWAY INHALATION TREATMENT: CPT

## 2025-06-24 PROCEDURE — APPSS60 APP SPLIT SHARED TIME 46-60 MINUTES

## 2025-06-24 PROCEDURE — 80061 LIPID PANEL: CPT

## 2025-06-24 PROCEDURE — 82962 GLUCOSE BLOOD TEST: CPT

## 2025-06-24 PROCEDURE — 84100 ASSAY OF PHOSPHORUS: CPT

## 2025-06-24 PROCEDURE — 99233 SBSQ HOSP IP/OBS HIGH 50: CPT | Performed by: INTERNAL MEDICINE

## 2025-06-24 PROCEDURE — 85730 THROMBOPLASTIN TIME PARTIAL: CPT

## 2025-06-24 PROCEDURE — 83735 ASSAY OF MAGNESIUM: CPT

## 2025-06-24 PROCEDURE — 2580000003 HC RX 258

## 2025-06-24 PROCEDURE — 2000000000 HC ICU R&B

## 2025-06-24 PROCEDURE — 6360000002 HC RX W HCPCS

## 2025-06-24 PROCEDURE — 93306 TTE W/DOPPLER COMPLETE: CPT

## 2025-06-24 PROCEDURE — 83036 HEMOGLOBIN GLYCOSYLATED A1C: CPT

## 2025-06-24 PROCEDURE — 85025 COMPLETE CBC W/AUTO DIFF WBC: CPT

## 2025-06-24 PROCEDURE — 93306 TTE W/DOPPLER COMPLETE: CPT | Performed by: INTERNAL MEDICINE

## 2025-06-24 RX ORDER — ASPIRIN 81 MG/1
81 TABLET, CHEWABLE ORAL DAILY
Status: DISCONTINUED | OUTPATIENT
Start: 2025-06-24 | End: 2025-06-29

## 2025-06-24 RX ORDER — POTASSIUM CHLORIDE 1500 MG/1
80 TABLET, EXTENDED RELEASE ORAL ONCE
Status: DISCONTINUED | OUTPATIENT
Start: 2025-06-24 | End: 2025-06-24

## 2025-06-24 RX ORDER — LIDOCAINE 4 G/G
1 PATCH TOPICAL ONCE
Status: COMPLETED | OUTPATIENT
Start: 2025-06-24 | End: 2025-06-24

## 2025-06-24 RX ORDER — POTASSIUM CHLORIDE 1500 MG/1
40 TABLET, EXTENDED RELEASE ORAL ONCE
Status: COMPLETED | OUTPATIENT
Start: 2025-06-24 | End: 2025-06-24

## 2025-06-24 RX ORDER — TRAMADOL HYDROCHLORIDE 50 MG/1
50 TABLET ORAL ONCE
Status: COMPLETED | OUTPATIENT
Start: 2025-06-24 | End: 2025-06-24

## 2025-06-24 RX ORDER — ROSUVASTATIN CALCIUM 5 MG/1
5 TABLET, COATED ORAL NIGHTLY
Status: DISCONTINUED | OUTPATIENT
Start: 2025-06-24 | End: 2025-06-30

## 2025-06-24 RX ORDER — METOPROLOL SUCCINATE 50 MG/1
50 TABLET, EXTENDED RELEASE ORAL DAILY
Status: DISCONTINUED | OUTPATIENT
Start: 2025-06-24 | End: 2025-07-01 | Stop reason: HOSPADM

## 2025-06-24 RX ORDER — INSULIN LISPRO 100 [IU]/ML
0-8 INJECTION, SOLUTION INTRAVENOUS; SUBCUTANEOUS
Status: DISCONTINUED | OUTPATIENT
Start: 2025-06-24 | End: 2025-06-29

## 2025-06-24 RX ORDER — AMIODARONE HYDROCHLORIDE 200 MG/1
200 TABLET ORAL 2 TIMES DAILY
Status: DISCONTINUED | OUTPATIENT
Start: 2025-06-24 | End: 2025-07-01 | Stop reason: HOSPADM

## 2025-06-24 RX ORDER — LEVALBUTEROL INHALATION SOLUTION 0.63 MG/3ML
0.63 SOLUTION RESPIRATORY (INHALATION) EVERY 8 HOURS PRN
Status: DISCONTINUED | OUTPATIENT
Start: 2025-06-24 | End: 2025-06-27

## 2025-06-24 RX ORDER — MAGNESIUM SULFATE IN WATER 40 MG/ML
2000 INJECTION, SOLUTION INTRAVENOUS ONCE
Status: COMPLETED | OUTPATIENT
Start: 2025-06-24 | End: 2025-06-24

## 2025-06-24 RX ORDER — GUAIFENESIN 400 MG/1
400 TABLET ORAL 3 TIMES DAILY
Status: DISCONTINUED | OUTPATIENT
Start: 2025-06-24 | End: 2025-07-01 | Stop reason: HOSPADM

## 2025-06-24 RX ORDER — BUDESONIDE 0.5 MG/2ML
0.5 INHALANT ORAL
Status: DISCONTINUED | OUTPATIENT
Start: 2025-06-24 | End: 2025-07-01 | Stop reason: HOSPADM

## 2025-06-24 RX ORDER — AMIODARONE HYDROCHLORIDE 200 MG/1
200 TABLET ORAL 2 TIMES DAILY
Status: DISCONTINUED | OUTPATIENT
Start: 2025-06-25 | End: 2025-06-24

## 2025-06-24 RX ORDER — AMIODARONE HYDROCHLORIDE 200 MG/1
200 TABLET ORAL DAILY
Status: DISCONTINUED | OUTPATIENT
Start: 2025-07-02 | End: 2025-07-01 | Stop reason: HOSPADM

## 2025-06-24 RX ADMIN — METOPROLOL SUCCINATE 50 MG: 50 TABLET, EXTENDED RELEASE ORAL at 16:18

## 2025-06-24 RX ADMIN — ASPIRIN 81 MG CHEWABLE TABLET 81 MG: 81 TABLET CHEWABLE at 16:18

## 2025-06-24 RX ADMIN — AMIODARONE HYDROCHLORIDE 0.5 MG/MIN: 1.8 INJECTION, SOLUTION INTRAVENOUS at 11:45

## 2025-06-24 RX ADMIN — BUDESONIDE 500 MCG: 0.5 SUSPENSION RESPIRATORY (INHALATION) at 19:47

## 2025-06-24 RX ADMIN — MAGNESIUM SULFATE HEPTAHYDRATE 2000 MG: 40 INJECTION, SOLUTION INTRAVENOUS at 06:01

## 2025-06-24 RX ADMIN — ROSUVASTATIN CALCIUM 5 MG: 5 TABLET, FILM COATED ORAL at 21:21

## 2025-06-24 RX ADMIN — GUAIFENESIN 400 MG: 400 TABLET ORAL at 14:37

## 2025-06-24 RX ADMIN — GUAIFENESIN 400 MG: 400 TABLET ORAL at 21:09

## 2025-06-24 RX ADMIN — AMIODARONE HYDROCHLORIDE 200 MG: 200 TABLET ORAL at 16:18

## 2025-06-24 RX ADMIN — SODIUM CHLORIDE, PRESERVATIVE FREE 10 ML: 5 INJECTION INTRAVENOUS at 08:52

## 2025-06-24 RX ADMIN — TRAMADOL HYDROCHLORIDE 50 MG: 50 TABLET, COATED ORAL at 19:01

## 2025-06-24 RX ADMIN — SODIUM CHLORIDE 10 ML/HR: 0.9 INJECTION, SOLUTION INTRAVENOUS at 04:39

## 2025-06-24 RX ADMIN — SODIUM CHLORIDE, PRESERVATIVE FREE 10 ML: 5 INJECTION INTRAVENOUS at 21:10

## 2025-06-24 RX ADMIN — POTASSIUM CHLORIDE 40 MEQ: 1500 TABLET, EXTENDED RELEASE ORAL at 03:52

## 2025-06-24 RX ADMIN — GUAIFENESIN 400 MG: 400 TABLET ORAL at 09:16

## 2025-06-24 RX ADMIN — INSULIN LISPRO 1 UNITS: 100 INJECTION, SOLUTION INTRAVENOUS; SUBCUTANEOUS at 06:08

## 2025-06-24 ASSESSMENT — PAIN SCALES - GENERAL
PAINLEVEL_OUTOF10: 0
PAINLEVEL_OUTOF10: 7
PAINLEVEL_OUTOF10: 0
PAINLEVEL_OUTOF10: 0
PAINLEVEL_OUTOF10: 6
PAINLEVEL_OUTOF10: 0
PAINLEVEL_OUTOF10: 7
PAINLEVEL_OUTOF10: 0

## 2025-06-24 ASSESSMENT — PAIN DESCRIPTION - ORIENTATION
ORIENTATION: RIGHT

## 2025-06-24 ASSESSMENT — PAIN DESCRIPTION - DESCRIPTORS
DESCRIPTORS: ACHING;DULL;DISCOMFORT
DESCRIPTORS: DISCOMFORT;SORE;DULL
DESCRIPTORS: DISCOMFORT;ACHING;SORE

## 2025-06-24 ASSESSMENT — PAIN - FUNCTIONAL ASSESSMENT: PAIN_FUNCTIONAL_ASSESSMENT: ACTIVITIES ARE NOT PREVENTED

## 2025-06-24 ASSESSMENT — PAIN DESCRIPTION - PAIN TYPE
TYPE: ACUTE PAIN
TYPE: ACUTE PAIN

## 2025-06-24 ASSESSMENT — PAIN DESCRIPTION - FREQUENCY
FREQUENCY: CONTINUOUS
FREQUENCY: CONTINUOUS

## 2025-06-24 ASSESSMENT — PAIN DESCRIPTION - LOCATION
LOCATION: NECK

## 2025-06-24 ASSESSMENT — PAIN DESCRIPTION - ONSET
ONSET: ON-GOING
ONSET: ON-GOING

## 2025-06-25 ENCOUNTER — APPOINTMENT (OUTPATIENT)
Dept: GENERAL RADIOLOGY | Age: 89
DRG: 280 | End: 2025-06-25
Attending: INTERNAL MEDICINE
Payer: MEDICARE

## 2025-06-25 ENCOUNTER — APPOINTMENT (OUTPATIENT)
Age: 89
DRG: 280 | End: 2025-06-25
Attending: INTERNAL MEDICINE
Payer: MEDICARE

## 2025-06-25 LAB
ANION GAP SERPL CALCULATED.3IONS-SCNC: 10 MMOL/L (ref 7–16)
BASOPHILS # BLD: 0.02 K/UL (ref 0–0.2)
BASOPHILS NFR BLD: 0 % (ref 0–2)
BUN SERPL-MCNC: 14 MG/DL (ref 8–23)
CALCIUM SERPL-MCNC: 9.4 MG/DL (ref 8.8–10.2)
CHLORIDE SERPL-SCNC: 99 MMOL/L (ref 98–107)
CO2 SERPL-SCNC: 25 MMOL/L (ref 22–29)
CREAT SERPL-MCNC: 0.8 MG/DL (ref 0.5–1)
ECHO BSA: 1.61 M2
EOSINOPHIL # BLD: 0.04 K/UL (ref 0.05–0.5)
EOSINOPHILS RELATIVE PERCENT: 0 % (ref 0–6)
ERYTHROCYTE [DISTWIDTH] IN BLOOD BY AUTOMATED COUNT: 13.8 % (ref 11.5–15)
ERYTHROCYTE [DISTWIDTH] IN BLOOD BY AUTOMATED COUNT: 14.1 % (ref 11.5–15)
GFR, ESTIMATED: 75 ML/MIN/1.73M2
GLUCOSE BLD-MCNC: 100 MG/DL (ref 74–99)
GLUCOSE BLD-MCNC: 101 MG/DL (ref 74–99)
GLUCOSE BLD-MCNC: 102 MG/DL (ref 74–99)
GLUCOSE SERPL-MCNC: 105 MG/DL (ref 74–99)
HCT VFR BLD AUTO: 29.1 % (ref 34–48)
HCT VFR BLD AUTO: 29.8 % (ref 34–48)
HGB BLD-MCNC: 9.4 G/DL (ref 11.5–15.5)
HGB BLD-MCNC: 9.7 G/DL (ref 11.5–15.5)
IMM GRANULOCYTES # BLD AUTO: 0.06 K/UL (ref 0–0.58)
IMM GRANULOCYTES NFR BLD: 1 % (ref 0–5)
LYMPHOCYTES NFR BLD: 0.83 K/UL (ref 1.5–4)
LYMPHOCYTES RELATIVE PERCENT: 7 % (ref 20–42)
MCH RBC QN AUTO: 29 PG (ref 26–35)
MCH RBC QN AUTO: 29 PG (ref 26–35)
MCHC RBC AUTO-ENTMCNC: 32.3 G/DL (ref 32–34.5)
MCHC RBC AUTO-ENTMCNC: 32.6 G/DL (ref 32–34.5)
MCV RBC AUTO: 89 FL (ref 80–99.9)
MCV RBC AUTO: 89.8 FL (ref 80–99.9)
MONOCYTES NFR BLD: 0.76 K/UL (ref 0.1–0.95)
MONOCYTES NFR BLD: 7 % (ref 2–12)
NEUTROPHILS NFR BLD: 85 % (ref 43–80)
NEUTS SEG NFR BLD: 9.52 K/UL (ref 1.8–7.3)
NUC STRESS EJECTION FRACTION: 83 %
PARTIAL THROMBOPLASTIN TIME: 25.6 SEC (ref 24.5–35.1)
PARTIAL THROMBOPLASTIN TIME: 44 SEC (ref 24.5–35.1)
PARTIAL THROMBOPLASTIN TIME: 58.2 SEC (ref 24.5–35.1)
PARTIAL THROMBOPLASTIN TIME: 80.5 SEC (ref 24.5–35.1)
PLATELET # BLD AUTO: 285 K/UL (ref 130–450)
PLATELET # BLD AUTO: 311 K/UL (ref 130–450)
PMV BLD AUTO: 10.9 FL (ref 7–12)
PMV BLD AUTO: 9.7 FL (ref 7–12)
POTASSIUM SERPL-SCNC: 4.8 MMOL/L (ref 3.5–5.1)
RBC # BLD AUTO: 3.24 M/UL (ref 3.5–5.5)
RBC # BLD AUTO: 3.35 M/UL (ref 3.5–5.5)
SODIUM SERPL-SCNC: 134 MMOL/L (ref 136–145)
STRESS BASELINE DIAS BP: 80 MMHG
STRESS BASELINE HR: 61 BPM
STRESS BASELINE SYS BP: 120 MMHG
STRESS ESTIMATED WORKLOAD: 1 METS
STRESS PEAK DIAS BP: 74 MMHG
STRESS PEAK SYS BP: 126 MMHG
STRESS PERCENT HR ACHIEVED: 62 %
STRESS POST PEAK HR: 82 BPM
STRESS RATE PRESSURE PRODUCT: NORMAL BPM*MMHG
STRESS ST DEPRESSION: 0 MM
STRESS TARGET HR: 133 BPM
TID: 1.54
WBC OTHER # BLD: 11.2 K/UL (ref 4.5–11.5)
WBC OTHER # BLD: 11.2 K/UL (ref 4.5–11.5)

## 2025-06-25 PROCEDURE — 6360000002 HC RX W HCPCS

## 2025-06-25 PROCEDURE — 6370000000 HC RX 637 (ALT 250 FOR IP)

## 2025-06-25 PROCEDURE — 99233 SBSQ HOSP IP/OBS HIGH 50: CPT | Performed by: INTERNAL MEDICINE

## 2025-06-25 PROCEDURE — 93018 CV STRESS TEST I&R ONLY: CPT | Performed by: INTERNAL MEDICINE

## 2025-06-25 PROCEDURE — 71045 X-RAY EXAM CHEST 1 VIEW: CPT

## 2025-06-25 PROCEDURE — 94640 AIRWAY INHALATION TREATMENT: CPT

## 2025-06-25 PROCEDURE — 80048 BASIC METABOLIC PNL TOTAL CA: CPT

## 2025-06-25 PROCEDURE — 3430000000 HC RX DIAGNOSTIC RADIOPHARMACEUTICAL: Performed by: RADIOLOGY

## 2025-06-25 PROCEDURE — 99232 SBSQ HOSP IP/OBS MODERATE 35: CPT | Performed by: STUDENT IN AN ORGANIZED HEALTH CARE EDUCATION/TRAINING PROGRAM

## 2025-06-25 PROCEDURE — A9500 TC99M SESTAMIBI: HCPCS | Performed by: RADIOLOGY

## 2025-06-25 PROCEDURE — 2580000003 HC RX 258

## 2025-06-25 PROCEDURE — 2700000000 HC OXYGEN THERAPY PER DAY

## 2025-06-25 PROCEDURE — 85730 THROMBOPLASTIN TIME PARTIAL: CPT

## 2025-06-25 PROCEDURE — 94669 MECHANICAL CHEST WALL OSCILL: CPT

## 2025-06-25 PROCEDURE — 93016 CV STRESS TEST SUPVJ ONLY: CPT | Performed by: INTERNAL MEDICINE

## 2025-06-25 PROCEDURE — 93017 CV STRESS TEST TRACING ONLY: CPT

## 2025-06-25 PROCEDURE — 78452 HT MUSCLE IMAGE SPECT MULT: CPT | Performed by: INTERNAL MEDICINE

## 2025-06-25 PROCEDURE — 82962 GLUCOSE BLOOD TEST: CPT

## 2025-06-25 PROCEDURE — 2060000000 HC ICU INTERMEDIATE R&B

## 2025-06-25 PROCEDURE — 85025 COMPLETE CBC W/AUTO DIFF WBC: CPT

## 2025-06-25 PROCEDURE — 85027 COMPLETE CBC AUTOMATED: CPT

## 2025-06-25 PROCEDURE — 2500000003 HC RX 250 WO HCPCS

## 2025-06-25 PROCEDURE — 78452 HT MUSCLE IMAGE SPECT MULT: CPT

## 2025-06-25 RX ORDER — GLUCAGON 1 MG/ML
1 KIT INJECTION PRN
Status: DISCONTINUED | OUTPATIENT
Start: 2025-06-25 | End: 2025-06-29

## 2025-06-25 RX ORDER — TETRAKIS(2-METHOXYISOBUTYLISOCYANIDE)COPPER(I) TETRAFLUOROBORATE 1 MG/ML
35 INJECTION, POWDER, LYOPHILIZED, FOR SOLUTION INTRAVENOUS
Status: COMPLETED | OUTPATIENT
Start: 2025-06-25 | End: 2025-06-25

## 2025-06-25 RX ORDER — LIDOCAINE 4 G/G
1 PATCH TOPICAL DAILY
Status: DISCONTINUED | OUTPATIENT
Start: 2025-06-26 | End: 2025-06-25

## 2025-06-25 RX ORDER — HYDRALAZINE HYDROCHLORIDE 20 MG/ML
10 INJECTION INTRAMUSCULAR; INTRAVENOUS ONCE
Status: COMPLETED | OUTPATIENT
Start: 2025-06-25 | End: 2025-06-25

## 2025-06-25 RX ORDER — PROCHLORPERAZINE EDISYLATE 5 MG/ML
10 INJECTION INTRAMUSCULAR; INTRAVENOUS EVERY 6 HOURS PRN
Status: DISCONTINUED | OUTPATIENT
Start: 2025-06-25 | End: 2025-07-01 | Stop reason: HOSPADM

## 2025-06-25 RX ORDER — TETRAKIS(2-METHOXYISOBUTYLISOCYANIDE)COPPER(I) TETRAFLUOROBORATE 1 MG/ML
11.9 INJECTION, POWDER, LYOPHILIZED, FOR SOLUTION INTRAVENOUS
Status: COMPLETED | OUTPATIENT
Start: 2025-06-25 | End: 2025-06-25

## 2025-06-25 RX ORDER — DEXTROSE MONOHYDRATE 100 MG/ML
INJECTION, SOLUTION INTRAVENOUS CONTINUOUS PRN
Status: DISCONTINUED | OUTPATIENT
Start: 2025-06-25 | End: 2025-06-29

## 2025-06-25 RX ORDER — REGADENOSON 0.08 MG/ML
0.4 INJECTION, SOLUTION INTRAVENOUS
Status: COMPLETED | OUTPATIENT
Start: 2025-06-25 | End: 2025-06-25

## 2025-06-25 RX ORDER — LIDOCAINE 4 G/G
1 PATCH TOPICAL DAILY
Status: DISCONTINUED | OUTPATIENT
Start: 2025-06-25 | End: 2025-07-01 | Stop reason: HOSPADM

## 2025-06-25 RX ORDER — HEPARIN SODIUM 10000 [USP'U]/100ML
5-30 INJECTION, SOLUTION INTRAVENOUS CONTINUOUS
Status: DISCONTINUED | OUTPATIENT
Start: 2025-06-25 | End: 2025-06-26

## 2025-06-25 RX ORDER — SODIUM CHLORIDE FOR INHALATION 3 %
4 VIAL, NEBULIZER (ML) INHALATION 2 TIMES DAILY
Status: DISCONTINUED | OUTPATIENT
Start: 2025-06-25 | End: 2025-06-26

## 2025-06-25 RX ADMIN — Medication 34 MILLICURIE: at 13:00

## 2025-06-25 RX ADMIN — Medication 11.9 MILLICURIE: at 10:51

## 2025-06-25 RX ADMIN — HEPARIN SODIUM 12 UNITS/KG/HR: 10000 INJECTION, SOLUTION INTRAVENOUS at 07:34

## 2025-06-25 RX ADMIN — SODIUM CHLORIDE, PRESERVATIVE FREE 10 ML: 5 INJECTION INTRAVENOUS at 08:52

## 2025-06-25 RX ADMIN — Medication 4 ML: at 21:13

## 2025-06-25 RX ADMIN — HEPARIN SODIUM 1800 UNITS: 1000 INJECTION INTRAVENOUS; SUBCUTANEOUS at 10:56

## 2025-06-25 RX ADMIN — SODIUM CHLORIDE, PRESERVATIVE FREE 10 ML: 5 INJECTION INTRAVENOUS at 20:47

## 2025-06-25 RX ADMIN — GUAIFENESIN 400 MG: 400 TABLET ORAL at 20:44

## 2025-06-25 RX ADMIN — METOPROLOL SUCCINATE 50 MG: 50 TABLET, EXTENDED RELEASE ORAL at 08:51

## 2025-06-25 RX ADMIN — ACETAMINOPHEN 650 MG: 325 TABLET ORAL at 09:22

## 2025-06-25 RX ADMIN — AMIODARONE HYDROCHLORIDE 200 MG: 200 TABLET ORAL at 08:51

## 2025-06-25 RX ADMIN — GUAIFENESIN 400 MG: 400 TABLET ORAL at 16:09

## 2025-06-25 RX ADMIN — LEVALBUTEROL 0.63 MG: 0.63 SOLUTION RESPIRATORY (INHALATION) at 21:13

## 2025-06-25 RX ADMIN — IPRATROPIUM BROMIDE AND ALBUTEROL SULFATE 1 DOSE: .5; 3 SOLUTION RESPIRATORY (INHALATION) at 08:00

## 2025-06-25 RX ADMIN — Medication 3 MG: at 23:01

## 2025-06-25 RX ADMIN — ASPIRIN 81 MG CHEWABLE TABLET 81 MG: 81 TABLET CHEWABLE at 08:51

## 2025-06-25 RX ADMIN — AMIODARONE HYDROCHLORIDE 200 MG: 200 TABLET ORAL at 20:44

## 2025-06-25 RX ADMIN — BUDESONIDE 500 MCG: 0.5 SUSPENSION RESPIRATORY (INHALATION) at 08:00

## 2025-06-25 RX ADMIN — ACETAMINOPHEN 650 MG: 325 TABLET ORAL at 20:44

## 2025-06-25 RX ADMIN — GUAIFENESIN 400 MG: 400 TABLET ORAL at 08:51

## 2025-06-25 RX ADMIN — BUDESONIDE 500 MCG: 0.5 SUSPENSION RESPIRATORY (INHALATION) at 21:13

## 2025-06-25 RX ADMIN — HYDRALAZINE HYDROCHLORIDE 10 MG: 20 INJECTION INTRAMUSCULAR; INTRAVENOUS at 21:49

## 2025-06-25 RX ADMIN — REGADENOSON 0.4 MG: 0.08 INJECTION, SOLUTION INTRAVENOUS at 12:59

## 2025-06-25 ASSESSMENT — PAIN DESCRIPTION - ORIENTATION
ORIENTATION: RIGHT
ORIENTATION: RIGHT

## 2025-06-25 ASSESSMENT — PAIN DESCRIPTION - LOCATION
LOCATION: SHOULDER
LOCATION: NECK

## 2025-06-25 ASSESSMENT — PAIN SCALES - GENERAL
PAINLEVEL_OUTOF10: 0
PAINLEVEL_OUTOF10: 5
PAINLEVEL_OUTOF10: 3

## 2025-06-25 ASSESSMENT — PAIN DESCRIPTION - DESCRIPTORS
DESCRIPTORS: SORE;ACHING;DISCOMFORT
DESCRIPTORS: ACHING;CRAMPING;DISCOMFORT

## 2025-06-25 ASSESSMENT — PAIN - FUNCTIONAL ASSESSMENT: PAIN_FUNCTIONAL_ASSESSMENT: ACTIVITIES ARE NOT PREVENTED

## 2025-06-25 NOTE — CARE COORDINATION
Transition of Care: Met with patient at bedside and explained case management role. Patient lives with Hill (son) [place]. Ambulates with a cane at baseline. No Hx SNF, HHC, or DME. Patient primary care physician is Becca Amador MD. [facility]  Primary decision maker is Hill (son). Discharge plan is SNF vs Home. Hill prefers SNF. List emailed to Hill @ [email or web address]. Patient came to hospital for elective EBUS. Post procedure patient went into afib rvr. Transferred to MICU. CM/SW to follow. MT    Case Management Assessment  Initial Evaluation    Date/Time of Evaluation: 6/25/2025 3:24 PM  Assessment Completed by: Alverto Cerda RN    If patient is discharged prior to next notation, then this note serves as note for discharge by case management.    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 Yes    Chart Reviewed: Yes      History Provided by: Patient  Patient Orientation: Alert and Oriented    Patient Cognition: Alert    Hospitalization in the last 30 days (Readmission):  No    If yes, Readmission Assessment in  Navigator will be completed.    Advance Directives:      Code Status: Full Code   Patient's Primary Decision Maker is: Legal Next of Kin      Discharge Planning:    Patient lives with: Children Type of Home: House  Primary Care Giver: Family  Patient Support Systems include: Family Members   Current Financial resources:    Current community resources:    Current services prior to admission: None            Current DME:              Type of Home Care services:  None    ADLS  Prior

## 2025-06-25 NOTE — PROCEDURES
PROCEDURE NOTE  Date: 6/25/2025   Name: Terra Mahan  YOB: 1935    Procedures:    Lexiscan Nuclear Stress Test:    Cardiologist: Dr. Estefani Foley    Baseline EKG: Normal sinus rhythm, short AZ interval, otherwise normal EKG.    Indications for study: Chest pain    1. No chest pain  2. No new arrhythmias  3. No EKG changes suggestive of stress induced ischemia  4. Nuclear images pending    Estefani Foley MD., FACC.   [facility]

## 2025-06-26 ENCOUNTER — APPOINTMENT (OUTPATIENT)
Dept: GENERAL RADIOLOGY | Age: 89
DRG: 280 | End: 2025-06-26
Attending: INTERNAL MEDICINE
Payer: MEDICARE

## 2025-06-26 LAB
ANION GAP SERPL CALCULATED.3IONS-SCNC: 11 MMOL/L (ref 7–16)
BASOPHILS # BLD: 0 K/UL (ref 0–0.2)
BASOPHILS NFR BLD: 0 % (ref 0–2)
BUN SERPL-MCNC: 11 MG/DL (ref 8–23)
CALCIUM SERPL-MCNC: 9.9 MG/DL (ref 8.8–10.2)
CHLORIDE SERPL-SCNC: 99 MMOL/L (ref 98–107)
CO2 SERPL-SCNC: 26 MMOL/L (ref 22–29)
CREAT SERPL-MCNC: 0.7 MG/DL (ref 0.5–1)
EOSINOPHIL # BLD: 0 K/UL (ref 0.05–0.5)
EOSINOPHILS RELATIVE PERCENT: 0 % (ref 0–6)
ERYTHROCYTE [DISTWIDTH] IN BLOOD BY AUTOMATED COUNT: 13.5 % (ref 11.5–15)
GFR, ESTIMATED: 79 ML/MIN/1.73M2
GLUCOSE BLD-MCNC: 113 MG/DL (ref 74–99)
GLUCOSE BLD-MCNC: 116 MG/DL (ref 74–99)
GLUCOSE BLD-MCNC: 144 MG/DL (ref 74–99)
GLUCOSE BLD-MCNC: 146 MG/DL (ref 74–99)
GLUCOSE SERPL-MCNC: 110 MG/DL (ref 74–99)
HCT VFR BLD AUTO: 29.8 % (ref 34–48)
HGB BLD-MCNC: 9.9 G/DL (ref 11.5–15.5)
LYMPHOCYTES NFR BLD: 0.3 K/UL (ref 1.5–4)
LYMPHOCYTES RELATIVE PERCENT: 3 % (ref 20–42)
MAGNESIUM SERPL-MCNC: 1.5 MG/DL (ref 1.6–2.4)
MCH RBC QN AUTO: 28.9 PG (ref 26–35)
MCHC RBC AUTO-ENTMCNC: 33.2 G/DL (ref 32–34.5)
MCV RBC AUTO: 87.1 FL (ref 80–99.9)
MONOCYTES NFR BLD: 0.61 K/UL (ref 0.1–0.95)
MONOCYTES NFR BLD: 5 % (ref 2–12)
NEUTROPHILS NFR BLD: 92 % (ref 43–80)
NEUTS SEG NFR BLD: 10.69 K/UL (ref 1.8–7.3)
NON-GYN CYTOLOGY REPORT: NORMAL
PARTIAL THROMBOPLASTIN TIME: 47.4 SEC (ref 24.5–35.1)
PLATELET # BLD AUTO: 318 K/UL (ref 130–450)
PMV BLD AUTO: 10.2 FL (ref 7–12)
POTASSIUM SERPL-SCNC: 3.2 MMOL/L (ref 3.5–5.1)
RBC # BLD AUTO: 3.42 M/UL (ref 3.5–5.5)
RBC # BLD: NORMAL 10*6/UL
SODIUM SERPL-SCNC: 135 MMOL/L (ref 136–145)
WBC OTHER # BLD: 11.6 K/UL (ref 4.5–11.5)

## 2025-06-26 PROCEDURE — 85025 COMPLETE CBC W/AUTO DIFF WBC: CPT

## 2025-06-26 PROCEDURE — 71045 X-RAY EXAM CHEST 1 VIEW: CPT

## 2025-06-26 PROCEDURE — 82962 GLUCOSE BLOOD TEST: CPT

## 2025-06-26 PROCEDURE — 2500000003 HC RX 250 WO HCPCS: Performed by: INTERNAL MEDICINE

## 2025-06-26 PROCEDURE — 6360000002 HC RX W HCPCS

## 2025-06-26 PROCEDURE — 99232 SBSQ HOSP IP/OBS MODERATE 35: CPT | Performed by: STUDENT IN AN ORGANIZED HEALTH CARE EDUCATION/TRAINING PROGRAM

## 2025-06-26 PROCEDURE — 5A0945A ASSISTANCE WITH RESPIRATORY VENTILATION, 24-96 CONSECUTIVE HOURS, HIGH NASAL FLOW/VELOCITY: ICD-10-PCS | Performed by: STUDENT IN AN ORGANIZED HEALTH CARE EDUCATION/TRAINING PROGRAM

## 2025-06-26 PROCEDURE — 80048 BASIC METABOLIC PNL TOTAL CA: CPT

## 2025-06-26 PROCEDURE — 6360000002 HC RX W HCPCS: Performed by: INTERNAL MEDICINE

## 2025-06-26 PROCEDURE — 2500000003 HC RX 250 WO HCPCS

## 2025-06-26 PROCEDURE — 2580000003 HC RX 258

## 2025-06-26 PROCEDURE — 6370000000 HC RX 637 (ALT 250 FOR IP)

## 2025-06-26 PROCEDURE — 83735 ASSAY OF MAGNESIUM: CPT

## 2025-06-26 PROCEDURE — 85730 THROMBOPLASTIN TIME PARTIAL: CPT

## 2025-06-26 PROCEDURE — 2060000000 HC ICU INTERMEDIATE R&B

## 2025-06-26 PROCEDURE — 2700000000 HC OXYGEN THERAPY PER DAY

## 2025-06-26 PROCEDURE — 94640 AIRWAY INHALATION TREATMENT: CPT

## 2025-06-26 PROCEDURE — 0B938ZZ DRAINAGE OF RIGHT MAIN BRONCHUS, VIA NATURAL OR ARTIFICIAL OPENING ENDOSCOPIC: ICD-10-PCS | Performed by: INTERNAL MEDICINE

## 2025-06-26 PROCEDURE — 94667 MNPJ CHEST WALL 1ST: CPT

## 2025-06-26 PROCEDURE — 2580000003 HC RX 258: Performed by: INTERNAL MEDICINE

## 2025-06-26 RX ORDER — SENNOSIDES 8.6 MG/1
1 TABLET ORAL NIGHTLY
Status: DISCONTINUED | OUTPATIENT
Start: 2025-06-26 | End: 2025-06-27

## 2025-06-26 RX ORDER — ACETYLCYSTEINE 200 MG/ML
600 SOLUTION ORAL; RESPIRATORY (INHALATION)
Status: DISCONTINUED | OUTPATIENT
Start: 2025-06-26 | End: 2025-06-28

## 2025-06-26 RX ORDER — LEVALBUTEROL INHALATION SOLUTION 0.63 MG/3ML
0.63 SOLUTION RESPIRATORY (INHALATION) EVERY 4 HOURS
Status: DISCONTINUED | OUTPATIENT
Start: 2025-06-26 | End: 2025-07-01 | Stop reason: HOSPADM

## 2025-06-26 RX ORDER — ACETYLCYSTEINE 200 MG/ML
600 SOLUTION ORAL; RESPIRATORY (INHALATION)
Status: DISCONTINUED | OUTPATIENT
Start: 2025-06-26 | End: 2025-06-26

## 2025-06-26 RX ORDER — POTASSIUM CHLORIDE 7.45 MG/ML
10 INJECTION INTRAVENOUS
Status: COMPLETED | OUTPATIENT
Start: 2025-06-26 | End: 2025-06-26

## 2025-06-26 RX ORDER — TRANEXAMIC ACID 100 MG/ML
500 INJECTION, SOLUTION INTRAVENOUS 2 TIMES DAILY
Status: COMPLETED | OUTPATIENT
Start: 2025-06-26 | End: 2025-06-28

## 2025-06-26 RX ORDER — MIDAZOLAM HYDROCHLORIDE 2 MG/2ML
2 INJECTION, SOLUTION INTRAMUSCULAR; INTRAVENOUS ONCE
Status: COMPLETED | OUTPATIENT
Start: 2025-06-26 | End: 2025-06-26

## 2025-06-26 RX ORDER — MAGNESIUM SULFATE IN WATER 40 MG/ML
2000 INJECTION, SOLUTION INTRAVENOUS ONCE
Status: COMPLETED | OUTPATIENT
Start: 2025-06-26 | End: 2025-06-26

## 2025-06-26 RX ORDER — SODIUM CHLORIDE FOR INHALATION 3 %
4 VIAL, NEBULIZER (ML) INHALATION EVERY 4 HOURS
Status: DISCONTINUED | OUTPATIENT
Start: 2025-06-26 | End: 2025-06-26

## 2025-06-26 RX ORDER — MIDAZOLAM HYDROCHLORIDE 2 MG/2ML
1 INJECTION, SOLUTION INTRAMUSCULAR; INTRAVENOUS ONCE
Status: DISCONTINUED | OUTPATIENT
Start: 2025-06-26 | End: 2025-06-26

## 2025-06-26 RX ORDER — HYDRALAZINE HYDROCHLORIDE 20 MG/ML
10 INJECTION INTRAMUSCULAR; INTRAVENOUS ONCE
Status: COMPLETED | OUTPATIENT
Start: 2025-06-26 | End: 2025-06-26

## 2025-06-26 RX ORDER — FENTANYL CITRATE 50 UG/ML
100 INJECTION, SOLUTION INTRAMUSCULAR; INTRAVENOUS ONCE
Status: COMPLETED | OUTPATIENT
Start: 2025-06-26 | End: 2025-06-26

## 2025-06-26 RX ADMIN — ACETYLCYSTEINE 600 MG: 200 INHALANT RESPIRATORY (INHALATION) at 19:19

## 2025-06-26 RX ADMIN — MIDAZOLAM HYDROCHLORIDE 2 MG: 1 INJECTION, SOLUTION INTRAMUSCULAR; INTRAVENOUS at 16:23

## 2025-06-26 RX ADMIN — GUAIFENESIN 400 MG: 400 TABLET ORAL at 14:33

## 2025-06-26 RX ADMIN — SODIUM CHLORIDE, PRESERVATIVE FREE 10 ML: 5 INJECTION INTRAVENOUS at 20:49

## 2025-06-26 RX ADMIN — MAGNESIUM SULFATE HEPTAHYDRATE 2000 MG: 40 INJECTION, SOLUTION INTRAVENOUS at 06:26

## 2025-06-26 RX ADMIN — Medication 4 ML: at 07:42

## 2025-06-26 RX ADMIN — POTASSIUM CHLORIDE 10 MEQ: 7.46 INJECTION, SOLUTION INTRAVENOUS at 07:46

## 2025-06-26 RX ADMIN — ROSUVASTATIN CALCIUM 5 MG: 5 TABLET, FILM COATED ORAL at 20:48

## 2025-06-26 RX ADMIN — AMLODIPINE BESYLATE 5 MG: 5 TABLET ORAL at 09:24

## 2025-06-26 RX ADMIN — HYDRALAZINE HYDROCHLORIDE 10 MG: 20 INJECTION INTRAMUSCULAR; INTRAVENOUS at 09:24

## 2025-06-26 RX ADMIN — SENNOSIDES 8.6 MG: 8.6 TABLET, FILM COATED ORAL at 20:49

## 2025-06-26 RX ADMIN — POTASSIUM CHLORIDE 10 MEQ: 7.46 INJECTION, SOLUTION INTRAVENOUS at 09:25

## 2025-06-26 RX ADMIN — METHYLPREDNISOLONE SODIUM SUCCINATE 40 MG: 40 INJECTION, POWDER, LYOPHILIZED, FOR SOLUTION INTRAMUSCULAR; INTRAVENOUS at 21:31

## 2025-06-26 RX ADMIN — LEVALBUTEROL 0.63 MG: 0.63 SOLUTION RESPIRATORY (INHALATION) at 11:03

## 2025-06-26 RX ADMIN — POTASSIUM CHLORIDE 10 MEQ: 7.46 INJECTION, SOLUTION INTRAVENOUS at 06:23

## 2025-06-26 RX ADMIN — METHYLPREDNISOLONE SODIUM SUCCINATE 40 MG: 40 INJECTION, POWDER, LYOPHILIZED, FOR SOLUTION INTRAMUSCULAR; INTRAVENOUS at 16:33

## 2025-06-26 RX ADMIN — AMIODARONE HYDROCHLORIDE 200 MG: 200 TABLET ORAL at 20:49

## 2025-06-26 RX ADMIN — GUAIFENESIN 400 MG: 400 TABLET ORAL at 09:24

## 2025-06-26 RX ADMIN — HYDRALAZINE HYDROCHLORIDE 10 MG: 20 INJECTION INTRAMUSCULAR; INTRAVENOUS at 04:51

## 2025-06-26 RX ADMIN — BUDESONIDE 500 MCG: 0.5 SUSPENSION RESPIRATORY (INHALATION) at 07:42

## 2025-06-26 RX ADMIN — BUDESONIDE 500 MCG: 0.5 SUSPENSION RESPIRATORY (INHALATION) at 19:20

## 2025-06-26 RX ADMIN — SODIUM CHLORIDE, PRESERVATIVE FREE 10 ML: 5 INJECTION INTRAVENOUS at 09:25

## 2025-06-26 RX ADMIN — DORNASE ALFA 2.5 MG: 1 SOLUTION RESPIRATORY (INHALATION) at 11:28

## 2025-06-26 RX ADMIN — LEVALBUTEROL 0.63 MG: 0.63 SOLUTION RESPIRATORY (INHALATION) at 19:19

## 2025-06-26 RX ADMIN — ACETAMINOPHEN 650 MG: 325 TABLET ORAL at 09:57

## 2025-06-26 RX ADMIN — HEPARIN SODIUM 1800 UNITS: 1000 INJECTION INTRAVENOUS; SUBCUTANEOUS at 05:51

## 2025-06-26 RX ADMIN — AMIODARONE HYDROCHLORIDE 200 MG: 200 TABLET ORAL at 09:24

## 2025-06-26 RX ADMIN — Medication 4 ML: at 11:02

## 2025-06-26 RX ADMIN — LEVALBUTEROL 0.63 MG: 0.63 SOLUTION RESPIRATORY (INHALATION) at 17:12

## 2025-06-26 RX ADMIN — Medication 4 ML: at 17:12

## 2025-06-26 RX ADMIN — FENTANYL CITRATE 100 MCG: 50 INJECTION INTRAMUSCULAR; INTRAVENOUS at 16:22

## 2025-06-26 RX ADMIN — ASPIRIN 81 MG CHEWABLE TABLET 81 MG: 81 TABLET CHEWABLE at 09:24

## 2025-06-26 RX ADMIN — TRANEXAMIC ACID 500 MG: 100 INJECTION, SOLUTION INTRAVENOUS at 16:59

## 2025-06-26 RX ADMIN — GUAIFENESIN 400 MG: 400 TABLET ORAL at 20:49

## 2025-06-26 RX ADMIN — METOPROLOL SUCCINATE 50 MG: 50 TABLET, EXTENDED RELEASE ORAL at 09:24

## 2025-06-26 NOTE — CARE COORDINATION
CM Update: Met with patient at bedside to discuss transition of care plan. Patient [place] Discharge plan is SNF. List emailed to Hill @ [email or web address] on 06/25. Left voicemail with Hill requesting that he respond to the email sent yesterday. Patient came to hospital for elective EBUS. Post procedure patient went into afib rvr. Transferred to MICU. CM/JOSSY to follow. Alverto Cerda [phone]

## 2025-06-26 NOTE — PROCEDURES
Bronchoscopy Inpatient Procedure Note    Date of Procedure: 6/26/2025    Pre-op Diagnosis: Complete whiteout of the right lung    Post-op Diagnosis: Same    Bronchoscopist: Jennifer Miller MD      Anesthesia: Conscious Sedation. Total Dose: Versed -2 mg IV Fentanyl -100 mcg    Procedure: Flexible fiberoptic bronchoscopy    Estimated Blood Loss: Minimal    Complications: None    Indications and History    (Please see today's progress notes for the latest issues,  physical exam and lab data)    Consent to Procedure  The risks, benefits, complications, treatment options and expected outcomes were discussed with the patient and/or POA  The possibilities of reaction to medication, pulmonary aspiration, perforation of a viscus, bleeding, failure to diagnose a condition and creating a complication requiring transfusion or operation were discussed with the patient who freely signed the consent.      Description of Procedure   Terra was monitored by the Critical Nursing and Respiratory therapy staff and the standard ICU monitoring devices. Terra Mahan and the procedure were verified as Flexible Fiberoptic Bronchoscopy.  A Time Out was held and the above information confirmed.     The patient was placed in the appropriate position. The patient was sedated using midazolam (Versed) intravenously and fentanyl intravenously    The bronchoscope was then passed into the trachea via the oropharynx.. After careful inspection of the tracheal, the bronchoscope was sequentially passed into all segments of the left and right endobronchial trees to the second and/or third divisions.    Endobronchial findings    Vocal Cords Normal  Trachea: No tracheal stenosis. Edematous mucosa  Catrina  Edematous mucosa and Blunted    Right Main Stem Bronchus  Collapse with mucus plugging, which was cleared with normal saline washings.  Right Upper Lobe Bronchi Collapse with mucus plugging  Right Middle Lobe Bronchi  Collapse with mucus  Dylan You(Attending)

## 2025-06-27 ENCOUNTER — HOSPITAL ENCOUNTER (OUTPATIENT)
Dept: RADIATION ONCOLOGY | Age: 89
Discharge: HOME | End: 2025-06-27

## 2025-06-27 ENCOUNTER — APPOINTMENT (OUTPATIENT)
Dept: GENERAL RADIOLOGY | Age: 89
DRG: 280 | End: 2025-06-27
Attending: INTERNAL MEDICINE
Payer: MEDICARE

## 2025-06-27 DIAGNOSIS — C79.51 SECONDARY MALIGNANT NEOPLASM OF BONE (HCC): Primary | ICD-10-CM

## 2025-06-27 LAB
ANION GAP SERPL CALCULATED.3IONS-SCNC: 18 MMOL/L (ref 7–16)
BASOPHILS # BLD: 0.01 K/UL (ref 0–0.2)
BASOPHILS NFR BLD: 0 % (ref 0–2)
BUN SERPL-MCNC: 16 MG/DL (ref 8–23)
CALCIUM SERPL-MCNC: 10 MG/DL (ref 8.8–10.2)
CHLORIDE SERPL-SCNC: 92 MMOL/L (ref 98–107)
CO2 SERPL-SCNC: 20 MMOL/L (ref 22–29)
CREAT SERPL-MCNC: 0.7 MG/DL (ref 0.5–1)
EOSINOPHIL # BLD: 0 K/UL (ref 0.05–0.5)
EOSINOPHILS RELATIVE PERCENT: 0 % (ref 0–6)
ERYTHROCYTE [DISTWIDTH] IN BLOOD BY AUTOMATED COUNT: 13.5 % (ref 11.5–15)
GFR, ESTIMATED: 79 ML/MIN/1.73M2
GLUCOSE BLD-MCNC: 144 MG/DL (ref 74–99)
GLUCOSE BLD-MCNC: 171 MG/DL (ref 74–99)
GLUCOSE BLD-MCNC: 181 MG/DL (ref 74–99)
GLUCOSE BLD-MCNC: 186 MG/DL (ref 74–99)
GLUCOSE SERPL-MCNC: 134 MG/DL (ref 74–99)
HCT VFR BLD AUTO: 32.8 % (ref 34–48)
HGB BLD-MCNC: 10.9 G/DL (ref 11.5–15.5)
IMM GRANULOCYTES # BLD AUTO: 0.09 K/UL (ref 0–0.58)
IMM GRANULOCYTES NFR BLD: 1 % (ref 0–5)
LYMPHOCYTES NFR BLD: 0.3 K/UL (ref 1.5–4)
LYMPHOCYTES RELATIVE PERCENT: 2 % (ref 20–42)
MAGNESIUM SERPL-MCNC: 1.7 MG/DL (ref 1.6–2.4)
MCH RBC QN AUTO: 28.8 PG (ref 26–35)
MCHC RBC AUTO-ENTMCNC: 33.2 G/DL (ref 32–34.5)
MCV RBC AUTO: 86.8 FL (ref 80–99.9)
MONOCYTES NFR BLD: 0.16 K/UL (ref 0.1–0.95)
MONOCYTES NFR BLD: 1 % (ref 2–12)
NEUTROPHILS NFR BLD: 96 % (ref 43–80)
NEUTS SEG NFR BLD: 14.13 K/UL (ref 1.8–7.3)
OSMOLALITY UR: 581 MOSM/KG (ref 300–900)
PHOSPHATE SERPL-MCNC: 2.3 MG/DL (ref 2.5–4.5)
PLATELET # BLD AUTO: 346 K/UL (ref 130–450)
PMV BLD AUTO: 9.5 FL (ref 7–12)
POTASSIUM SERPL-SCNC: 4.9 MMOL/L (ref 3.5–5.1)
PROCALCITONIN SERPL-MCNC: 0.13 NG/ML (ref 0–0.08)
RBC # BLD AUTO: 3.78 M/UL (ref 3.5–5.5)
RBC # BLD: ABNORMAL 10*6/UL
SODIUM SERPL-SCNC: 130 MMOL/L (ref 136–145)
SODIUM UR-SCNC: 103 MMOL/L
SURGICAL PATHOLOGY REPORT: NORMAL
WBC OTHER # BLD: 14.7 K/UL (ref 4.5–11.5)

## 2025-06-27 PROCEDURE — 6360000002 HC RX W HCPCS

## 2025-06-27 PROCEDURE — 2500000003 HC RX 250 WO HCPCS

## 2025-06-27 PROCEDURE — 97535 SELF CARE MNGMENT TRAINING: CPT

## 2025-06-27 PROCEDURE — 85025 COMPLETE CBC W/AUTO DIFF WBC: CPT

## 2025-06-27 PROCEDURE — 94668 MNPJ CHEST WALL SBSQ: CPT

## 2025-06-27 PROCEDURE — 83735 ASSAY OF MAGNESIUM: CPT

## 2025-06-27 PROCEDURE — 94669 MECHANICAL CHEST WALL OSCILL: CPT

## 2025-06-27 PROCEDURE — 99024 POSTOP FOLLOW-UP VISIT: CPT | Performed by: RADIOLOGY

## 2025-06-27 PROCEDURE — 2580000003 HC RX 258

## 2025-06-27 PROCEDURE — 80048 BASIC METABOLIC PNL TOTAL CA: CPT

## 2025-06-27 PROCEDURE — 87077 CULTURE AEROBIC IDENTIFY: CPT

## 2025-06-27 PROCEDURE — 97530 THERAPEUTIC ACTIVITIES: CPT

## 2025-06-27 PROCEDURE — 87205 SMEAR GRAM STAIN: CPT

## 2025-06-27 PROCEDURE — 2500000003 HC RX 250 WO HCPCS: Performed by: INTERNAL MEDICINE

## 2025-06-27 PROCEDURE — 6370000000 HC RX 637 (ALT 250 FOR IP)

## 2025-06-27 PROCEDURE — 2700000000 HC OXYGEN THERAPY PER DAY

## 2025-06-27 PROCEDURE — 82962 GLUCOSE BLOOD TEST: CPT

## 2025-06-27 PROCEDURE — 99232 SBSQ HOSP IP/OBS MODERATE 35: CPT | Performed by: STUDENT IN AN ORGANIZED HEALTH CARE EDUCATION/TRAINING PROGRAM

## 2025-06-27 PROCEDURE — 83935 ASSAY OF URINE OSMOLALITY: CPT

## 2025-06-27 PROCEDURE — 94640 AIRWAY INHALATION TREATMENT: CPT

## 2025-06-27 PROCEDURE — 84100 ASSAY OF PHOSPHORUS: CPT

## 2025-06-27 PROCEDURE — 6360000002 HC RX W HCPCS: Performed by: INTERNAL MEDICINE

## 2025-06-27 PROCEDURE — 97166 OT EVAL MOD COMPLEX 45 MIN: CPT

## 2025-06-27 PROCEDURE — 2060000000 HC ICU INTERMEDIATE R&B

## 2025-06-27 PROCEDURE — 84300 ASSAY OF URINE SODIUM: CPT

## 2025-06-27 PROCEDURE — 36415 COLL VENOUS BLD VENIPUNCTURE: CPT

## 2025-06-27 PROCEDURE — 87070 CULTURE OTHR SPECIMN AEROBIC: CPT

## 2025-06-27 PROCEDURE — 84145 PROCALCITONIN (PCT): CPT

## 2025-06-27 PROCEDURE — 6370000000 HC RX 637 (ALT 250 FOR IP): Performed by: INTERNAL MEDICINE

## 2025-06-27 PROCEDURE — 97162 PT EVAL MOD COMPLEX 30 MIN: CPT

## 2025-06-27 PROCEDURE — 71045 X-RAY EXAM CHEST 1 VIEW: CPT

## 2025-06-27 RX ORDER — SENNOSIDES 8.6 MG/1
2 TABLET ORAL NIGHTLY
Status: DISCONTINUED | OUTPATIENT
Start: 2025-06-27 | End: 2025-07-01 | Stop reason: HOSPADM

## 2025-06-27 RX ORDER — MAGNESIUM SULFATE IN WATER 40 MG/ML
2000 INJECTION, SOLUTION INTRAVENOUS ONCE
Status: COMPLETED | OUTPATIENT
Start: 2025-06-27 | End: 2025-06-27

## 2025-06-27 RX ADMIN — METOPROLOL SUCCINATE 50 MG: 50 TABLET, EXTENDED RELEASE ORAL at 07:34

## 2025-06-27 RX ADMIN — SODIUM PHOSPHATE, MONOBASIC, MONOHYDRATE AND SODIUM PHOSPHATE, DIBASIC, ANHYDROUS 20 MMOL: 142; 276 INJECTION, SOLUTION INTRAVENOUS at 13:57

## 2025-06-27 RX ADMIN — METHYLPREDNISOLONE SODIUM SUCCINATE 40 MG: 40 INJECTION, POWDER, LYOPHILIZED, FOR SOLUTION INTRAMUSCULAR; INTRAVENOUS at 04:37

## 2025-06-27 RX ADMIN — APIXABAN 2.5 MG: 2.5 TABLET, FILM COATED ORAL at 09:33

## 2025-06-27 RX ADMIN — LEVALBUTEROL 0.63 MG: 0.63 SOLUTION RESPIRATORY (INHALATION) at 00:24

## 2025-06-27 RX ADMIN — LEVALBUTEROL 0.63 MG: 0.63 SOLUTION RESPIRATORY (INHALATION) at 19:31

## 2025-06-27 RX ADMIN — ACETYLCYSTEINE 600 MG: 200 INHALANT RESPIRATORY (INHALATION) at 19:34

## 2025-06-27 RX ADMIN — INSULIN LISPRO 2 UNITS: 100 INJECTION, SOLUTION INTRAVENOUS; SUBCUTANEOUS at 17:07

## 2025-06-27 RX ADMIN — LEVALBUTEROL 0.63 MG: 0.63 SOLUTION RESPIRATORY (INHALATION) at 04:45

## 2025-06-27 RX ADMIN — TRANEXAMIC ACID 500 MG: 100 INJECTION, SOLUTION INTRAVENOUS at 16:17

## 2025-06-27 RX ADMIN — LEVALBUTEROL 0.63 MG: 0.63 SOLUTION RESPIRATORY (INHALATION) at 16:35

## 2025-06-27 RX ADMIN — DORNASE ALFA 2.5 MG: 1 SOLUTION RESPIRATORY (INHALATION) at 16:35

## 2025-06-27 RX ADMIN — ASPIRIN 81 MG CHEWABLE TABLET 81 MG: 81 TABLET CHEWABLE at 07:34

## 2025-06-27 RX ADMIN — GUAIFENESIN 400 MG: 400 TABLET ORAL at 21:21

## 2025-06-27 RX ADMIN — ACETYLCYSTEINE 600 MG: 200 INHALANT RESPIRATORY (INHALATION) at 07:54

## 2025-06-27 RX ADMIN — METHYLPREDNISOLONE SODIUM SUCCINATE 40 MG: 40 INJECTION, POWDER, LYOPHILIZED, FOR SOLUTION INTRAMUSCULAR; INTRAVENOUS at 09:33

## 2025-06-27 RX ADMIN — GUAIFENESIN 400 MG: 400 TABLET ORAL at 15:30

## 2025-06-27 RX ADMIN — APIXABAN 2.5 MG: 2.5 TABLET, FILM COATED ORAL at 21:21

## 2025-06-27 RX ADMIN — BUDESONIDE 500 MCG: 0.5 SUSPENSION RESPIRATORY (INHALATION) at 07:54

## 2025-06-27 RX ADMIN — METHYLPREDNISOLONE SODIUM SUCCINATE 40 MG: 40 INJECTION, POWDER, LYOPHILIZED, FOR SOLUTION INTRAMUSCULAR; INTRAVENOUS at 23:11

## 2025-06-27 RX ADMIN — SODIUM CHLORIDE, PRESERVATIVE FREE 10 ML: 5 INJECTION INTRAVENOUS at 07:34

## 2025-06-27 RX ADMIN — AMLODIPINE BESYLATE 5 MG: 5 TABLET ORAL at 07:34

## 2025-06-27 RX ADMIN — INSULIN LISPRO 2 UNITS: 100 INJECTION, SOLUTION INTRAVENOUS; SUBCUTANEOUS at 21:56

## 2025-06-27 RX ADMIN — AMIODARONE HYDROCHLORIDE 200 MG: 200 TABLET ORAL at 21:21

## 2025-06-27 RX ADMIN — MAGNESIUM SULFATE HEPTAHYDRATE 2000 MG: 40 INJECTION, SOLUTION INTRAVENOUS at 13:50

## 2025-06-27 RX ADMIN — LEVALBUTEROL 0.63 MG: 0.63 SOLUTION RESPIRATORY (INHALATION) at 07:54

## 2025-06-27 RX ADMIN — GUAIFENESIN 400 MG: 400 TABLET ORAL at 07:34

## 2025-06-27 RX ADMIN — AMIODARONE HYDROCHLORIDE 200 MG: 200 TABLET ORAL at 07:34

## 2025-06-27 RX ADMIN — BUDESONIDE 500 MCG: 0.5 SUSPENSION RESPIRATORY (INHALATION) at 19:34

## 2025-06-27 NOTE — PROGRESS NOTES
Radiation Oncology Addendum:  Cytology report just released:    [facility] SURGICAL PATHOLOGY [facility]  Order: [id number]  Component 6/23/25 0000   [facility] SURGICAL PATHOLOGY [facility] (NOTE)  Path Number: [id number]    INTERPRETATION    EBUS FINE NEEDLE ASPIRATE, SUBCARINA:  Satisfactory for evaluation.  POSITIVE FOR MALIGNANCY.  Non-small cell carcinoma.  Cellblock is non-contributory.    EBUS FINE NEEDLE ASPIRATE, RIGHT HILUM:  Satisfactory for evaluation.  INCONCLUSIVE FOR MALIGNANT CELLS.  Atypical cells present  Cellblock is non-contributory.    BRONCHIAL BRUSHING, RUL:  Satisfactory for evaluation.  INCONCLUSIVE FOR MALIGNANT CELLS.  Atypical cells present    BRONCHIAL WASHINGS, RUL:  Satisfactory for evaluation.  INCONCLUSIVE FOR MALIGNANT CELLS.  Atypical cells present    Comment:  PD-L1 and/or EGFR, ALK, ROS1, BRAF V600E, KRAS, NTRK1/2/3, METex14  skipping, and RET testing will be deferred to the corresponding  biopsy.  Please refer to corresponding Surgical Pathology report  [id number].  Intradepartmental consultation obtained.        Cytotech Screener:  CCK  **Electronically Signed Out**        Dallas Kee,               Cytology POSITIVE FOR NON SMALL CELL    Patient has an appointment to start radiation treatment at [facility] on Monday, June 30 at 10 AM .    We will check on the patient on Monday.  If she still here at [facility] then we will plan on starting her at [facility]    Manuel Whelan  Radiation Oncology

## 2025-06-27 NOTE — PROGRESS NOTES
Radiation Oncology note:    Patient known to our department.    She was seen in consultation on May 21 2025.    Attached is his consultation note:    Terra Mahan  89 y.o.   1935     REFERRING PROVIDER: Rene    to her clavicular mass  PCP:  Becca Amador MD     CHIEF COMPLAINT: Right clavicular pain     DIAGNOSIS: Likely metastatic bronchogenic carcinoma, awaiting tissue diagnosis     STAGING:  Cancer Staging   No matching staging information was found for the patient.        HISTORY OF PRESENT ILLNESS: Ms. Terra Mahan  is a 89 y.o. year old female with a several month history of progressive right collarbone pain that is worst when she moves her right upper extremity she also feels the pain in the back of her shoulder.  She has lost 30 pounds and lately has had a poor appetite     Imaging included a CT of the chest and abdomen that showed a 7 cm mass in the superior segment of the right lower lobe with mediastinal and hilar adenopathy as well as supraclavicular adenopathy and apparent bony involvement of the right clavicle     A PET scan showed high FDG avidity in all of these areas including in the right upper lobe as well     Head CT was unremarkable     She meets a pulmonologist next week with plans for either bronchoscopy and or CT-guided biopsy     She has met with medical oncology     She is here today for discussion of palliative radiation therapy for her right collarbone pain     She has a mild cough and occasional shortness of breath, no dysphagia, odynophagia, hemoptysis hoarseness or other symptoms     PAST MEDICAL HISTORY:  Past Medical History            Diagnosis Date    Arthritis       arms,shoulders,hips    History of PSVT (paroxysmal supraventricular tachycardia) 2009     no episodes since    [place] (hard of hearing)      Hyperlipidemia      Hypertension      Normal nuclear stress test 1998 ?     jacero    Thyroid disease       as teenager    Vertigo              PAST SURGICAL

## 2025-06-27 NOTE — CARE COORDINATION
CM Update: Met with patient at bedside to discuss transition of care plan. Discharge plan is SNF. Hill mireles 1. [facility], 2. [facility] [place], 3. [facility]. Referrals sent through [facility]. Awaiting responses. Patient came to hospital for elective EBUS. Post procedure patient went into afib rvr. Transferred to MICU. CM/SW to follow. Alverto Cerda [phone]

## 2025-06-28 ENCOUNTER — APPOINTMENT (OUTPATIENT)
Dept: GENERAL RADIOLOGY | Age: 89
DRG: 280 | End: 2025-06-28
Attending: INTERNAL MEDICINE
Payer: MEDICARE

## 2025-06-28 PROBLEM — Z51.5 PALLIATIVE CARE ENCOUNTER: Status: ACTIVE | Noted: 2025-06-28

## 2025-06-28 PROBLEM — Z71.89 GOALS OF CARE, COUNSELING/DISCUSSION: Status: ACTIVE | Noted: 2025-06-28

## 2025-06-28 LAB
ANION GAP SERPL CALCULATED.3IONS-SCNC: 18 MMOL/L (ref 7–16)
B PARAP IS1001 DNA NPH QL NAA+NON-PROBE: NOT DETECTED
B PERT DNA SPEC QL NAA+PROBE: NOT DETECTED
BASOPHILS # BLD: 0.03 K/UL (ref 0–0.2)
BASOPHILS NFR BLD: 0 % (ref 0–2)
BNP SERPL-MCNC: 2828 PG/ML (ref 0–450)
BUN SERPL-MCNC: 25 MG/DL (ref 8–23)
C PNEUM DNA NPH QL NAA+NON-PROBE: NOT DETECTED
CALCIUM SERPL-MCNC: 9.5 MG/DL (ref 8.8–10.2)
CHLORIDE SERPL-SCNC: 94 MMOL/L (ref 98–107)
CO2 SERPL-SCNC: 22 MMOL/L (ref 22–29)
CREAT SERPL-MCNC: 0.9 MG/DL (ref 0.5–1)
EOSINOPHIL # BLD: 0 K/UL (ref 0.05–0.5)
EOSINOPHILS RELATIVE PERCENT: 0 % (ref 0–6)
ERYTHROCYTE [DISTWIDTH] IN BLOOD BY AUTOMATED COUNT: 13.6 % (ref 11.5–15)
FLUAV RNA NPH QL NAA+NON-PROBE: NOT DETECTED
FLUBV RNA NPH QL NAA+NON-PROBE: NOT DETECTED
GFR, ESTIMATED: 66 ML/MIN/1.73M2
GLUCOSE BLD-MCNC: 147 MG/DL (ref 74–99)
GLUCOSE BLD-MCNC: 202 MG/DL (ref 74–99)
GLUCOSE BLD-MCNC: 248 MG/DL (ref 74–99)
GLUCOSE SERPL-MCNC: 133 MG/DL (ref 74–99)
HADV DNA NPH QL NAA+NON-PROBE: NOT DETECTED
HCOV 229E RNA NPH QL NAA+NON-PROBE: NOT DETECTED
HCOV HKU1 RNA NPH QL NAA+NON-PROBE: NOT DETECTED
HCOV NL63 RNA NPH QL NAA+NON-PROBE: NOT DETECTED
HCOV OC43 RNA NPH QL NAA+NON-PROBE: NOT DETECTED
HCT VFR BLD AUTO: 34.4 % (ref 34–48)
HGB BLD-MCNC: 11.6 G/DL (ref 11.5–15.5)
HMPV RNA NPH QL NAA+NON-PROBE: NOT DETECTED
HPIV1 RNA NPH QL NAA+NON-PROBE: NOT DETECTED
HPIV2 RNA NPH QL NAA+NON-PROBE: NOT DETECTED
HPIV3 RNA NPH QL NAA+NON-PROBE: NOT DETECTED
HPIV4 RNA NPH QL NAA+NON-PROBE: NOT DETECTED
IMM GRANULOCYTES # BLD AUTO: 0.1 K/UL (ref 0–0.58)
IMM GRANULOCYTES NFR BLD: 1 % (ref 0–5)
LYMPHOCYTES NFR BLD: 0.28 K/UL (ref 1.5–4)
LYMPHOCYTES RELATIVE PERCENT: 1 % (ref 20–42)
M PNEUMO DNA NPH QL NAA+NON-PROBE: NOT DETECTED
MAGNESIUM SERPL-MCNC: 1.9 MG/DL (ref 1.6–2.4)
MCH RBC QN AUTO: 29.2 PG (ref 26–35)
MCHC RBC AUTO-ENTMCNC: 33.7 G/DL (ref 32–34.5)
MCV RBC AUTO: 86.6 FL (ref 80–99.9)
MONOCYTES NFR BLD: 1.25 K/UL (ref 0.1–0.95)
MONOCYTES NFR BLD: 6 % (ref 2–12)
NEUTROPHILS NFR BLD: 92 % (ref 43–80)
NEUTS SEG NFR BLD: 18.58 K/UL (ref 1.8–7.3)
PHOSPHATE SERPL-MCNC: 2.7 MG/DL (ref 2.5–4.5)
PLATELET # BLD AUTO: 387 K/UL (ref 130–450)
PMV BLD AUTO: 9.9 FL (ref 7–12)
POTASSIUM SERPL-SCNC: 3.4 MMOL/L (ref 3.5–5.1)
PROCALCITONIN SERPL-MCNC: 0.71 NG/ML (ref 0–0.08)
RBC # BLD AUTO: 3.97 M/UL (ref 3.5–5.5)
RBC # BLD: ABNORMAL 10*6/UL
RSV RNA NPH QL NAA+NON-PROBE: NOT DETECTED
RV+EV RNA NPH QL NAA+NON-PROBE: NOT DETECTED
SARS-COV-2 RNA NPH QL NAA+NON-PROBE: NOT DETECTED
SODIUM SERPL-SCNC: 134 MMOL/L (ref 136–145)
SPECIMEN DESCRIPTION: NORMAL
WBC OTHER # BLD: 20.2 K/UL (ref 4.5–11.5)

## 2025-06-28 PROCEDURE — 87081 CULTURE SCREEN ONLY: CPT

## 2025-06-28 PROCEDURE — 6360000002 HC RX W HCPCS

## 2025-06-28 PROCEDURE — 6360000002 HC RX W HCPCS: Performed by: INTERNAL MEDICINE

## 2025-06-28 PROCEDURE — 2060000000 HC ICU INTERMEDIATE R&B

## 2025-06-28 PROCEDURE — 99232 SBSQ HOSP IP/OBS MODERATE 35: CPT | Performed by: STUDENT IN AN ORGANIZED HEALTH CARE EDUCATION/TRAINING PROGRAM

## 2025-06-28 PROCEDURE — 80048 BASIC METABOLIC PNL TOTAL CA: CPT

## 2025-06-28 PROCEDURE — 6370000000 HC RX 637 (ALT 250 FOR IP)

## 2025-06-28 PROCEDURE — 6370000000 HC RX 637 (ALT 250 FOR IP): Performed by: INTERNAL MEDICINE

## 2025-06-28 PROCEDURE — 94668 MNPJ CHEST WALL SBSQ: CPT

## 2025-06-28 PROCEDURE — 2500000003 HC RX 250 WO HCPCS: Performed by: INTERNAL MEDICINE

## 2025-06-28 PROCEDURE — 71045 X-RAY EXAM CHEST 1 VIEW: CPT

## 2025-06-28 PROCEDURE — 84100 ASSAY OF PHOSPHORUS: CPT

## 2025-06-28 PROCEDURE — 0202U NFCT DS 22 TRGT SARS-COV-2: CPT

## 2025-06-28 PROCEDURE — 82962 GLUCOSE BLOOD TEST: CPT

## 2025-06-28 PROCEDURE — 87070 CULTURE OTHR SPECIMN AEROBIC: CPT

## 2025-06-28 PROCEDURE — 85025 COMPLETE CBC W/AUTO DIFF WBC: CPT

## 2025-06-28 PROCEDURE — 94669 MECHANICAL CHEST WALL OSCILL: CPT

## 2025-06-28 PROCEDURE — 36415 COLL VENOUS BLD VENIPUNCTURE: CPT

## 2025-06-28 PROCEDURE — 51701 INSERT BLADDER CATHETER: CPT

## 2025-06-28 PROCEDURE — 51798 US URINE CAPACITY MEASURE: CPT

## 2025-06-28 PROCEDURE — 84145 PROCALCITONIN (PCT): CPT

## 2025-06-28 PROCEDURE — 2500000003 HC RX 250 WO HCPCS

## 2025-06-28 PROCEDURE — 83735 ASSAY OF MAGNESIUM: CPT

## 2025-06-28 PROCEDURE — 2580000003 HC RX 258

## 2025-06-28 PROCEDURE — 87205 SMEAR GRAM STAIN: CPT

## 2025-06-28 PROCEDURE — 94640 AIRWAY INHALATION TREATMENT: CPT

## 2025-06-28 PROCEDURE — 87077 CULTURE AEROBIC IDENTIFY: CPT

## 2025-06-28 PROCEDURE — 83880 ASSAY OF NATRIURETIC PEPTIDE: CPT

## 2025-06-28 PROCEDURE — 6360000002 HC RX W HCPCS: Performed by: NURSE PRACTITIONER

## 2025-06-28 PROCEDURE — 99221 1ST HOSP IP/OBS SF/LOW 40: CPT | Performed by: NURSE PRACTITIONER

## 2025-06-28 PROCEDURE — 2700000000 HC OXYGEN THERAPY PER DAY

## 2025-06-28 RX ORDER — POLYETHYLENE GLYCOL 3350 17 G/17G
17 POWDER, FOR SOLUTION ORAL DAILY
Status: DISCONTINUED | OUTPATIENT
Start: 2025-06-28 | End: 2025-07-01 | Stop reason: HOSPADM

## 2025-06-28 RX ORDER — MAGNESIUM SULFATE 1 G/100ML
1000 INJECTION INTRAVENOUS ONCE
Status: COMPLETED | OUTPATIENT
Start: 2025-06-28 | End: 2025-06-28

## 2025-06-28 RX ORDER — POTASSIUM CHLORIDE 1500 MG/1
40 TABLET, EXTENDED RELEASE ORAL ONCE
Status: COMPLETED | OUTPATIENT
Start: 2025-06-28 | End: 2025-06-28

## 2025-06-28 RX ORDER — DOCUSATE SODIUM 100 MG/1
100 CAPSULE, LIQUID FILLED ORAL DAILY
Status: DISCONTINUED | OUTPATIENT
Start: 2025-06-28 | End: 2025-07-01 | Stop reason: HOSPADM

## 2025-06-28 RX ORDER — ACETYLCYSTEINE 200 MG/ML
600 SOLUTION ORAL; RESPIRATORY (INHALATION)
Status: DISCONTINUED | OUTPATIENT
Start: 2025-06-28 | End: 2025-07-01 | Stop reason: HOSPADM

## 2025-06-28 RX ADMIN — SODIUM CHLORIDE, PRESERVATIVE FREE 10 ML: 5 INJECTION INTRAVENOUS at 09:17

## 2025-06-28 RX ADMIN — SENNOSIDES 17.2 MG: 8.6 TABLET, FILM COATED ORAL at 21:29

## 2025-06-28 RX ADMIN — METHYLPREDNISOLONE SODIUM SUCCINATE 40 MG: 40 INJECTION, POWDER, LYOPHILIZED, FOR SOLUTION INTRAMUSCULAR; INTRAVENOUS at 11:06

## 2025-06-28 RX ADMIN — LEVALBUTEROL 0.63 MG: 0.63 SOLUTION RESPIRATORY (INHALATION) at 20:18

## 2025-06-28 RX ADMIN — ACETYLCYSTEINE 600 MG: 200 INHALANT RESPIRATORY (INHALATION) at 15:54

## 2025-06-28 RX ADMIN — LEVALBUTEROL 0.63 MG: 0.63 SOLUTION RESPIRATORY (INHALATION) at 00:07

## 2025-06-28 RX ADMIN — LEVALBUTEROL 0.63 MG: 0.63 SOLUTION RESPIRATORY (INHALATION) at 04:18

## 2025-06-28 RX ADMIN — METHYLPREDNISOLONE SODIUM SUCCINATE 40 MG: 40 INJECTION, POWDER, LYOPHILIZED, FOR SOLUTION INTRAMUSCULAR; INTRAVENOUS at 22:11

## 2025-06-28 RX ADMIN — POLYETHYLENE GLYCOL 3350 17 G: 17 POWDER, FOR SOLUTION ORAL at 10:44

## 2025-06-28 RX ADMIN — LEVALBUTEROL 0.63 MG: 0.63 SOLUTION RESPIRATORY (INHALATION) at 07:40

## 2025-06-28 RX ADMIN — METOPROLOL SUCCINATE 50 MG: 50 TABLET, EXTENDED RELEASE ORAL at 09:16

## 2025-06-28 RX ADMIN — ACETYLCYSTEINE 600 MG: 200 INHALANT RESPIRATORY (INHALATION) at 11:10

## 2025-06-28 RX ADMIN — POTASSIUM CHLORIDE 40 MEQ: 1500 TABLET, EXTENDED RELEASE ORAL at 09:17

## 2025-06-28 RX ADMIN — GUAIFENESIN 400 MG: 400 TABLET ORAL at 09:16

## 2025-06-28 RX ADMIN — SODIUM CHLORIDE 1500 MG: 0.9 INJECTION, SOLUTION INTRAVENOUS at 11:16

## 2025-06-28 RX ADMIN — TRANEXAMIC ACID 500 MG: 100 INJECTION, SOLUTION INTRAVENOUS at 11:40

## 2025-06-28 RX ADMIN — TRANEXAMIC ACID 500 MG: 100 INJECTION, SOLUTION INTRAVENOUS at 00:08

## 2025-06-28 RX ADMIN — BUDESONIDE 500 MCG: 0.5 SUSPENSION RESPIRATORY (INHALATION) at 20:18

## 2025-06-28 RX ADMIN — ACETYLCYSTEINE 600 MG: 200 INHALANT RESPIRATORY (INHALATION) at 20:18

## 2025-06-28 RX ADMIN — GUAIFENESIN 400 MG: 400 TABLET ORAL at 21:29

## 2025-06-28 RX ADMIN — SODIUM CHLORIDE, PRESERVATIVE FREE 10 ML: 5 INJECTION INTRAVENOUS at 21:31

## 2025-06-28 RX ADMIN — ROSUVASTATIN CALCIUM 5 MG: 5 TABLET, FILM COATED ORAL at 21:29

## 2025-06-28 RX ADMIN — LEVALBUTEROL 0.63 MG: 0.63 SOLUTION RESPIRATORY (INHALATION) at 15:54

## 2025-06-28 RX ADMIN — LEVALBUTEROL 0.63 MG: 0.63 SOLUTION RESPIRATORY (INHALATION) at 11:10

## 2025-06-28 RX ADMIN — CEFEPIME 2000 MG: 2 INJECTION, POWDER, FOR SOLUTION INTRAVENOUS at 11:00

## 2025-06-28 RX ADMIN — BUDESONIDE 500 MCG: 0.5 SUSPENSION RESPIRATORY (INHALATION) at 07:40

## 2025-06-28 RX ADMIN — INSULIN LISPRO 4 UNITS: 100 INJECTION, SOLUTION INTRAVENOUS; SUBCUTANEOUS at 11:06

## 2025-06-28 RX ADMIN — DORNASE ALFA 2.5 MG: 1 SOLUTION RESPIRATORY (INHALATION) at 07:40

## 2025-06-28 RX ADMIN — APIXABAN 2.5 MG: 2.5 TABLET, FILM COATED ORAL at 09:16

## 2025-06-28 RX ADMIN — ACETYLCYSTEINE 600 MG: 200 INHALANT RESPIRATORY (INHALATION) at 07:40

## 2025-06-28 RX ADMIN — ASPIRIN 81 MG CHEWABLE TABLET 81 MG: 81 TABLET CHEWABLE at 09:16

## 2025-06-28 RX ADMIN — AMLODIPINE BESYLATE 5 MG: 5 TABLET ORAL at 09:16

## 2025-06-28 RX ADMIN — MAGNESIUM SULFATE HEPTAHYDRATE 1000 MG: 1 INJECTION, SOLUTION INTRAVENOUS at 09:25

## 2025-06-28 RX ADMIN — CEFEPIME 2000 MG: 2 INJECTION, POWDER, FOR SOLUTION INTRAVENOUS at 22:18

## 2025-06-28 RX ADMIN — INSULIN LISPRO 4 UNITS: 100 INJECTION, SOLUTION INTRAVENOUS; SUBCUTANEOUS at 17:06

## 2025-06-28 RX ADMIN — AMIODARONE HYDROCHLORIDE 200 MG: 200 TABLET ORAL at 09:16

## 2025-06-28 RX ADMIN — AMIODARONE HYDROCHLORIDE 200 MG: 200 TABLET ORAL at 21:29

## 2025-06-28 RX ADMIN — GUAIFENESIN 400 MG: 400 TABLET ORAL at 16:57

## 2025-06-28 ASSESSMENT — PAIN SCALES - GENERAL
PAINLEVEL_OUTOF10: 0
PAINLEVEL_OUTOF10: 0

## 2025-06-28 NOTE — CONSULTS
Palliative Care Department  [phone]  Palliative Care Initial Consult  Provider Angie Meyer, APRN - CNP    Terra Mahan  [id number]  Hospital Day: 6  Date of Initial Consult: 6/28/2025  Referring Provider: Caitlin Porras MD  Palliative Medicine was consulted for assistance with: Goals of care discussion    HPI:   Terra Mahan is a 89 y.o. with a medical history of lung mass, hypertension, hyperlipidemia and arthritis who was admitted on 6/23/2025 from home with a CHIEF COMPLAINT of A-fib s/p bronchoscopy.  Patient presented to outpatient bronchoscopy today and underwent MBS with FNA biopsy by pulmonary.  Postprocedure patient went into A-fib with RVR associated with hypotension.  Patient admitted to ICU for further medical management.  Pathology findings s/p EBUS invasive moderately differentiated squamous cell carcinoma.  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 Capacity is time limited and situation/question specific  During encounter Hill  was surrogate medical decision-maker  Outcome of goals of care meeting:   Son discussing code status options with family FULL vs CCA Vs CC  Code status Full Code  Advanced Directives: no POA or living will in epic  Surrogate/Legal NOK:  Hill Mahan (child) [phone]    Spiritual assessment: no spiritual distress identified  Bereavement and grief: to be determined  Referrals to: none today  SUBJECTIVE:     Current medical issues leading to Palliative Medicine involvement include   Active Hospital Problems    Diagnosis Date Noted    Atrial fibrillation with 
[facility]  Internal Medicine Residency Program  CONSULT NOTE  MICU    Patient:  Terra Mahan 89 y.o. female MRN: [id number]     Date of Service: 6/23/2025    Hospital Day: 1      Chief complaint: No chief complaint on file.    History of Present Illness   Terra Mahan is a 89 y.o. female with PMH of HTN, HLD, and RLL lung mass likely metastatic cancer who presented to the hospital for elective EBUS and biopsy of RLL lung mass. She is being followed by oncology ([facility]), pulmonology (Dr. Ngo), and radiation oncology for lung mass, adenopathy, and osteolytic right clavicle lesion. She underwent EBUS and biopsy of RLL lung mass today and while in the PACU she went into A-fib RVR, was started on diltiazem infusion and transferred to the MICU. On arrival to MICU, she was asymptomatic but HR in the 150s and blood pressure borderline low-normal on diltiazem infusion. She denied chest pain/heaviness, dizziness/lightheadedness, or worsening SOB. She only complained of right shoulder pain that is improving. She denied history of A-fib or being on anticoagulation. She denies alcohol use.      Past Medical History:      Diagnosis Date    Arthritis     arms,shoulders,hips    History of PSVT (paroxysmal supraventricular tachycardia) 2009    no episodes since    [place] (hard of hearing)     Hyperlipidemia     Hypertension     Normal nuclear stress test 1998 ?    fontanerosa    Thyroid disease     as teenager    Vertigo        Past Surgical History:        Procedure Laterality Date    BREAST SURGERY  1960's    left breast bx  neg    CARDIAC CATHETERIZATION  1996?    neg    CAROTID ENDARTERECTOMY  2004?    left   Dr OTTO    CHOLECYSTECTOMY, LAPAROSCOPIC N/A 6/8/2023    CHOLECYSTECTOMY LAPAROSCOPIC WITH CHOLANGIOGRAM (CPT 96295) performed by Brennen Oneill MD at [facility] OR    COLONOSCOPY  2003    neg    COLONOSCOPY  07/07/2016    polyps, transverse, sigmoid    EYE SURGERY  2010    esme iol implants    
reviewed all done pertinent laboratory data, imaging studies, ECGs and rhythm strips. I have reviewed and agree with the APN history and physical exam as documented in the above note.    Electronically signed by Sukh Valle MD on 6/24/2025 at 3:45 PM    We were asked see the patient for \" new onset a fib s/p EBUS, troponin trending up \"    IMPRESSION:  NSTEMI in the context of A-fib with RVR.  Denies chest pain  A-fib with RVR (while in PACU after EBUS).  Converted to sinus rhythm with IV amiodarone.  History of PSVT  Right lower lobe lung mass (with postobstructive atelectasis) suspicious for malignancy status post EBUS: Pathology pending  COPD  History of tobacco abuse, quit 2 to 3 weeks ago  Hypertension  Reported history of hyperlipidemia  Peripheral arterial disease status post left SFA percutaneous revascularization 4/2021  History of severe left carotid stenosis status post stenting 11/2011  Reported history of hypothyroidism: Not on thyroid replacement therapy.  Normal TSH.  Arthritis.  Chronic right shoulder pain  GERD  History of cholecystectomy  Vertigo  Hard of hearing  Mild hyponatremia  Hypomagnesemia, supplemented.  Hypokalemia, supplemented  Hyperglycemia.  Likely diabetes mellitus.    PLAN:   IV to p.o. amiodarone  Consider oral anticoagulation if recurrent A-fib  Continue IV heparin for 48 hours  Discontinue atenolol  Discontinue HCTZ  Continue to hold amlodipine.  Monitor blood pressure  Start Toprol-XL.  Monitor heart rate  Start low-dose aspirin therapy.  Consider resuming Plavix  Start low-dose statin therapy  Keep potassium ~ 4.0  Keep magnesium ~ 2.0  Check hemoglobin A1c.  Consider oral hypoglycemic agents  Will review echo  Lexiscan nuclear stress test for risk stratification.  Would recommend medical therapy unless the stress test is a high risk stress test  Rest as per the primary service and other consultants  Will follow      I spent 75 minutes completing this encounter. Total time

## 2025-06-29 ENCOUNTER — APPOINTMENT (OUTPATIENT)
Dept: GENERAL RADIOLOGY | Age: 89
DRG: 280 | End: 2025-06-29
Attending: INTERNAL MEDICINE
Payer: MEDICARE

## 2025-06-29 LAB
ANION GAP SERPL CALCULATED.3IONS-SCNC: 14 MMOL/L (ref 7–16)
BASOPHILS # BLD: 0.02 K/UL (ref 0–0.2)
BASOPHILS NFR BLD: 0 % (ref 0–2)
BUN SERPL-MCNC: 31 MG/DL (ref 8–23)
CALCIUM SERPL-MCNC: 9.7 MG/DL (ref 8.8–10.2)
CHLORIDE SERPL-SCNC: 95 MMOL/L (ref 98–107)
CO2 SERPL-SCNC: 23 MMOL/L (ref 22–29)
CREAT SERPL-MCNC: 0.9 MG/DL (ref 0.5–1)
EOSINOPHIL # BLD: 0 K/UL (ref 0.05–0.5)
EOSINOPHILS RELATIVE PERCENT: 0 % (ref 0–6)
ERYTHROCYTE [DISTWIDTH] IN BLOOD BY AUTOMATED COUNT: 14 % (ref 11.5–15)
GFR, ESTIMATED: 61 ML/MIN/1.73M2
GLUCOSE BLD-MCNC: 124 MG/DL (ref 74–99)
GLUCOSE BLD-MCNC: 139 MG/DL (ref 74–99)
GLUCOSE BLD-MCNC: 151 MG/DL (ref 74–99)
GLUCOSE BLD-MCNC: 210 MG/DL (ref 74–99)
GLUCOSE SERPL-MCNC: 130 MG/DL (ref 74–99)
HCT VFR BLD AUTO: 29.8 % (ref 34–48)
HGB BLD-MCNC: 9.8 G/DL (ref 11.5–15.5)
IMM GRANULOCYTES # BLD AUTO: 0.05 K/UL (ref 0–0.58)
IMM GRANULOCYTES NFR BLD: 0 % (ref 0–5)
LYMPHOCYTES NFR BLD: 0.31 K/UL (ref 1.5–4)
LYMPHOCYTES RELATIVE PERCENT: 2 % (ref 20–42)
MAGNESIUM SERPL-MCNC: 2.2 MG/DL (ref 1.6–2.4)
MCH RBC QN AUTO: 28.8 PG (ref 26–35)
MCHC RBC AUTO-ENTMCNC: 32.9 G/DL (ref 32–34.5)
MCV RBC AUTO: 87.6 FL (ref 80–99.9)
MICROORGANISM SPEC CULT: ABNORMAL
MICROORGANISM SPEC CULT: ABNORMAL
MICROORGANISM SPEC CULT: NORMAL
MICROORGANISM/AGENT SPEC: ABNORMAL
MONOCYTES NFR BLD: 0.57 K/UL (ref 0.1–0.95)
MONOCYTES NFR BLD: 4 % (ref 2–12)
NEUTROPHILS NFR BLD: 94 % (ref 43–80)
NEUTS SEG NFR BLD: 14.35 K/UL (ref 1.8–7.3)
PHOSPHATE SERPL-MCNC: 2.1 MG/DL (ref 2.5–4.5)
PLATELET # BLD AUTO: 314 K/UL (ref 130–450)
PMV BLD AUTO: 10 FL (ref 7–12)
POTASSIUM SERPL-SCNC: 4.2 MMOL/L (ref 3.5–5.1)
RBC # BLD AUTO: 3.4 M/UL (ref 3.5–5.5)
RBC # BLD: ABNORMAL 10*6/UL
SODIUM SERPL-SCNC: 132 MMOL/L (ref 136–145)
SPECIMEN DESCRIPTION: ABNORMAL
SPECIMEN DESCRIPTION: NORMAL
WBC OTHER # BLD: 15.3 K/UL (ref 4.5–11.5)

## 2025-06-29 PROCEDURE — 71045 X-RAY EXAM CHEST 1 VIEW: CPT

## 2025-06-29 PROCEDURE — 99232 SBSQ HOSP IP/OBS MODERATE 35: CPT | Performed by: STUDENT IN AN ORGANIZED HEALTH CARE EDUCATION/TRAINING PROGRAM

## 2025-06-29 PROCEDURE — 84100 ASSAY OF PHOSPHORUS: CPT

## 2025-06-29 PROCEDURE — 6370000000 HC RX 637 (ALT 250 FOR IP)

## 2025-06-29 PROCEDURE — 2500000003 HC RX 250 WO HCPCS

## 2025-06-29 PROCEDURE — 6360000002 HC RX W HCPCS: Performed by: INTERNAL MEDICINE

## 2025-06-29 PROCEDURE — 2500000003 HC RX 250 WO HCPCS: Performed by: INTERNAL MEDICINE

## 2025-06-29 PROCEDURE — 94640 AIRWAY INHALATION TREATMENT: CPT

## 2025-06-29 PROCEDURE — 85025 COMPLETE CBC W/AUTO DIFF WBC: CPT

## 2025-06-29 PROCEDURE — 6370000000 HC RX 637 (ALT 250 FOR IP): Performed by: INTERNAL MEDICINE

## 2025-06-29 PROCEDURE — 94669 MECHANICAL CHEST WALL OSCILL: CPT

## 2025-06-29 PROCEDURE — 2060000000 HC ICU INTERMEDIATE R&B

## 2025-06-29 PROCEDURE — 51798 US URINE CAPACITY MEASURE: CPT

## 2025-06-29 PROCEDURE — 2700000000 HC OXYGEN THERAPY PER DAY

## 2025-06-29 PROCEDURE — 99232 SBSQ HOSP IP/OBS MODERATE 35: CPT

## 2025-06-29 PROCEDURE — 82962 GLUCOSE BLOOD TEST: CPT

## 2025-06-29 PROCEDURE — 6360000002 HC RX W HCPCS

## 2025-06-29 PROCEDURE — 2580000003 HC RX 258

## 2025-06-29 PROCEDURE — 94668 MNPJ CHEST WALL SBSQ: CPT

## 2025-06-29 PROCEDURE — 80048 BASIC METABOLIC PNL TOTAL CA: CPT

## 2025-06-29 PROCEDURE — 83735 ASSAY OF MAGNESIUM: CPT

## 2025-06-29 RX ORDER — MORPHINE SULFATE 2 MG/ML
1 INJECTION, SOLUTION INTRAMUSCULAR; INTRAVENOUS
Status: DISCONTINUED | OUTPATIENT
Start: 2025-06-29 | End: 2025-07-01 | Stop reason: HOSPADM

## 2025-06-29 RX ORDER — CALCIUM CARBONATE 500 MG/1
500 TABLET, CHEWABLE ORAL 3 TIMES DAILY PRN
Status: DISCONTINUED | OUTPATIENT
Start: 2025-06-29 | End: 2025-07-01 | Stop reason: HOSPADM

## 2025-06-29 RX ORDER — MORPHINE SULFATE 2 MG/ML
2 INJECTION, SOLUTION INTRAMUSCULAR; INTRAVENOUS
Status: DISCONTINUED | OUTPATIENT
Start: 2025-06-29 | End: 2025-07-01 | Stop reason: HOSPADM

## 2025-06-29 RX ORDER — LORAZEPAM 2 MG/ML
0.5 INJECTION INTRAMUSCULAR EVERY 4 HOURS PRN
Status: DISCONTINUED | OUTPATIENT
Start: 2025-06-29 | End: 2025-06-29

## 2025-06-29 RX ADMIN — APIXABAN 2.5 MG: 2.5 TABLET, FILM COATED ORAL at 21:23

## 2025-06-29 RX ADMIN — AMIODARONE HYDROCHLORIDE 200 MG: 200 TABLET ORAL at 21:23

## 2025-06-29 RX ADMIN — POLYETHYLENE GLYCOL 3350 17 G: 17 POWDER, FOR SOLUTION ORAL at 08:48

## 2025-06-29 RX ADMIN — LEVALBUTEROL 0.63 MG: 0.63 SOLUTION RESPIRATORY (INHALATION) at 11:45

## 2025-06-29 RX ADMIN — ASPIRIN 81 MG CHEWABLE TABLET 81 MG: 81 TABLET CHEWABLE at 08:45

## 2025-06-29 RX ADMIN — ACETYLCYSTEINE 600 MG: 200 INHALANT RESPIRATORY (INHALATION) at 23:19

## 2025-06-29 RX ADMIN — GUAIFENESIN 400 MG: 400 TABLET ORAL at 13:23

## 2025-06-29 RX ADMIN — DOCUSATE SODIUM 100 MG: 100 CAPSULE, LIQUID FILLED ORAL at 08:44

## 2025-06-29 RX ADMIN — ACETAMINOPHEN 650 MG: 325 TABLET ORAL at 13:23

## 2025-06-29 RX ADMIN — DORNASE ALFA 2.5 MG: 1 SOLUTION RESPIRATORY (INHALATION) at 08:29

## 2025-06-29 RX ADMIN — AMIODARONE HYDROCHLORIDE 200 MG: 200 TABLET ORAL at 08:46

## 2025-06-29 RX ADMIN — ROSUVASTATIN CALCIUM 5 MG: 5 TABLET, FILM COATED ORAL at 21:23

## 2025-06-29 RX ADMIN — SENNOSIDES 17.2 MG: 8.6 TABLET, FILM COATED ORAL at 21:23

## 2025-06-29 RX ADMIN — GUAIFENESIN 400 MG: 400 TABLET ORAL at 08:48

## 2025-06-29 RX ADMIN — METHYLPREDNISOLONE SODIUM SUCCINATE 40 MG: 40 INJECTION, POWDER, LYOPHILIZED, FOR SOLUTION INTRAMUSCULAR; INTRAVENOUS at 21:24

## 2025-06-29 RX ADMIN — ACETYLCYSTEINE 600 MG: 200 INHALANT RESPIRATORY (INHALATION) at 11:45

## 2025-06-29 RX ADMIN — ACETYLCYSTEINE 600 MG: 200 INHALANT RESPIRATORY (INHALATION) at 08:04

## 2025-06-29 RX ADMIN — BUDESONIDE 500 MCG: 0.5 SUSPENSION RESPIRATORY (INHALATION) at 08:04

## 2025-06-29 RX ADMIN — WATER 2000 MG: 1 INJECTION INTRAMUSCULAR; INTRAVENOUS; SUBCUTANEOUS at 10:26

## 2025-06-29 RX ADMIN — LEVALBUTEROL 0.63 MG: 0.63 SOLUTION RESPIRATORY (INHALATION) at 00:33

## 2025-06-29 RX ADMIN — SODIUM CHLORIDE, PRESERVATIVE FREE 10 ML: 5 INJECTION INTRAVENOUS at 21:24

## 2025-06-29 RX ADMIN — VANCOMYCIN HYDROCHLORIDE 1000 MG: 1 INJECTION, POWDER, LYOPHILIZED, FOR SOLUTION INTRAVENOUS at 11:03

## 2025-06-29 RX ADMIN — INSULIN LISPRO 2 UNITS: 100 INJECTION, SOLUTION INTRAVENOUS; SUBCUTANEOUS at 10:26

## 2025-06-29 RX ADMIN — INSULIN LISPRO 4 UNITS: 100 INJECTION, SOLUTION INTRAVENOUS; SUBCUTANEOUS at 21:24

## 2025-06-29 RX ADMIN — LEVALBUTEROL 0.63 MG: 0.63 SOLUTION RESPIRATORY (INHALATION) at 15:25

## 2025-06-29 RX ADMIN — SODIUM PHOSPHATE, MONOBASIC, MONOHYDRATE AND SODIUM PHOSPHATE, DIBASIC, ANHYDROUS 15 MMOL: 142; 276 INJECTION, SOLUTION INTRAVENOUS at 07:46

## 2025-06-29 RX ADMIN — GUAIFENESIN 400 MG: 400 TABLET ORAL at 21:23

## 2025-06-29 RX ADMIN — METOPROLOL SUCCINATE 50 MG: 50 TABLET, EXTENDED RELEASE ORAL at 08:45

## 2025-06-29 RX ADMIN — ACETYLCYSTEINE 600 MG: 200 INHALANT RESPIRATORY (INHALATION) at 15:25

## 2025-06-29 RX ADMIN — LEVALBUTEROL 0.63 MG: 0.63 SOLUTION RESPIRATORY (INHALATION) at 23:19

## 2025-06-29 RX ADMIN — SODIUM CHLORIDE, PRESERVATIVE FREE 10 ML: 5 INJECTION INTRAVENOUS at 07:46

## 2025-06-29 RX ADMIN — AMLODIPINE BESYLATE 5 MG: 5 TABLET ORAL at 08:47

## 2025-06-29 RX ADMIN — METHYLPREDNISOLONE SODIUM SUCCINATE 40 MG: 40 INJECTION, POWDER, LYOPHILIZED, FOR SOLUTION INTRAMUSCULAR; INTRAVENOUS at 10:31

## 2025-06-29 RX ADMIN — CALCIUM CARBONATE 500 MG: 500 TABLET, CHEWABLE ORAL at 19:51

## 2025-06-29 RX ADMIN — LEVALBUTEROL 0.63 MG: 0.63 SOLUTION RESPIRATORY (INHALATION) at 08:04

## 2025-06-29 RX ADMIN — ACETYLCYSTEINE 600 MG: 200 INHALANT RESPIRATORY (INHALATION) at 00:33

## 2025-06-29 ASSESSMENT — PAIN SCALES - GENERAL
PAINLEVEL_OUTOF10: 6
PAINLEVEL_OUTOF10: 0

## 2025-06-29 ASSESSMENT — PAIN DESCRIPTION - LOCATION: LOCATION: SHOULDER

## 2025-06-29 ASSESSMENT — PAIN SCALES - WONG BAKER: WONGBAKER_NUMERICALRESPONSE: NO HURT

## 2025-06-29 ASSESSMENT — PAIN DESCRIPTION - DESCRIPTORS: DESCRIPTORS: SORE;SQUEEZING;STABBING

## 2025-06-29 ASSESSMENT — PULMONARY FUNCTION TESTS: PEFR_L/MIN: 3

## 2025-06-29 ASSESSMENT — PAIN DESCRIPTION - ORIENTATION: ORIENTATION: RIGHT;UPPER

## 2025-06-30 ENCOUNTER — TELEPHONE (OUTPATIENT)
Dept: CASE MANAGEMENT | Age: 89
End: 2025-06-30

## 2025-06-30 ENCOUNTER — HOSPITAL ENCOUNTER (OUTPATIENT)
Dept: RADIATION ONCOLOGY | Age: 89
Discharge: HOME | End: 2025-06-30
Payer: MEDICARE

## 2025-06-30 VITALS
RESPIRATION RATE: 18 BRPM | TEMPERATURE: 97.3 F | WEIGHT: 141.76 LBS | HEART RATE: 76 BPM | DIASTOLIC BLOOD PRESSURE: 40 MMHG | SYSTOLIC BLOOD PRESSURE: 124 MMHG | OXYGEN SATURATION: 95 % | HEIGHT: 62 IN | BODY MASS INDEX: 26.09 KG/M2

## 2025-06-30 PROCEDURE — 2500000003 HC RX 250 WO HCPCS: Performed by: INTERNAL MEDICINE

## 2025-06-30 PROCEDURE — 94640 AIRWAY INHALATION TREATMENT: CPT

## 2025-06-30 PROCEDURE — 6370000000 HC RX 637 (ALT 250 FOR IP)

## 2025-06-30 PROCEDURE — 6360000002 HC RX W HCPCS

## 2025-06-30 PROCEDURE — 6360000002 HC RX W HCPCS: Performed by: INTERNAL MEDICINE

## 2025-06-30 PROCEDURE — 99232 SBSQ HOSP IP/OBS MODERATE 35: CPT | Performed by: STUDENT IN AN ORGANIZED HEALTH CARE EDUCATION/TRAINING PROGRAM

## 2025-06-30 PROCEDURE — 2700000000 HC OXYGEN THERAPY PER DAY

## 2025-06-30 RX ADMIN — POLYETHYLENE GLYCOL 3350 17 G: 17 POWDER, FOR SOLUTION ORAL at 09:27

## 2025-06-30 RX ADMIN — MORPHINE SULFATE 2 MG: 2 INJECTION, SOLUTION INTRAMUSCULAR; INTRAVENOUS at 18:44

## 2025-06-30 RX ADMIN — ACETYLCYSTEINE 600 MG: 200 INHALANT RESPIRATORY (INHALATION) at 08:30

## 2025-06-30 RX ADMIN — GUAIFENESIN 400 MG: 400 TABLET ORAL at 09:27

## 2025-06-30 RX ADMIN — DOCUSATE SODIUM 100 MG: 100 CAPSULE, LIQUID FILLED ORAL at 09:27

## 2025-06-30 RX ADMIN — MORPHINE SULFATE 2 MG: 2 INJECTION, SOLUTION INTRAMUSCULAR; INTRAVENOUS at 13:53

## 2025-06-30 RX ADMIN — AMIODARONE HYDROCHLORIDE 200 MG: 200 TABLET ORAL at 09:27

## 2025-06-30 RX ADMIN — MORPHINE SULFATE 1 MG: 2 INJECTION, SOLUTION INTRAMUSCULAR; INTRAVENOUS at 09:35

## 2025-06-30 RX ADMIN — ACETYLCYSTEINE 600 MG: 200 INHALANT RESPIRATORY (INHALATION) at 11:44

## 2025-06-30 RX ADMIN — LEVALBUTEROL 0.63 MG: 0.63 SOLUTION RESPIRATORY (INHALATION) at 19:04

## 2025-06-30 RX ADMIN — BUDESONIDE 500 MCG: 0.5 SUSPENSION RESPIRATORY (INHALATION) at 08:30

## 2025-06-30 RX ADMIN — BUDESONIDE 500 MCG: 0.5 SUSPENSION RESPIRATORY (INHALATION) at 19:04

## 2025-06-30 RX ADMIN — ACETYLCYSTEINE 600 MG: 200 INHALANT RESPIRATORY (INHALATION) at 15:47

## 2025-06-30 RX ADMIN — MORPHINE SULFATE 2 MG: 2 INJECTION, SOLUTION INTRAMUSCULAR; INTRAVENOUS at 15:54

## 2025-06-30 RX ADMIN — DORNASE ALFA 2.5 MG: 1 SOLUTION RESPIRATORY (INHALATION) at 19:04

## 2025-06-30 RX ADMIN — METHYLPREDNISOLONE SODIUM SUCCINATE 40 MG: 40 INJECTION, POWDER, LYOPHILIZED, FOR SOLUTION INTRAMUSCULAR; INTRAVENOUS at 09:26

## 2025-06-30 RX ADMIN — GUAIFENESIN 400 MG: 400 TABLET ORAL at 15:53

## 2025-06-30 RX ADMIN — METOPROLOL SUCCINATE 50 MG: 50 TABLET, EXTENDED RELEASE ORAL at 09:27

## 2025-06-30 RX ADMIN — LEVALBUTEROL 0.63 MG: 0.63 SOLUTION RESPIRATORY (INHALATION) at 08:30

## 2025-06-30 RX ADMIN — LEVALBUTEROL 0.63 MG: 0.63 SOLUTION RESPIRATORY (INHALATION) at 11:44

## 2025-06-30 RX ADMIN — LEVALBUTEROL 0.63 MG: 0.63 SOLUTION RESPIRATORY (INHALATION) at 15:47

## 2025-06-30 RX ADMIN — MORPHINE SULFATE 1 MG: 2 INJECTION, SOLUTION INTRAMUSCULAR; INTRAVENOUS at 13:00

## 2025-06-30 ASSESSMENT — PAIN SCALES - GENERAL: PAINLEVEL_OUTOF10: 4

## 2025-06-30 NOTE — CARE COORDINATION
6/30/2025 Update CM note:  Chart reviewed and case discussed in IDR.  Hospice consult noted.  Family choiced for [facility] hospice.  Per palliative note, family goal of Hospice House.  Referrals made to nursing facilities last week.  [facility]'s [facility].  [facility] unable to accept d/t high liter flow o2 and costly inhaler.  Awaiting response from [facility].  Also awaiting Hospice eval.  CM will follow for transition of care needs.  Electronically signed by Mayelin Martinez RN CM on 6/30/2025 at 10:42 AM

## 2025-06-30 NOTE — TELEPHONE ENCOUNTER
Contacted [facility], Dr. López's office, spoke with triage RN, Yasmin, updated, patient's radiation therapy is ready to begin today at [facility] but patient is currently inpatient at [facility]. Informed her the radiation techs are checking if patient will receive her radiation therapy at [facility] while inpatient which may take a few more days to start due to re-planning because the machines are different or if it will be held until discharge to do at [facility]. Informed the 06/23/2025 EBUS pathology report is available, Yasmin stated they didn't have the results and she'll contacted medical records asking for them. She stated she'll update Dr. López.Svetlana Stevens  RN, ADN, BSE, OCN  Patient Nurse Navigator

## 2025-06-30 NOTE — CARE COORDINATION
6/30/2025 Update CM note:  Per Keli Gar RN [facility], pt has been accepted at [facility] and has requested that I set up transport asap.  Physician ambulance will  at 6pm to take to [facility].  I called patients son, Hill Mahan [phone], and notified him of acceptance to [facility] and  time of 6pm.  Bedside nurse aware.  Ambulance form completed and in envelope on soft chart.  Electronically signed by Mayelin Martinez RN CM on 6/30/2025 at 4:20 PM

## 2025-06-30 NOTE — DISCHARGE INSTR - COC
Dressing/Treatment Open to air 25 0800   Wound Length (cm) 1 cm 25 075   Wound Width (cm) 0.5 cm 25 075   Wound Depth (cm) 0.1 cm 25 075   Wound Surface Area (cm^2) 0.5 cm^2 25 075   Change in Wound Size % (l*w) 50 25 0759   Wound Volume (cm^3) 0.05 cm^3 25 075   Wound Healing % -400 25 075   Wound Assessment Pink/red 25 08   Drainage Amount None (dry) 25 0800   Odor None 25 075   Divina-wound Assessment Blanchable erythema 25 08   Number of days: 6        Elimination:  Continence:   Bowel: {YES / NO:}  Bladder: {YES / NO:}  Urinary Catheter: {Urinary Catheter:165075682}   Colostomy/Ileostomy/Ileal Conduit: {YES / NO:}       Date of Last BM: ***    Intake/Output Summary (Last 24 hours) at 2025 1612  Last data filed at 2025 1800  Gross per 24 hour   Intake --   Output 0 ml   Net 0 ml     I/O last 3 completed shifts:  In: 1065.9 [P.O.:480; IV Piggyback:585.9]  Out: 745 [Urine:745]    Safety Concerns:     { KAE Safety Concerns:321504829}    Impairments/Disabilities:      { KAE Impairments/Disabilities:884181165}    Nutrition Therapy:  Current Nutrition Therapy:   { KAE Diet List:868924527}    Routes of Feeding: {CHP DME Other Feedings:462956640}  Liquids: {Slp liquid thickness:01108}  Daily Fluid Restriction: {CHP DME Yes amt example:733475836}  Last Modified Barium Swallow with Video (Video Swallowing Test): {Done Not Done Date:}    Treatments at the Time of Hospital Discharge:   Respiratory Treatments: ***  Oxygen Therapy:  {Therapy; copd oxygen:32358}  Ventilator:    { CC Vent List:394106145}    Rehab Therapies: {THERAPEUTIC INTERVENTION:0150937653}  Weight Bearing Status/Restrictions: {MH CC Weight Bearin}  Other Medical Equipment (for information only, NOT a DME order):  {EQUIPMENT:294664814}  Other Treatments: ***    Patient's personal belongings (please select all that are sent

## 2025-06-30 NOTE — DISCHARGE SUMMARY
[facility] Hospitalist Discharge Summary         Terra Mahan  MRN: [id number]  Account Number: [id number]  Admitted: 6/23/2025  Discharge Date: 06/30/25    PCP Handoff    Recommended Outpatient Testing      Results Pending At Discharge          Clinical Summary  Patient admitted on 6/23/2025      Terra Mahan is a 89 y.o. female patient of [facility], Becca Forbes MD with history of PSVT, hypertension, hyperlipidemia, hypothyroidism presented to [facility] on 6/23/2025 for outpatient bronchoscopy and underwent EBUS.  Postprocedure patient went into A-fib with RVR associated with hypotension and admitted to ICU.  While in the ICU patient initially on Cardizem but due to hypotension changed to amiodarone drip.  Patient converted to sinus rhythm on 6/23.  Patient remains asymptomatic.  Cardiology evaluated the patient.     NSTEMI 2/2 A-fib with RVR  A-fib with RVR:  Continue amiodarone  Currently on heparin drip, will transition to Eliquis once cleared by cardiology.  Stress test reviewed showing moderate risk of cardiac event based on ischemic dilatation.  Advised by cardiology, no acute intervention recommended.  Signed off.  Continue on Toprol XL, aspirin, statin.   Eliquis remains on hold pending any further intervention per ICU team.     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  Patient is status post bronchoscopy on 6/26 with improvement in aeration on the right side.  Repeat x-ray on 6/28 showing worsening opacification on the right side.  Patient with increased oxygen requirement.  Started on ceftriaxone and

## 2025-06-30 NOTE — PLAN OF CARE
Problem: Discharge Planning  Goal: Discharge to home or other facility with appropriate resources  6/29/2025 0206 by Gerson Poole RN  Outcome: Progressing  Flowsheets (Taken 6/28/2025 2000 by Cande Schmitz, RN)  Discharge to home or other facility with appropriate resources:   Identify barriers to discharge with patient and caregiver   Arrange for needed discharge resources and transportation as appropriate   Identify discharge learning needs (meds, wound care, etc)  6/28/2025 2023 by Cande Schmitz, RN  Outcome: Not Progressing  Flowsheets (Taken 6/28/2025 2000)  Discharge to home or other facility with appropriate resources:   Identify barriers to discharge with patient and caregiver   Arrange for needed discharge resources and transportation as appropriate   Identify discharge learning needs (meds, wound care, etc)  6/28/2025 1314 by Lawanda Mario RN  Outcome: Not Progressing     Problem: Respiratory - Adult  Goal: Achieves optimal ventilation and oxygenation  6/29/2025 0206 by Gerson Poole RN  Outcome: Progressing  Flowsheets (Taken 6/26/2025 0000 by Edmond Jacobson RN)  Achieves optimal ventilation and oxygenation:   Assess for changes in respiratory status   Assess for changes in mentation and behavior   Position to facilitate oxygenation and minimize respiratory effort   Oxygen supplementation based on oxygen saturation or arterial blood gases   Initiate smoking cessation protocol as indicated   Assess the need for suctioning and aspirate as needed   Encourage broncho-pulmonary hygiene including cough, deep breathe, incentive spirometry   Assess and instruct to report shortness of breath or any respiratory difficulty   Respiratory therapy support as indicated  6/28/2025 2023 by Cande Schmitz RN  Outcome: Not Progressing  6/28/2025 1314 by Lawanda Mario RN  Outcome: Not Progressing     Problem: Cardiovascular - Adult  Goal: Maintains optimal cardiac output and hemodynamic stability  6/29/2025 
  Problem: Discharge Planning  Goal: Discharge to home or other facility with appropriate resources  6/29/2025 0945 by Alverto Alatorre RN  Outcome: Progressing  6/29/2025 0206 by Gerson Poole RN  Outcome: Progressing  Flowsheets (Taken 6/28/2025 2000 by Cande Schmitz, RN)  Discharge to home or other facility with appropriate resources:   Identify barriers to discharge with patient and caregiver   Arrange for needed discharge resources and transportation as appropriate   Identify discharge learning needs (meds, wound care, etc)  6/28/2025 2023 by Cande Schmitz RN  Outcome: Not Progressing  Flowsheets (Taken 6/28/2025 2000)  Discharge to home or other facility with appropriate resources:   Identify barriers to discharge with patient and caregiver   Arrange for needed discharge resources and transportation as appropriate   Identify discharge learning needs (meds, wound care, etc)     Problem: Respiratory - Adult  Goal: Achieves optimal ventilation and oxygenation  6/29/2025 0945 by Alverto Alatorre RN  Outcome: Progressing  6/29/2025 0206 by Gerson Poole RN  Outcome: Progressing  Flowsheets (Taken 6/26/2025 0000 by Edmond Jacobson RN)  Achieves optimal ventilation and oxygenation:   Assess for changes in respiratory status   Assess for changes in mentation and behavior   Position to facilitate oxygenation and minimize respiratory effort   Oxygen supplementation based on oxygen saturation or arterial blood gases   Initiate smoking cessation protocol as indicated   Assess the need for suctioning and aspirate as needed   Encourage broncho-pulmonary hygiene including cough, deep breathe, incentive spirometry   Assess and instruct to report shortness of breath or any respiratory difficulty   Respiratory therapy support as indicated  6/28/2025 2023 by Cande Schmitz RN  Outcome: Not Progressing     Problem: Cardiovascular - Adult  Goal: Maintains optimal cardiac output and hemodynamic stability  6/29/2025 
  Problem: Discharge Planning  Goal: Discharge to home or other facility with appropriate resources  Outcome: Not Progressing     Problem: Respiratory - Adult  Goal: Achieves optimal ventilation and oxygenation  Outcome: Not Progressing     Problem: Cardiovascular - Adult  Goal: Maintains optimal cardiac output and hemodynamic stability  Outcome: Not Progressing     Problem: Skin/Tissue Integrity - Adult  Goal: Skin integrity remains intact  Description: 1.  Monitor for areas of redness and/or skin breakdown  2.  Assess vascular access sites hourly  3.  Every 4-6 hours minimum:  Change oxygen saturation probe site  4.  Every 4-6 hours:  If on nasal continuous positive airway pressure, respiratory therapy assess nares and determine need for appliance change or resting period  Outcome: Not Progressing     Problem: Musculoskeletal - Adult  Goal: Return mobility to safest level of function  Outcome: Not Progressing  Goal: Return ADL status to a safe level of function  Outcome: Not Progressing     Problem: Gastrointestinal - Adult  Goal: Minimal or absence of nausea and vomiting  Outcome: Not Progressing  Goal: Maintains or returns to baseline bowel function  Outcome: Not Progressing  Goal: Maintains adequate nutritional intake  Outcome: Not Progressing     Problem: Genitourinary - Adult  Goal: Absence of urinary retention  Outcome: Not Progressing     Problem: Infection - Adult  Goal: Absence of infection at discharge  Outcome: Not Progressing  Goal: Absence of infection during hospitalization  Outcome: Not Progressing  Goal: Absence of fever/infection during anticipated neutropenic period  Outcome: Not Progressing     Problem: Metabolic/Fluid and Electrolytes - Adult  Goal: Electrolytes maintained within normal limits  Outcome: Not Progressing  Goal: Hemodynamic stability and optimal renal function maintained  Outcome: Not Progressing  Goal: Glucose maintained within prescribed range  Outcome: Not Progressing   
  Problem: Musculoskeletal - Adult  Goal: Return mobility to safest level of function  6/23/2025 2040 by Peggy Knight, RN  Outcome: Progressing  Flowsheets (Taken 6/23/2025 2000)  Return Mobility to Safest Level of Function:   Assist with transfers and ambulation using safe patient handling equipment as needed   Assess patient stability and activity tolerance for standing, transferring and ambulating with or without assistive devices   Ensure adequate protection for wounds/incisions during mobilization   Obtain physical therapy/occupational therapy consults as needed  Goal: Return ADL status to a safe level of function  6/23/2025 2040 by Peggy Knight, RN  Outcome: Progressing  Flowsheets (Taken 6/23/2025 2000)  Return ADL Status to a Safe Level of Function:   Administer medication as ordered   Assist and instruct patient to increase activity and self care as tolerated   Obtain physical therapy/occupational therapy consults as needed   Assess activities of daily living deficits and provide assistive devices as needed  Problem: Pain  Goal: Verbalizes/displays adequate comfort level or baseline comfort level  6/23/2025 2040 by Peggy Knight RN  Outcome: Progressing  Flowsheets (Taken 6/23/2025 2000)  Verbalizes/displays adequate comfort level or baseline comfort level:   Encourage patient to monitor pain and request assistance   Assess pain using appropriate pain scale   Administer analgesics based on type and severity of pain and evaluate response   Consider cultural and social influences on pain and pain management   Implement non-pharmacological measures as appropriate and evaluate response     Problem: Safety - Adult  Goal: Free from fall injury  6/23/2025 2040 by Peggy Knight, RN  Outcome: Progressing  Flowsheets (Taken 6/23/2025 2000)  Free From Fall Injury:   Instruct family/caregiver on patient safety   Based on caregiver fall risk screen, instruct family/caregiver to ask for assistance with transferring infant 
  Problem: Musculoskeletal - Adult  Goal: Return mobility to safest level of function  6/24/2025 0940 by Emilie Weinstein RN  Outcome: Not Progressing  6/23/2025 2040 by Peggy Knight RN  Outcome: Progressing  Flowsheets (Taken 6/23/2025 2000)  Return Mobility to Safest Level of Function:   Assist with transfers and ambulation using safe patient handling equipment as needed   Assess patient stability and activity tolerance for standing, transferring and ambulating with or without assistive devices   Ensure adequate protection for wounds/incisions during mobilization   Obtain physical therapy/occupational therapy consults as needed  Goal: Return ADL status to a safe level of function  6/24/2025 0940 by Emilie Weinstein RN  Outcome: Not Progressing  6/23/2025 2040 by Peggy Knight RN  Outcome: Progressing  Flowsheets (Taken 6/23/2025 2000)  Return ADL Status to a Safe Level of Function:   Administer medication as ordered   Assist and instruct patient to increase activity and self care as tolerated   Obtain physical therapy/occupational therapy consults as needed   Assess activities of daily living deficits and provide assistive devices as needed     Problem: Gastrointestinal - Adult  Goal: Maintains adequate nutritional intake  6/24/2025 0940 by Emilie Weinstein RN  Outcome: Not Progressing  Flowsheets (Taken 6/24/2025 0800)  Maintains adequate nutritional intake: Monitor intake and output, weight and lab values  6/23/2025 2040 by Peggy Knight, RN  Outcome: Progressing  Flowsheets (Taken 6/23/2025 2000)  Maintains adequate nutritional intake:   Monitor percentage of each meal consumed   Assist with meals as needed   Monitor intake and output, weight and lab values   Identify factors contributing to decreased intake, treat as appropriate     Problem: Safety - Adult  Goal: Free from fall injury  6/24/2025 0940 by Emilie Weinstein RN  Outcome: Progressing  Flowsheets (Taken 6/24/2025 0800)  Free From Fall Injury:   Instruct 
  Problem: Musculoskeletal - Adult  Goal: Return mobility to safest level of function  Outcome: Not Progressing  Goal: Return ADL status to a safe level of function  Outcome: Not Progressing     Problem: Safety - Adult  Goal: Free from fall injury  Outcome: Progressing  Flowsheets (Taken 6/23/2025 1846)  Free From Fall Injury:   Instruct family/caregiver on patient safety   Based on caregiver fall risk screen, instruct family/caregiver to ask for assistance with transferring infant if caregiver noted to have fall risk factors     Problem: Pain  Goal: Verbalizes/displays adequate comfort level or baseline comfort level  Outcome: Progressing  Flowsheets (Taken 6/23/2025 1831)  Verbalizes/displays adequate comfort level or baseline comfort level:   Encourage patient to monitor pain and request assistance   Assess pain using appropriate pain scale   Administer analgesics based on type and severity of pain and evaluate response   Implement non-pharmacological measures as appropriate and evaluate response   Consider cultural and social influences on pain and pain management   Notify Licensed Independent Practitioner if interventions unsuccessful or patient reports new pain     Problem: Discharge Planning  Goal: Discharge to home or other facility with appropriate resources  Outcome: Progressing  Flowsheets (Taken 6/23/2025 1841)  Discharge to home or other facility with appropriate resources: Identify barriers to discharge with patient and caregiver     Problem: ABCDS Injury Assessment  Goal: Absence of physical injury  Outcome: Progressing  Flowsheets (Taken 6/23/2025 1846)  Absence of Physical Injury: Implement safety measures based on patient assessment     Problem: Neurosensory - Adult  Goal: Achieves stable or improved neurological status  Outcome: Progressing  Flowsheets (Taken 6/23/2025 1841)  Achieves stable or improved neurological status: Assess for and report changes in neurological status     Problem: 
  Problem: Pain  Goal: Verbalizes/displays adequate comfort level or baseline comfort level  6/26/2025 0000 by Edmond Jacobson RN  Flowsheets (Taken 6/26/2025 0000)  Verbalizes/displays adequate comfort level or baseline comfort level:   Encourage patient to monitor pain and request assistance   Assess pain using appropriate pain scale   Administer analgesics based on type and severity of pain and evaluate response   Implement non-pharmacological measures as appropriate and evaluate response   Consider cultural and social influences on pain and pain management   Notify Licensed Independent Practitioner if interventions unsuccessful or patient reports new pain  6/25/2025 1521 by Mary De RN  Outcome: Not Progressing  Flowsheets (Taken 6/25/2025 1521)  Verbalizes/displays adequate comfort level or baseline comfort level:   Encourage patient to monitor pain and request assistance   Administer analgesics based on type and severity of pain and evaluate response   Assess pain using appropriate pain scale   Implement non-pharmacological measures as appropriate and evaluate response   Consider cultural and social influences on pain and pain management   Notify Licensed Independent Practitioner if interventions unsuccessful or patient reports new pain     Problem: Pain  Goal: Verbalizes/displays adequate comfort level or baseline comfort level  6/26/2025 0000 by Edmond Jacobson RN  Flowsheets (Taken 6/26/2025 0000)  Verbalizes/displays adequate comfort level or baseline comfort level:   Encourage patient to monitor pain and request assistance   Assess pain using appropriate pain scale   Administer analgesics based on type and severity of pain and evaluate response   Implement non-pharmacological measures as appropriate and evaluate response   Consider cultural and social influences on pain and pain management   Notify Licensed Independent Practitioner if interventions unsuccessful or patient reports new 
  Problem: Safety - Adult  Goal: Free from fall injury  6/25/2025 1521 by Mary De RN  Outcome: Progressing  Flowsheets (Taken 6/25/2025 1521)  Free From Fall Injury: Instruct family/caregiver on patient safety  6/25/2025 0630 by Georgette Mahoney RN  Outcome: Progressing  Flowsheets (Taken 6/24/2025 2000)  Free From Fall Injury:   Instruct family/caregiver on patient safety   Based on caregiver fall risk screen, instruct family/caregiver to ask for assistance with transferring infant if caregiver noted to have fall risk factors     Problem: Discharge Planning  Goal: Discharge to home or other facility with appropriate resources  6/25/2025 1521 by Mary De RN  Outcome: Progressing  Flowsheets (Taken 6/24/2025 0800 by Emilie Weinstein RN)  Discharge to home or other facility with appropriate resources: Identify barriers to discharge with patient and caregiver  6/25/2025 0630 by Georgette Mahoney RN  Outcome: Progressing     Problem: ABCDS Injury Assessment  Goal: Absence of physical injury  6/25/2025 1521 by Mary De RN  Outcome: Progressing  Flowsheets (Taken 6/25/2025 1521)  Absence of Physical Injury: Implement safety measures based on patient assessment  6/25/2025 0630 by Georgette Mahoney RN  Outcome: Progressing     Problem: Neurosensory - Adult  Goal: Achieves stable or improved neurological status  6/25/2025 0630 by Georgette Mahoney RN  Outcome: Progressing     Problem: Respiratory - Adult  Goal: Achieves optimal ventilation and oxygenation  6/25/2025 1521 by Mary De RN  Outcome: Progressing  Flowsheets (Taken 6/25/2025 1521)  Achieves optimal ventilation and oxygenation: Assess for changes in respiratory status  6/25/2025 0630 by Georgette Mahoney RN  Outcome: Progressing     Problem: Cardiovascular - Adult  Goal: Maintains optimal cardiac output and hemodynamic stability  6/25/2025 1521 by Mary De RN  Outcome: Progressing  Flowsheets (Taken 6/25/2025 
  Problem: Safety - Adult  Goal: Free from fall injury  Outcome: Progressing     Problem: Pain  Goal: Verbalizes/displays adequate comfort level or baseline comfort level  Outcome: Progressing     Problem: Discharge Planning  Goal: Discharge to home or other facility with appropriate resources  Outcome: Progressing     Problem: ABCDS Injury Assessment  Goal: Absence of physical injury  Outcome: Progressing     Problem: Neurosensory - Adult  Goal: Achieves stable or improved neurological status  Outcome: Progressing  Goal: Absence of seizures  Outcome: Progressing  Goal: Achieves maximal functionality and self care  Outcome: Progressing     Problem: Respiratory - Adult  Goal: Achieves optimal ventilation and oxygenation  Outcome: Progressing     Problem: Cardiovascular - Adult  Goal: Maintains optimal cardiac output and hemodynamic stability  Outcome: Progressing  Goal: Absence of cardiac dysrhythmias or at baseline  Outcome: Progressing     Problem: Skin/Tissue Integrity - Adult  Goal: Skin integrity remains intact  Description: 1.  Monitor for areas of redness and/or skin breakdown  2.  Assess vascular access sites hourly  3.  Every 4-6 hours minimum:  Change oxygen saturation probe site  4.  Every 4-6 hours:  If on nasal continuous positive airway pressure, respiratory therapy assess nares and determine need for appliance change or resting period  Outcome: Progressing  Goal: Incisions, wounds, or drain sites healing without S/S of infection  Outcome: Progressing  Goal: Oral mucous membranes remain intact  Outcome: Progressing     Problem: Musculoskeletal - Adult  Goal: Return mobility to safest level of function  Outcome: Progressing  Goal: Maintain proper alignment of affected body part  Outcome: Progressing  Goal: Return ADL status to a safe level of function  Outcome: Progressing     Problem: Gastrointestinal - Adult  Goal: Minimal or absence of nausea and vomiting  Outcome: Progressing  Goal: Maintains or 
  Problem: Safety - Adult  Goal: Free from fall injury  Outcome: Progressing  Flowsheets (Taken 6/24/2025 2000)  Free From Fall Injury:   Instruct family/caregiver on patient safety   Based on caregiver fall risk screen, instruct family/caregiver to ask for assistance with transferring infant if caregiver noted to have fall risk factors     Problem: Pain  Goal: Verbalizes/displays adequate comfort level or baseline comfort level  Outcome: Progressing     Problem: Discharge Planning  Goal: Discharge to home or other facility with appropriate resources  Outcome: Progressing     Problem: ABCDS Injury Assessment  Goal: Absence of physical injury  Outcome: Progressing     Problem: Neurosensory - Adult  Goal: Achieves stable or improved neurological status  Outcome: Progressing     Problem: Respiratory - Adult  Goal: Achieves optimal ventilation and oxygenation  Outcome: Progressing     Problem: Cardiovascular - Adult  Goal: Maintains optimal cardiac output and hemodynamic stability  Outcome: Progressing     Problem: Skin/Tissue Integrity - Adult  Goal: Skin integrity remains intact  Description: 1.  Monitor for areas of redness and/or skin breakdown  2.  Assess vascular access sites hourly  3.  Every 4-6 hours minimum:  Change oxygen saturation probe site  4.  Every 4-6 hours:  If on nasal continuous positive airway pressure, respiratory therapy assess nares and determine need for appliance change or resting period  Outcome: Progressing  Flowsheets (Taken 6/24/2025 2000)  Skin Integrity Remains Intact:   Monitor for areas of redness and/or skin breakdown   Assess vascular access sites hourly   Every 4-6 hours minimum:  Change oxygen saturation probe site   Every 4-6 hours:  If on nasal continuous positive airway pressure, assess nares and determine need for appliance change or resting period   Turn and reposition as indicated     
This SANDRA had a long discussion with patient's family, about patients current health status and prognosis given her recent diagnosis of lung cancer with metastases. Her son expressed that, patient does not want to be resuscitated, if her condition deteriorates.     I have spoke with patient personally, discussed about her wishes about code status, She mention she does not want be intubated, resuscitated with Chest compressions, shock or  medications.     CODE status updated in the chart.   Limited     Limited Code details:   Intubation/Re-intubation No; Defibrillation/Cardioversion No;   Chest Compressions No;   Resuscitative Medications No;   Other No Comment     Electronically signed by Caitlin Porras MD on 6/28/2025 at 10:59 PM   
(Taken 6/24/2025 0800)  Skin Integrity Remains Intact:   Monitor for areas of redness and/or skin breakdown   Assess vascular access sites hourly   Every 4-6 hours minimum:  Change oxygen saturation probe site   Every 4-6 hours:  If on nasal continuous positive airway pressure, assess nares and determine need for appliance change or resting period   Turn and reposition as indicated        
signs and symptoms of infection   Monitor all insertion sites i.e., indwelling lines, tubes and drains   Monitor endotracheal (as able) and nasal secretions for changes in amount and color  6/29/2025 0945 by Alverto Alatorre RN  Outcome: Progressing  Goal: Absence of infection during hospitalization  6/29/2025 2023 by Cande Schmitz RN  Outcome: Progressing  Flowsheets (Taken 6/29/2025 2000)  Absence of infection during hospitalization:   Assess and monitor for signs and symptoms of infection   Monitor lab/diagnostic results   Monitor all insertion sites i.e., indwelling lines, tubes and drains  6/29/2025 0945 by Alverto Alatorre RN  Outcome: Progressing  Goal: Absence of fever/infection during anticipated neutropenic period  6/29/2025 2023 by Cande Schmitz RN  Outcome: Progressing  Flowsheets (Taken 6/29/2025 2000)  Absence of fever/infection during anticipated neutropenic period:   Monitor white blood cell count   Administer growth factors as ordered   Implement neutropenic guidelines  6/29/2025 0945 by Alverto Alatorre RN  Outcome: Progressing     Problem: Metabolic/Fluid and Electrolytes - Adult  Goal: Electrolytes maintained within normal limits  6/29/2025 2023 by Cande Schmitz RN  Outcome: Progressing  Flowsheets (Taken 6/29/2025 2000)  Electrolytes maintained within normal limits:   Administer electrolyte replacement as ordered   Monitor labs and assess patient for signs and symptoms of electrolyte imbalances   Monitor response to electrolyte replacements, including repeat lab results as appropriate  6/29/2025 0945 by Alverto Alatorre RN  Outcome: Progressing  Goal: Hemodynamic stability and optimal renal function maintained  6/29/2025 2023 by Cande Schmitz RN  Outcome: Progressing  Flowsheets (Taken 6/29/2025 2000)  Hemodynamic stability and optimal renal function maintained:   Monitor labs and assess for signs and symptoms of volume excess or deficit   Monitor intake, output and patient weight   
sites i.e., indwelling lines, tubes and drains  6/28/2025 1314 by Lawanda Mario RN  Outcome: Not Progressing  Goal: Absence of fever/infection during anticipated neutropenic period  6/28/2025 2023 by Cande Schmitz RN  Outcome: Not Progressing  Flowsheets (Taken 6/28/2025 2000)  Absence of fever/infection during anticipated neutropenic period:   Monitor white blood cell count   Administer growth factors as ordered   Implement neutropenic guidelines  6/28/2025 1314 by Lawanda Mario RN  Outcome: Not Progressing     Problem: Metabolic/Fluid and Electrolytes - Adult  Goal: Electrolytes maintained within normal limits  6/28/2025 2023 by Cande Schmitz RN  Outcome: Not Progressing  Flowsheets (Taken 6/28/2025 2000)  Electrolytes maintained within normal limits:   Administer electrolyte replacement as ordered   Monitor labs and assess patient for signs and symptoms of electrolyte imbalances   Monitor response to electrolyte replacements, including repeat lab results as appropriate  6/28/2025 1314 by Lawanda Mario RN  Outcome: Not Progressing  Goal: Hemodynamic stability and optimal renal function maintained  6/28/2025 2023 by Cande Schmitz RN  Outcome: Not Progressing  Flowsheets (Taken 6/28/2025 2000)  Hemodynamic stability and optimal renal function maintained:   Monitor labs and assess for signs and symptoms of volume excess or deficit   Monitor intake, output and patient weight   Monitor urine specific gravity, serum osmolarity and serum sodium as indicated or ordered  6/28/2025 1314 by Lawanda Mario RN  Outcome: Not Progressing  Goal: Glucose maintained within prescribed range  6/28/2025 2023 by Cande Schmitz RN  Outcome: Not Progressing  Flowsheets (Taken 6/28/2025 2000)  Glucose maintained within prescribed range:   Assess for signs and symptoms of hyperglycemia and hypoglycemia   Administer ordered medications to maintain glucose within target range   Monitor blood glucose as ordered  6/28/2025 1314 by

## 2025-07-01 LAB
MICROORGANISM SPEC CULT: ABNORMAL
MICROORGANISM/AGENT SPEC: ABNORMAL
SPECIMEN DESCRIPTION: ABNORMAL

## 2025-07-01 NOTE — PROGRESS NOTES
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 Son present at bedside. Complains of neck. Has lidocaine patch in place.     ROS: denies fever, chills, cp, sob, n/v, HA unless stated above.      scopolamine  1 patch TransDERmal Q72H    sodium chloride flush  5-40 mL IntraVENous 2 times per day    [Held by provider] amLODIPine  5 mg Oral Daily    [Held by provider] atenolol-chlorthalidone  1 tablet Oral Daily    [Held by provider] clopidogrel  75 mg Oral Daily    [Held by provider] ezetimibe  10 mg Oral Daily    insulin lispro  0-6 Units SubCUTAneous 4x Daily AC & HS     sodium chloride flush, 5-40 mL, PRN  sodium chloride, , PRN  ondansetron, 4 mg, Q8H PRN   Or  ondansetron, 4 mg, Q6H PRN  polyethylene glycol, 17 g, Daily PRN  acetaminophen, 650 mg, Q6H PRN   Or  acetaminophen, 650 mg, Q6H PRN  heparin (porcine), 60 Units/kg, PRN  heparin (porcine), 30 Units/kg, PRN  ipratropium 0.5 mg-albuterol 2.5 mg, 1 Dose, Q4H PRN  dextrose bolus, 125 mL, PRN   Or  dextrose bolus, 250 mL, PRN         Objective:    BP (!) 114/51   Pulse 60   Temp 98.3 °F (36.8 °C) (Oral)   Resp 20   Ht 1.575 m (5' 2\")   Wt 59 kg (130 lb 1.1 oz)   SpO2 93%   BMI 23.79 kg/m²     General Appearance: Awake and alert, very hard of hearing.   Skin: warm and dry  Head: normocephalic and atraumatic  Eyes: pupils equal, round, and reactive to light, extraocular eye movements intact, conjunctivae normal  Neck: neck supple and non tender without mass   Pulmonary/Chest: clear to auscultation bilaterally- no wheezes, rales or rhonchi, normal air movement, no respiratory distress  Cardiovascular: normal rate, normal S1 and S2 and no 
      HOSPITALIST PROGRESS NOTE    Patient's name: Terra Mahan  : 1935  Admission date: 2025  Date of service: 2025   Primary care physician: Becca Amador MD    Assessment   Patient admitted on 2025     Terra Mahan is a 89 y.o. female patient of Becca Amador MD with history of PSVT, hypertension, hyperlipidemia, hypothyroidism presented to [facility] on 2025 for outpatient bronchoscopy and underwent EBUS.  Postprocedure patient went into A-fib with RVR associated with hypotension and admitted to ICU.  While in the ICU patient initially on Cardizem but due to hypotension changed to amiodarone drip.  Patient converted to sinus rhythm on .  Patient remains asymptomatic.  Cardiology evaluated the patient.    NSTEMI 2/2 A-fib with RVR  A-fib with RVR:  Continue amiodarone  Currently on heparin drip, will transition to Eliquis once cleared by cardiology.  Stress test reviewed showing moderate risk of cardiac event based on ischemic dilatation.  Advised by cardiology, no acute intervention recommended.  Signed off.  Continue on Toprol XL, aspirin, statin.   Eliquis remains on hold pending any further intervention per ICU team.    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  Patient is status post bronchoscopy on  with improvement in aeration on the right side.  Repeat x-ray on  showing worsening opacification on the right side.  Patient with increased oxygen requirement.  Started on ceftriaxone and vancomycin With positive Citrobacter and Streptococcus pneumonia on respiratory 
      HOSPITALIST PROGRESS NOTE    Patient's name: Terra Mahan  : 1935  Admission date: 2025  Date of service: 2025   Primary care physician: Becca Amador MD    Assessment   Patient admitted on 2025     Terra Mahan is a 89 y.o. female patient of Becca Amador MD with history of PSVT, hypertension, hyperlipidemia, hypothyroidism presented to [facility] on 2025 for outpatient bronchoscopy and underwent EBUS.  Postprocedure patient went into A-fib with RVR associated with hypotension and admitted to ICU.  While in the ICU patient initially on Cardizem but due to hypotension changed to amiodarone drip.  Patient converted to sinus rhythm on .  Patient remains asymptomatic.  Cardiology evaluated the patient.    NSTEMI 2/2 A-fib with RVR  A-fib with RVR:  Continue amiodarone  Currently on heparin drip, will transition to Eliquis once cleared by cardiology.  Stress test reviewed showing moderate risk of cardiac event based on ischemic dilatation.  Advised by cardiology, no acute intervention recommended.  Signed off.  Continue on Toprol XL, aspirin, statin.   Eliquis remains on hold pending any further intervention per ICU team.    Right lower lobe lung mass suspicious for malignancy s/p EBUS  Pathology findings pending  Pulmonology following  Chest x-ray from  showing interval opacification of right hemithorax with rightward mediastinal shift indicating volume loss findings compatible with lobar collapse.  Waiting for further recommendation from pulmonology.      Follow labs   DVT prophylaxis Heparin  Please see orders for further management and care.  Discharge plan: Pending pulmonology plan    Subjective  Terra was seen and examined at bedside.  Patient is alert and oriented, no acute overnight events.  Denies any chest pain.  Feeling fatigued      Review of Systems  All systems reviewed and negative except mentioned above.       Objective  Most 
      HOSPITALIST PROGRESS NOTE    Patient's name: Terra Mahan  : 1935  Admission date: 2025  Date of service: 2025   Primary care physician: Becca Amador MD    Assessment   Patient admitted on 2025     Terra Mahan is a 89 y.o. female patient of Becca Amador MD with history of PSVT, hypertension, hyperlipidemia, hypothyroidism presented to [facility] on 2025 for outpatient bronchoscopy and underwent EBUS.  Postprocedure patient went into A-fib with RVR associated with hypotension and admitted to ICU.  While in the ICU patient initially on Cardizem but due to hypotension changed to amiodarone drip.  Patient converted to sinus rhythm on .  Patient remains asymptomatic.  Cardiology evaluated the patient.    NSTEMI 2/2 A-fib with RVR  A-fib with RVR:  Continue amiodarone  Currently on heparin drip, will transition to Eliquis once cleared by cardiology.  Stress test ordered per cardiology.  Started on Toprol XL, aspirin, statin.  Plavix currently on hold, will resume continue with    Right lower lobe lung mass suspicious for malignancy s/p EBUS  Pathology pending  Pulmonology following      Follow labs   DVT prophylaxis Heparin  Please see orders for further management and care.  Discharge plan: Pending stress test and possible discharge    Subjective  Terra was seen and examined at bedside.  Patient is alert and oriented, no acute overnight events.  Denies any chest pain.      Review of Systems  All systems reviewed and negative except mentioned above.       Objective  Most Recent Recorded Vitals  /66   Pulse 62   Temp 97.7 °F (36.5 °C) (Axillary)   Resp 17   Ht 1.575 m (5' 2\")   Wt 63.9 kg (140 lb 12.8 oz)   SpO2 100%   BMI 25.75 kg/m²     General appearance: awake, alert No apparent distress, appears stated age and cooperative.  HEENT: Normal cephalic, atraumatic without obvious deformity. Pupils equal, round, and reactive to light.  
      HOSPITALIST PROGRESS NOTE    Patient's name: Terra Mahan  : 1935  Admission date: 2025  Date of service: 2025   Primary care physician: Becca Amador MD    Assessment   Patient admitted on 2025     Terra Mahan is a 89 y.o. female patient of Becca Amador MD with history of PSVT, hypertension, hyperlipidemia, hypothyroidism presented to [facility] on 2025 for outpatient bronchoscopy and underwent EBUS.  Postprocedure patient went into A-fib with RVR associated with hypotension and admitted to ICU.  While in the ICU patient initially on Cardizem but due to hypotension changed to amiodarone drip.  Patient converted to sinus rhythm on .  Patient remains asymptomatic.  Cardiology evaluated the patient.    NSTEMI 2/2 A-fib with RVR  A-fib with RVR:  Continue amiodarone  Currently on heparin drip, will transition to Eliquis once cleared by cardiology.  Stress test reviewed showing moderate risk of cardiac event based on ischemic dilatation.  Advised by cardiology, no acute intervention recommended.  Signed off.  Continue on Toprol XL, aspirin, statin.   Eliquis remains on hold pending any further intervention per ICU team.    Right lower lobe lung mass suspicious for malignancy s/p EBUS  Right clavicular pain likely metastatic bronchogenic carcinoma  Pathology findings pending  Pulmonology following  Chest x-ray from  showing interval opacification of right hemithorax with rightward mediastinal shift indicating volume loss findings compatible with lobar collapse.  Patient is status post bronchoscopy on  with improvement in aeration on the right side.      Follow labs   DVT prophylaxis Heparin  Please see orders for further management and care.  Discharge plan: Pending pulmonology plan    Subjective  Terra was seen and examined at bedside.  Patient is alert and oriented, no acute overnight events.  Doing well.  Continue to wean 
      HOSPITALIST PROGRESS NOTE    Patient's name: Terra Mahan  : 1935  Admission date: 2025  Date of service: 2025   Primary care physician: Becca Amador MD    Assessment   Patient admitted on 2025     Terra Mahan is a 89 y.o. female patient of Becca Amador MD with history of PSVT, hypertension, hyperlipidemia, hypothyroidism presented to [facility] on 2025 for outpatient bronchoscopy and underwent EBUS.  Postprocedure patient went into A-fib with RVR associated with hypotension and admitted to ICU.  While in the ICU patient initially on Cardizem but due to hypotension changed to amiodarone drip.  Patient converted to sinus rhythm on .  Patient remains asymptomatic.  Cardiology evaluated the patient.    NSTEMI 2/2 A-fib with RVR  A-fib with RVR:  Continue amiodarone  Currently on heparin drip, will transition to Eliquis once cleared by cardiology.  Stress test reviewed showing moderate risk of cardiac event based on ischemic dilatation.  Advised by cardiology, no acute intervention recommended.  Signed off.  Continue on Toprol XL, aspirin, statin.   Eliquis remains on hold pending any further intervention per ICU team.    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  Patient is status post bronchoscopy on  with improvement in aeration on the right side.  Repeat x-ray on  showing worsening opacification on the right side.  Patient with increased oxygen requirement.      Follow labs   DVT prophylaxis Heparin  Please see orders for further management and care.  Discharge plan: 
    Inpatient Cardiology Progress note     PATIENT IS BEING FOLLOWED FOR: New onset A-fib.  Elevated troponin with upward trend.    Terra Mahan is a 89 y.o. female known to Dr. Marrero and seen in consultation this admission by me on 2025.    Presented 2025 for EBUS for right lung mass with biopsy for possible malignancy.  While in PACU went to A-fib RVR, initiated on diltiazem infusion and transferred to MICU.  Noted to have HR in 150s.  Became hypotensive on diltiazem, transitioned to IV amiodarone --> sinus rhythm.      SUBJECTIVE: Denies chest pain.  Breathing at baseline.  OBJECTIVE: No apparent distress     ROS:  Consist: Denies fevers, chills or night sweats  Heart: Denies chest pain, palpitations, lightheadedness, dizziness or syncope  Lungs: Denies SOB, wheezing, orthopnea or PND  GI: Denies abdominal pain, vomiting or diarrhea    PHYSICAL EXAM:   BP (!) 132/56   Pulse 65   Temp 97.5 °F (36.4 °C) (Oral)   Resp 24   Ht 1.575 m (5' 2\")   Wt 63.9 kg (140 lb 12.8 oz)   SpO2 96%   BMI 25.75 kg/m²    B/P Range last 24 hours: Systolic (24hrs), Av , Min:121 , Max:163    Diastolic (24hrs), Av, Min:49, Max:113    CONST: Well developed, well nourished who appears stated age. Awake, alert and cooperative. No apparent distress  HEENT:   Head- Normocephalic, atraumatic   Eyes- Conjunctivae pink, anicteric  Throat- Oral mucosa pink and moist  Neck-  No stridor, trachea midline, no jugular venous distention. No carotid bruit  CHEST: Chest symmetrical and non-tender to palpation. No accessory muscle use or intercostal retractions  RESPIRATORY:  Lung sounds -coarse breath sounds with scattered rhonchi   CARDIOVASCULAR:     Heart Inspection- shows no noted pulsations  Heart Palpation- no heaves or thrills; PMI is non-displaced   Heart Ausculation- Regular rate and rhythm, no murmur. No s3, s4 or rub   PV: No lower extremity edema. No varicosities. Pedal pulses palpable, no clubbing or cyanosis 
  Palliative Care Department  [phone]  Palliative Care Progress Note  Provider Dasia Izaguirre, APRN - CNP    Terra Mahan  [id number]  Hospital Day: 7  Date of Initial Consult: 6/28/2025  Referring Provider: Caitlin Porras MD  Palliative Medicine was consulted for assistance with: Goals of care discussion    HPI:   Terra Mahan is a 89 y.o. with a medical history of lung mass, hypertension, hyperlipidemia and arthritis who was admitted on 6/23/2025 from home with a CHIEF COMPLAINT of A-fib s/p bronchoscopy.  Patient presented to outpatient bronchoscopy today and underwent MBS with FNA biopsy by pulmonary.  Postprocedure patient went into A-fib with RVR associated with hypotension.  Patient admitted to ICU for further medical management.  Pathology findings s/p EBUS invasive moderately differentiated squamous cell carcinoma.  ASSESSMENT/PLAN:     Pertinent Hospital Diagnoses     NSTEMI  New onset A-fib RVR  Acute hypoxic respiratory failure secondary to mucous plugging on the right s/p [facility] 6/26  Non Small cell lung cancer with metastasis  Mediastinal and right hilar lymphadenopathy    Palliative Care Encounter / Counseling Regarding Goals of Care  Please see detailed goals of care discussion as below  At this time, Terra Mahan, Does Not have capacity for medical decision-making. Capacity is time limited and situation/question specific  During encounter Hill  was surrogate medical decision-maker  Outcome of goals of care meeting:   Change code status to DNR-CC  Hospice consult, family goal is [facility] hospice house  Comfort medications initiated  Code status DNR-CC  Advanced Directives: no POA or living will in epic  Surrogate/Legal NOK:  Hill Mahan (child) [phone]    Spiritual assessment: no spiritual distress identified  Bereavement and grief: to be determined  Referrals to: none today  SUBJECTIVE:     Current medical issues leading to Palliative Medicine involvement include   Active Hospital Problems    
  [facility] Quality Flow/Interdisciplinary Rounds Progress Note        Quality Flow Rounds held on June 29, 2025    Disciplines Attending:  Bedside Nurse, , , and Nursing Unit Leadership    Terra Mahan was admitted on 6/23/2025 10:58 AM    Anticipated Discharge Date:       Disposition:       Hayes Score:  Hayes Scale Score: 13    BS RISK OF UNPLANNED READMISSION 2.0             16.9 Total Score        Discussed patient goal for the day, patient clinical progression, and barriers to discharge.  The following Goal(s) of the Day/Commitment(s) have been identified:  Transfer patient, comfort, continue ICU care       Alverto Alatorre RN  June 29, 2025      
1710   patient flipped into high heart rate 180s with decreased blood pressure.   EKG done and ordered.   1713 adenosine 6 mg per anesthesia.1721   cardizem 15mg bolus given  given per anesthesia.   1725 cardizem 10 mg iv drip started.   1733 cardizem drip increased to 15 mg   1735  dr. Miles called and updated.   1735  Cardiologist Dr. Foley called and updated   Dr. Woodward awar of above and intensive drLeonardo On for rose and accept patient. 1755 [facility] accepted patient.perfect served hospitalist for care for patient.   Dr. Maria milanes marino.   1805 myrna rn calling report to micu [phone]>  1815 transported to [facility]  on monitor and o2 with family.   Care complete  
4 Eyes Skin Assessment     NAME:  Terra Mahan  YOB: 1935  MEDICAL RECORD NUMBER:  [id number]    The patient is being assessed for  Admission    I agree that at least one RN has performed a thorough Head to Toe Skin Assessment on the patient. ALL assessment sites listed below have been assessed.      Areas assessed by both nurses:    Head, Face, Ears, Shoulders, Back, Chest, Arms, Elbows, Hands, Sacrum. Buttock, Coccyx, Ischium, Legs. Feet and Heels, and Under Medical Devices         Does the Patient have a Wound? Yes wound(s) were present on assessment. LDA wound assessment was Initiated and completed by RN    L buttocks 1 X 1  Coccyx blanchable redness     Red blanchable boggy heels    Hayes Prevention initiated by RN: Yes  Wound Care Orders initiated by RN: No    Pressure Injury (Stage 3,4, Unstageable, DTI, NWPT, and Complex wounds) if present, place Wound referral order by RN under : No    New Ostomies, if present place, Ostomy referral order under : No     Nurse 1 eSignature: Electronically signed by Emilie Weinstein RN on 6/23/25 at 6:50 PM EDT    **SHARE this note so that the co-signing nurse can place an eSignature**    Nurse 2 eSignature: Electronically signed by Gela Bansal RN on 6/23/25 at 6:26 PM EDT  
Attempted to see patient for bedside eval.  Consulted with nurse prior  and she reports that the doctor wants to r/o aspiration as cause for patient's pneumonia.  She agreed that aspiration cannot be r/o at bedside and reports that the doctor wanted patient to have video swallow study.  Explained that this cannot be done til Monday.  That was agreeable. She will discontinue the order for the bedside as that is not what the doctor requested. She reports patient swallowing without any s/s of distress of any consistencies including medications.  Will follow up with swallow study on Monday.  Leonora Macedo MA/[facility]  [id number]    
Dr. Martinez here to check patient. Ok to give patient ice chips.  
Dr. Potts notified of consult.  
Events in PACU noted. Had hard time coming off O2 then developed afib with RVR requiring ultimately adenosine then diltiazem the amio with ultimate conversion to NSR. Now complaining of rattling in chest and some shortness of breath with minimal cough and no wheezing or chest pain. Has neck and right shoulder pain.  Additional Respiratory Assessments  Pulse: 65  Respirations: 22  SpO2: 91 %      Current Facility-Administered Medications:     guaiFENesin tablet 400 mg, 400 mg, Oral, TID, Rahel Smith MD, 400 mg at 06/24/25 0916    insulin lispro (HUMALOG,ADMELOG) injection vial 0-8 Units, 0-8 Units, SubCUTAneous, 4x Daily AC & HS, Jennifer Miller MD    lidocaine 4 % external patch 1 patch, 1 patch, TransDERmal, Once, Rahel Smith MD, 1 patch at 06/24/25 1138    budesonide (PULMICORT) nebulizer suspension 500 mcg, 0.5 mg, Nebulization, BID RT, Rahel Smith MD    levalbuterol (XOPENEX) nebulization 0.63 mg, 0.63 mg, Nebulization, Q8H PRN, Rahel Smith MD    scopolamine (TRANSDERM-SCOP) transdermal patch 1 patch, 1 patch, TransDERmal, Q72H, Kalyan Martinez MD, 1 patch at 06/23/25 1218    sodium chloride flush 0.9 % injection 5-40 mL, 5-40 mL, IntraVENous, 2 times per day, Ollie Ramos MD, 10 mL at 06/24/25 0852    sodium chloride flush 0.9 % injection 5-40 mL, 5-40 mL, IntraVENous, PRN, Ollie Ramos MD, 10 mL at 06/23/25 1939    0.9 % sodium chloride infusion, , IntraVENous, PRN, Ollie Ramos MD, Last Rate: 10 mL/hr at 06/24/25 1400, Rate Verify at 06/24/25 1400    ondansetron (ZOFRAN-ODT) disintegrating tablet 4 mg, 4 mg, Oral, Q8H PRN **OR** ondansetron (ZOFRAN) injection 4 mg, 4 mg, IntraVENous, Q6H PRN, Ollie Ramos MD    polyethylene glycol (GLYCOLAX) packet 17 g, 17 g, Oral, Daily PRN, Ollie Ramos MD    acetaminophen (TYLENOL) tablet 650 mg, 650 mg, Oral, Q6H PRN **OR** acetaminophen (TYLENOL) suppository 650 mg, 650 mg, Rectal, Q6H PRN, Ollie Ramos MD    heparin 
Floor Called, nurse to nurse given. Spoke with nelly stewart . Patients test results review, VS reported to receiving nurse. Any and all important information regarding patient disclosed.  
[facility] contacted, awaiting return call from hospice nurse.   
[facility]   Department of Internal Medicine   Internal Medicine Residency  MICU Progress Note    Patient:  Terra Mahan 89 y.o. female   MRN: [id number]       Date of Service: 2025   Admission date: 2025 10:58 AM ; Hospital day: 2   ICU day: 1d 13h    Allergy: Benazepril, Lipitor, Losartan, Pantoprazole, Pravachol [pravastatin sodium], and Zocor [simvastatin - high dose]    Subjective     Patient was seen and examined at bedside.  Today she continues to report right shoulder and neck pain.  She continues to be on 3 L of oxygen via nasal cannula, saturating well.  Vital stable and continues to be in sinus rhythm..  Denies chest pain, abdominal pain, palpitations.      Objective   PHYSICAL EXAM  General Appearance: Awake and alert, patient is in no distress  HEENT: Normocephalic, atraumatic  Neck: midline trachea  Lung: Rhonchi heard bilaterally, improving compared to yesterday  Heart: regular rate and rhythm, no murmur  Abdomen: soft, bowel sounds normal; no masses  Extremities: no cyanosis or edema  Neurologic: Mental status: Oriented x 3.  No focal neurologic deficits    CARDIOVASCULAR  Pulse rate range      : Pulse  Av.1  Min: 55  Max: 78  BP range                  : Systolic (24hrs), Av , Min:116 , Max:163   ; Diastolic (24hrs), Av, Min:49, Max:113    Arterial BP       Systolic BP/Diastolic BP & MAP            PULMONARY  Respiration range   : Resp  Av.6  Min: 11  Max: 26  Pulse Ox range : SpO2  Av.6 %  Min: 89 %  Max: 100 %  No results for input(s): \"PH\", \"PCO2\", \"PO2\", \"HCO3\", \"BE\", \"O2SAT\", \"THB\" in the last 72 hours.    Invalid input(s): \"PFRATIO\"       NEPHROLOGY (FLUIDS/ELECTROLYTES & NUTRITION)  Urine: 300 mL   I & O - 24hr:    Intake/Output Summary (Last 24 hours) at 2025 0815  Last data filed at 2025 0600  Gross per 24 hour   Intake 715.84 ml   Output 900 ml   Net -184.16 ml     Net IO Since Admission: 1,970.97 mL [06/25/25 6085]  Recent Labs     
Now breathing is fine but still has neck and right shoulder pain.  Additional Respiratory Assessments  Pulse: 62  Respirations: 17  SpO2: 100 %      Current Facility-Administered Medications:     guaiFENesin tablet 400 mg, 400 mg, Oral, TID, Rahel Smith MD, 400 mg at 06/25/25 0851    insulin lispro (HUMALOG,ADMELOG) injection vial 0-8 Units, 0-8 Units, SubCUTAneous, 4x Daily AC & HS, Jennifer Miller MD    budesonide (PULMICORT) nebulizer suspension 500 mcg, 0.5 mg, Nebulization, BID RT, Rahel Smith MD, 500 mcg at 06/25/25 0800    levalbuterol (XOPENEX) nebulization 0.63 mg, 0.63 mg, Nebulization, Q8H PRN, Rahel Smith MD    metoprolol succinate (TOPROL XL) extended release tablet 50 mg, 50 mg, Oral, Daily, Peggy Rowe APRN - CNP, 50 mg at 06/25/25 0851    aspirin chewable tablet 81 mg, 81 mg, Oral, Daily, Peggy Rowe APRN - CNP, 81 mg at 06/25/25 0851    rosuvastatin (CRESTOR) tablet 5 mg, 5 mg, Oral, Nightly, Peggy Rowe APRN - CNP, 5 mg at 06/24/25 2121    [START ON 7/2/2025] amiodarone (CORDARONE) tablet 200 mg, 200 mg, Oral, Daily, Peggy Rowe APRN - CNP    amiodarone (CORDARONE) tablet 200 mg, 200 mg, Oral, BID, Peggy Rowe APRN - CNP, 200 mg at 06/25/25 0851    scopolamine (TRANSDERM-SCOP) transdermal patch 1 patch, 1 patch, TransDERmal, Q72H, Kalyan Martinez MD, 1 patch at 06/23/25 1218    sodium chloride flush 0.9 % injection 5-40 mL, 5-40 mL, IntraVENous, 2 times per day, Ollie Ramos MD, 10 mL at 06/25/25 0852    sodium chloride flush 0.9 % injection 5-40 mL, 5-40 mL, IntraVENous, PRN, Ollie Ramos MD, 10 mL at 06/23/25 1939    0.9 % sodium chloride infusion, , IntraVENous, PRN, Ollie Ramos MD, Last Rate: 10 mL/hr at 06/24/25 1800, Rate Verify at 06/24/25 1800    ondansetron (ZOFRAN-ODT) disintegrating tablet 4 mg, 4 mg, Oral, Q8H PRN **OR** ondansetron (ZOFRAN) injection 4 mg, 4 mg, IntraVENous, Q6H PRN, Ollie Ramos MD    polyethylene glycol 
OT SESSION ATTEMPT     Date:2025  Patient Name: Terra Mahan  MRN: [id number]  : 1935  Room: [facility]/44-A     Attempted OT session this date:    [] unavailable due to other medical staff currently with pt   [] on hold, await MRI/ neurosurgical recommendations.   [] on hold per nursing staff secondary to lab / radiology results    [] declined Occupational Therapy  this date due to ___.  Benefits of participation in therapy reviewed with pt.    [x] off unit   [] Other:     Will reattempt OT session at a later time.    Mariam Edwards OTD, OTR/L, [id number]    
Patient admitted to MICU with socks.   
Patient admitted to PACU and placed on appropriate monitors. Patient on 40% face mask. Airway patent at this time. Report obtained from CRNA. Warm blankets applied.Pt verified using 2 Identifiers and ID band with OR staff prior to acceptance to PACU/Phase II care.   
Patient becoming more confused, refusing medical treatment, residents notified and no new orders at this time  
Patient comfort care, family at bedside, Patient resting in bed, no distress at this time, continue to monitor.   
Patient instructed on use of SMI. Patient has a weak effort. Patient's effort between 250 and 500 cc.  
Patient received the Sacrament of the Anointing of the Sick by Father Ted Guillaume on Tuesday, June 24, 2025.    If additional support is requested or needed please reach out to Spiritual Health ([phone]).    Chap. José Miguel Mendoza MDIV, BCC    
Patient resting in bed, appears comfortable, no concerns voiced, family at bedside, updated with hospice consult.   
Patient return to unit after cardiac stress test. Vitals stable.  
Patient signed out from anesthesia. VSS. Patient transferred to srda phase of care. Son in with patient.  
Patient transported to 8400 with belongings.  
Patient transported to [facility] accompanied by family members.  
Patient with coarse crackles upon auscultation. Resident aware. Stat CXR ordered.   
[facility]   Department of Internal Medicine   Internal Medicine Residency  MICU Progress Note    Patient:  Terra Mahan 89 y.o. female   MRN: [id number]       Date of Service: 6/26/2025   Admission date: 6/23/2025 10:58 AM ; Hospital day: 3   ICU day: 2d 20h    Allergy: Benazepril, Lipitor, Losartan, Pantoprazole, Pravachol [pravastatin sodium], and Zocor [simvastatin - high dose]    Subjective     Overnight events, patient undergo stress test, which was negative and there was no evidence of ischemia, transient ischemic dilation seen and moderate risk of cardiac events.  Dr. Foley called with concerns after procedures recommending chest x-ray.  Patient's respiratory status was stable.  Chest x-ray ordered and showed whiteout of the right lung with mediastinal shift to the right, patient stable with the same oxygen requirement of 2 L saturating 94% and no increased work of breathing.  3% saline added in chest vest.  Patient also undergo delirium and confusion, refusing medications and is scopolamine was discontinued.  Blood pressure was elevated and she received hydralazine and oral amlodipine was resumed.    Also continued to report shoulder pain and she was given a lidocaine patch and later during the night patient was pulling out her IVs and wanted to get up and walk and bedside  was ordered.  Potassium and magnesium replaced this morning    Today, patient was seen and examined at bedside.  Today she continues to report right shoulder and neck pain.  She continues to be on 3 L of oxygen via nasal cannula, saturating well.  Vital stable and continues to be in sinus rhythm..  Denies chest pain, abdominal pain, palpitations.      Objective   PHYSICAL EXAM  General Appearance: Awake and alert, patient is in no distress  HEENT: Normocephalic, atraumatic  Neck: midline trachea  Lung: Breath sounds on the right diminished at the right upper lobe compared to left lung, right middle and right lower 
Pharmacy Consultation Note  (Antibiotic Dosing and Monitoring)    Initial consult date: 6/28/2025  Consulting physician/provider: Caitlin Porras MD.   Drug: Vancomycin  Indication: Nosocomial PNA.     Age/  Gender Height Weight IBW  Allergy Information   89 y.o./female 157.5 cm (5' 2\") 59 kg (130 lb)     Ideal body weight: 50.1 kg (110 lb 7.2 oz)  Adjusted ideal body weight: 56.2 kg (123 lb 14.6 oz)   Benazepril, Lipitor, Losartan, Pantoprazole, Pravachol [pravastatin sodium], and Zocor [simvastatin - high dose]      Renal Function:  Recent Labs     06/26/25  0443 06/27/25  0945 06/28/25  0413   BUN 11 16 25*   CREATININE 0.7 0.7 0.9       Intake/Output Summary (Last 24 hours) at 6/28/2025 1019  Last data filed at 6/28/2025 0717  Gross per 24 hour   Intake 822.07 ml   Output 880 ml   Net -57.93 ml     Recent vancomycin administrations                     vancomycin (VANCOCIN) 1,500 mg in sodium chloride 0.9 % 250 mL IVPB ([id number]) (mg) 1,500 mg New Bag 06/28/25 1116                  Assessment:  Patient is a 89 y.o. female who has been initiated on vancomycin for nosocomial PNA.   Estimated Creatinine Clearance: 39 mL/min (based on SCr of 0.9 mg/dL).  To dose vancomycin, pharmacy will be utilizing [employer] calculation software for goal AUC/SDAIQ 400-600 mg/L-hr (predicted AUC/SADIQ = 486, Tr =14.9 mcg/mL).       Plan:  Will continue vancomycin 1000 mg IV every 24 hours  Will check vancomycin trough when indicated.   Will continue to monitor renal function   Pharmacy to follow      Raeann Rudolph, PharmD, BCIDP, BCPS 6/28/2025 11:39 AM  [phone]  
Pharmacy Consultation Note  (Antibiotic Dosing and Monitoring)    Initial consult date: 6/28/2025  Consulting physician/provider: Caitlin Porras MD.   Drug: Vancomycin  Indication: Nosocomial PNA.     Age/  Gender Height Weight IBW  Allergy Information   89 y.o./female 157.5 cm (5' 2\") 59 kg (130 lb)     Ideal body weight: 50.1 kg (110 lb 7.2 oz)  Adjusted ideal body weight: 56.7 kg (124 lb 14.6 oz)   Benazepril, Lipitor, Losartan, Pantoprazole, Pravachol [pravastatin sodium], and Zocor [simvastatin - high dose]      Renal Function:  Recent Labs     06/27/25  0945 06/28/25  0413 06/29/25  0406   BUN 16 25* 31*   CREATININE 0.7 0.9 0.9       Intake/Output Summary (Last 24 hours) at 6/29/2025 0948  Last data filed at 6/29/2025 0900  Gross per 24 hour   Intake 1298.68 ml   Output 245 ml   Net 1053.68 ml     Recent vancomycin administrations                     vancomycin (VANCOCIN) 1,500 mg in sodium chloride 0.9 % 250 mL IVPB (Acdx2Lot) (mg) 1,500 mg New Bag 06/28/25 1116                      Assessment:  Patient is a 89 y.o. female who has been initiated on vancomycin for nosocomial PNA.   Estimated Creatinine Clearance: 39 mL/min (based on SCr of 0.9 mg/dL).  To dose vancomycin, pharmacy will be utilizing [employer] calculation software for goal AUC/SADIQ 400-600 mg/L-hr (predicted AUC/SADIQ = 486, Tr =14.9 mcg/mL).   Renal function stable.  Scr 0.9 mg/dL.       Plan:  Will continue vancomycin 1000 mg IV every 24 hours  Will check vancomycin trough on (6/30) before dose at 10:00.   Will continue to monitor renal function   Pharmacy to follow      Raeann Rudolph, PharmD, BCIDP, BCPS 6/29/2025 9:49 AM  [phone]  
[facility]   Department of Internal Medicine   Internal Medicine Residency  MICU Progress Note    Patient:  Terra Mahan 89 y.o. female   MRN: [id number]       Date of Service: 2025   Admission date: 2025 10:58 AM ; Hospital day: 1   ICU day: 14h    Allergy: Benazepril, Lipitor, Losartan, Pantoprazole, Pravachol [pravastatin sodium], and Zocor [simvastatin - high dose]    Subjective     Overnight patient converted to sinus rhythm.  Labs significant for potassium of 2.8 and magnesium of 1.2, both were replaced.  Troponins were trended up EKG ordered with no ST changes.  Magnesium after replacement 1.5 and was replaced once again.    Today:    Patient was seen and examined at bedside.  She is on 3 L of oxygen via nasal cannula.  Saturating more than 90%.  Vitals were stable and she was in normal sinus rhythm.  Reports right-sided neck pain and increased \"mucus in her throat\".  Denies chest pain, abdominal pain or palpitations.        Objective   PHYSICAL EXAM  General Appearance: Awake and alert, patient is in no distress  HEENT: Normocephalic, atraumatic  Neck: midline trachea  Lung: Rhonchi heard bilaterally  Heart: regular rate and rhythm, no murmur  Abdomen: soft, bowel sounds normal; no masses  Extremities: no cyanosis or edema  Neurologic: Mental status: Oriented x 3.  No focal neurologic deficits    CARDIOVASCULAR  Pulse rate range      : Pulse  Av.9  Min: 58  Max: 175  BP range                  : Systolic (24hrs), Av , Min:68 , Max:153   ; Diastolic (24hrs), Av, Min:41, Max:86    Arterial BP       Systolic BP/Diastolic BP & MAP            PULMONARY  Respiration range   : Resp  Av.4  Min: 10  Max: 34  Pulse Ox range : SpO2  Av.3 %  Min: 87 %  Max: 100 %  No results for input(s): \"PH\", \"PCO2\", \"PO2\", \"HCO3\", \"BE\", \"O2SAT\", \"THB\" in the last 72 hours.    Invalid input(s): \"PFRATIO\"       NEPHROLOGY (FLUIDS/ELECTROLYTES & NUTRITION)      I & O - 
Physical Therapy    PT consult to evaluate/treat received and appreciated.  Pt chart reviewed and evaluation attempted.  Pt is currently off unit at time of attempt.  Will check back as able.  Thank you.        Sukhi Austin, PT, DPT   [id number]        
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   Procedure/Surgery:  BRONCHOSCOPY ENDOBRONCHIAL ULTRASOUND FINE NEEDLE ASPIRATION;  BRONCHOSCOPY BIOPSY BRONCHUS   Precautions:  Falls, O2, [place],    Equipment Needs:  TBD    SUBJECTIVE:    Pt lives with son and wife  in a 2 story home with basement with 4 step(s) to enter and 1 rail(s); bed/bath on main level.  Pt ambulated with SPC PTA.    OBJECTIVE:   Initial Evaluation  Date: 25  Treatment Short Term/ Long Term   Goals   AM-PAC 6 Clicks      Was pt agreeable to Eval/treatment? Yes      Does pt have pain? No c/o pain     Bed Mobility  Rolling: SBA   Supine to sit: SBA   Sit to supine: NT  Scooting: SBA   Rolling: Independent   Supine to sit: Independent   Sit to supine: Independent   Scooting: Independent    Transfers Sit to stand: Min A  Stand to sit: Min A  Stand pivot: Min A with SPC   Sit to stand: Independent   Stand to sit: Independent   Stand pivot: Modified Independent with AAD   Ambulation    10 feet x2 with SPC Min A  >100 feet with AAD Modified Independent     Stair negotiation: ascended and descended  NT  >2 steps with 1 rail Modified Independent     ROM BUE:  Per OT eval   BLE:  WFL     Strength BUE:  Per OT eval   BLE:  WFL     Balance Sitting EOB:  SBA   Dynamic Standing:  Min A with SPC   Sitting EOB:  Independent   Dynamic Standing:  Modified Independent with AAD     Pt is A & O x 4  RASS:  0  CAM-ICU:  NT  Sensation:  Pt 
Pulmonary Progress Note  6/26/2025 11:09 AM  Subjective:   Admit Date: 6/23/2025  PCP: Becca Amador MD  Interval History: Resting in bed  Family at bedside  Attempting to cough has had NTS suctioning very weak cough  Diet: Diet NPO denies any chest pain has been getting chest PT    Data:   Scheduled Meds:    senna  1 tablet Oral Nightly    acetylcysteine  600 mg Inhalation BID RT    [Held by provider] apixaban  2.5 mg Oral BID    dornase alpha  2.5 mg Inhalation Daily RT    sodium chloride (Inhalant)  4 mL Nebulization Q4H    levalbuterol  0.63 mg Nebulization Q4H    lidocaine  1 patch TransDERmal Daily    guaiFENesin  400 mg Oral TID    insulin lispro  0-8 Units SubCUTAneous 4x Daily AC & HS    budesonide  0.5 mg Nebulization BID RT    metoprolol succinate  50 mg Oral Daily    aspirin  81 mg Oral Daily    rosuvastatin  5 mg Oral Nightly    [START ON 7/2/2025] amiodarone  200 mg Oral Daily    amiodarone  200 mg Oral BID    sodium chloride flush  5-40 mL IntraVENous 2 times per day    amLODIPine  5 mg Oral Daily    [Held by provider] ezetimibe  10 mg Oral Daily       Continuous Infusions:    dextrose      sodium chloride Stopped (06/24/25 2105)       PRN Meds: glucose, dextrose bolus **OR** dextrose bolus, glucagon (rDNA), dextrose, prochlorperazine, levalbuterol, sodium chloride flush, sodium chloride, [Held by provider] ondansetron **OR** [Held by provider] ondansetron, polyethylene glycol, acetaminophen **OR** acetaminophen, heparin (porcine), heparin (porcine), ipratropium 0.5 mg-albuterol 2.5 mg    I/O last 3 completed shifts:  In: 182.4 [P.O.:60; I.V.:122.4]  Out: 800 [Urine:800]    I/O this shift:  In: 484.9 [P.O.:36; I.V.:30.9; IV Piggyback:418]  Out: -       Intake/Output Summary (Last 24 hours) at 6/26/2025 1109  Last data filed at 6/26/2025 1103  Gross per 24 hour   Intake 502.63 ml   Output 400 ml   Net 102.63 ml       Patient Vitals for the past 96 hrs (Last 3 readings):   Weight   06/26/25 
Pulmonary Progress Note  6/27/2025 3:54 PM  Subjective:   Admit Date: 6/23/2025  PCP: Becca Amador MD  Interval History:   Sitting on the chair, bedside.  Feeling better.  Unable to expectorate.  Afebrile  Diet: ADULT DIET; Regular denies any chest pain has been getting chest PT    Data:   Scheduled Meds:    senna  2 tablet Oral Nightly    methylPREDNISolone  40 mg IntraVENous Q12H    sodium phosphate IVPB (PERIPHERAL line)  20 mmol IntraVENous Once    acetylcysteine  600 mg Inhalation BID RT    apixaban  2.5 mg Oral BID    dornase alpha  2.5 mg Inhalation Daily RT    levalbuterol  0.63 mg Nebulization Q4H    tranexamic acid inj 100 mg/mL for  500 mg Nebulization BID    lidocaine  1 patch TransDERmal Daily    guaiFENesin  400 mg Oral TID    insulin lispro  0-8 Units SubCUTAneous 4x Daily AC & HS    budesonide  0.5 mg Nebulization BID RT    metoprolol succinate  50 mg Oral Daily    aspirin  81 mg Oral Daily    rosuvastatin  5 mg Oral Nightly    [START ON 7/2/2025] amiodarone  200 mg Oral Daily    amiodarone  200 mg Oral BID    sodium chloride flush  5-40 mL IntraVENous 2 times per day    amLODIPine  5 mg Oral Daily    [Held by provider] ezetimibe  10 mg Oral Daily       Continuous Infusions:    dextrose      sodium chloride Stopped (06/24/25 2105)       PRN Meds: glucose, dextrose bolus **OR** dextrose bolus, glucagon (rDNA), dextrose, prochlorperazine, sodium chloride flush, sodium chloride, polyethylene glycol, acetaminophen **OR** acetaminophen, ipratropium 0.5 mg-albuterol 2.5 mg    I/O last 3 completed shifts:  In: 936.7 [P.O.:480; I.V.:30.9; IV Piggyback:425.7]  Out: 1300 [Urine:1300]    I/O this shift:  In: 720 [P.O.:720]  Out: -       Intake/Output Summary (Last 24 hours) at 6/27/2025 1554  Last data filed at 6/27/2025 1350  Gross per 24 hour   Intake 720 ml   Output 900 ml   Net -180 ml       Patient Vitals for the past 96 hrs (Last 3 readings):   Weight   06/27/25 0600 65.4 kg (144 lb 1.6 oz) 
Pulmonary Progress Note  6/29/2025 12:03 PM  Subjective:   Admit Date: 6/23/2025  PCP: Becca Amador MD  Interval History:     Patient seen resting in bed on 8 L high flow nasal cannula, pulse oximetry 99%  Weaned to 6L while seeing patient, updated RN  Denies SOB. Reports cough  Bedside sitter at bedside    Diet: ADULT DIET; Regular     Data:   Scheduled Meds:    cefTRIAXone (ROCEPHIN) IV  2,000 mg IntraVENous Q24H    polyethylene glycol  17 g Oral Daily    acetylcysteine  600 mg Inhalation Q4H RT    vancomycin  1,000 mg IntraVENous Q24H    docusate sodium  100 mg Oral Daily    senna  2 tablet Oral Nightly    methylPREDNISolone  40 mg IntraVENous Q12H    apixaban  2.5 mg Oral BID    dornase alpha  2.5 mg Inhalation Daily RT    levalbuterol  0.63 mg Nebulization Q4H    lidocaine  1 patch TransDERmal Daily    guaiFENesin  400 mg Oral TID    insulin lispro  0-8 Units SubCUTAneous 4x Daily AC & HS    budesonide  0.5 mg Nebulization BID RT    metoprolol succinate  50 mg Oral Daily    aspirin  81 mg Oral Daily    rosuvastatin  5 mg Oral Nightly    [START ON 7/2/2025] amiodarone  200 mg Oral Daily    amiodarone  200 mg Oral BID    sodium chloride flush  5-40 mL IntraVENous 2 times per day    amLODIPine  5 mg Oral Daily    [Held by provider] ezetimibe  10 mg Oral Daily       Continuous Infusions:    dextrose      sodium chloride Stopped (06/24/25 2105)       PRN Meds: glucose, dextrose bolus **OR** dextrose bolus, glucagon (rDNA), dextrose, prochlorperazine, sodium chloride flush, sodium chloride, acetaminophen **OR** acetaminophen, ipratropium 0.5 mg-albuterol 2.5 mg    I/O last 3 completed shifts:  In: 1058.7 [P.O.:540; I.V.:99; IV Piggyback:419.7]  Out: 725 [Urine:725]    I/O this shift:  In: 240 [P.O.:240]  Out: 0       Intake/Output Summary (Last 24 hours) at 6/29/2025 1203  Last data filed at 6/29/2025 1000  Gross per 24 hour   Intake 762.61 ml   Output 245 ml   Net 517.61 ml       Patient Vitals for the 
Pulmonary Progress Note  6/30/2025 8:58 AM  Subjective:   Admit Date: 6/23/2025  PCP: Becca Amador MD  Interval History:     Patient seen resting in bed on 6 L high flow nasal cannula, pulse oximetry 99%  Congested, hard to expectorate mucus  Complains of pain intermittently, points to right midclavicle    Diet: ADULT DIET; Regular     Data:   Scheduled Meds:    polyethylene glycol  17 g Oral Daily    acetylcysteine  600 mg Inhalation Q4H RT    docusate sodium  100 mg Oral Daily    senna  2 tablet Oral Nightly    methylPREDNISolone  40 mg IntraVENous Q12H    dornase alpha  2.5 mg Inhalation Daily RT    levalbuterol  0.63 mg Nebulization Q4H    lidocaine  1 patch TransDERmal Daily    guaiFENesin  400 mg Oral TID    budesonide  0.5 mg Nebulization BID RT    metoprolol succinate  50 mg Oral Daily    [START ON 7/2/2025] amiodarone  200 mg Oral Daily    amiodarone  200 mg Oral BID       Continuous Infusions:         PRN Meds: morphine **OR** morphine, calcium carbonate, glucose, prochlorperazine, acetaminophen **OR** acetaminophen, ipratropium 0.5 mg-albuterol 2.5 mg    I/O last 3 completed shifts:  In: 1065.9 [P.O.:480; IV Piggyback:585.9]  Out: 745 [Urine:745]    No intake/output data recorded.      Intake/Output Summary (Last 24 hours) at 6/30/2025 0858  Last data filed at 6/29/2025 1800  Gross per 24 hour   Intake 725.53 ml   Output 500 ml   Net 225.53 ml       Patient Vitals for the past 96 hrs (Last 3 readings):   Weight   06/30/25 0104 64.3 kg (141 lb 12.1 oz)   06/29/25 0418 66.5 kg (146 lb 9.7 oz)   06/27/25 0600 65.4 kg (144 lb 1.6 oz)         Recent Labs     06/28/25  0413 06/29/25  0406   WBC 20.2* 15.3*   HGB 11.6 9.8*   HCT 34.4 29.8*   MCV 86.6 87.6    314     Recent Labs     06/27/25  0945 06/28/25  0413 06/29/25  0406   * 134* 132*   K 4.9 3.4* 4.2   CL 92* 94* 95*   CO2 20* 22 23   BUN 16 25* 31*   CREATININE 0.7 0.9 0.9   PHOS 2.3* 2.7 2.1*     6/28/25 sputum culture Citrobacter 
Radiation oncology called and stated an appointment has been set for Monday 30th at [facility] if patient is discharged by then.  
Report called to RN on 1282.  
Respiratory called for stat duoneb treatment.  
Spoke to Veronica, at Dr Ngo's office, regarding Plavix instructions. Patient is to hold Plavix for 3 days prior to Bronch.  
[facility]   Department of Internal Medicine   Internal Medicine Residency  MICU Progress Note    Patient:  Terra Mahan 89 y.o. female   MRN: [id number]       Date of Service: 2025   Admission date: 2025 10:58 AM ; Hospital day: 6   ICU day: 5d 14h    Allergy: Benazepril, Lipitor, Losartan, Pantoprazole, Pravachol [pravastatin sodium], and Zocor [simvastatin - high dose]    Subjective     Overnight events significant for changing of CODE status to LIMITED NO TO EVERYTHING. Phos replaced.     Today, patient was seen and examined at bedside, She reports \"not feeling great\". Her oxygen requirements went up since yesterday and she is currently on 7 L of O2 via nasal canula. She is in no distress.     Objective   PHYSICAL EXAM  General Appearance: Awake and alert, patient is in no distress  HEENT: Normocephalic, atraumatic  Neck: midline trachea  Lung: Rhonchi heard bilaterally, no wheezing  Heart: regular rate and rhythm, no murmur  Abdomen: soft, bowel sounds normal; no masses  Extremities: no cyanosis or edema  Neurologic: Mental status: Oriented x 3.  No focal neurologic deficits    CARDIOVASCULAR  Pulse rate range      : Pulse  Av.2  Min: 70  Max: 101  BP range                  : Systolic (24hrs), Av , Min:72 , Max:154   ; Diastolic (24hrs), Av, Min:46, Max:69    Arterial BP       Systolic BP/Diastolic BP & MAP            PULMONARY  Respiration range   : Resp  Av.8  Min: 16  Max: 34  Pulse Ox range : SpO2  Av.5 %  Min: 93 %  Max: 100 %  No results for input(s): \"PH\", \"PCO2\", \"PO2\", \"HCO3\", \"BE\", \"O2SAT\", \"THB\" in the last 72 hours.    Invalid input(s): \"PFRATIO\"       NEPHROLOGY (FLUIDS/ELECTROLYTES & NUTRITION)  Urine: 400 mL   I & O - 24hr:    Intake/Output Summary (Last 24 hours) at 2025 0833  Last data filed at 2025 0612  Gross per 24 hour   Intake 1058.68 ml   Output 245 ml   Net 813.68 ml     Net IO Since Admission: 3,025.72 mL [25 2950]  Recent 
[facility]   Department of Internal Medicine   Internal Medicine Residency  MICU Progress Note    Patient:  Terra Mahan 89 y.o. female   MRN: [id number]       Date of Service: 2025   Admission date: 2025 10:58 AM ; Hospital day: 4   ICU day: 3d 16h    Allergy: Benazepril, Lipitor, Losartan, Pantoprazole, Pravachol [pravastatin sodium], and Zocor [simvastatin - high dose]    Subjective     Overnight events significant for patient undergoing bronchoscopy  TXA for 4 doses  Steroid as bronch showed inflammation   X ray has shown improvement comparing than the previous one. Repeating X ray in the morning   Rad-onc consulted     Today, patient was seen and examined at bedside. She reports feeling so much better and her oxygen requirements are coming down, now at 2 L of O2 via nasal canula. She reports her shoulder/clavicle pain is not present today.     Objective   PHYSICAL EXAM  General Appearance: Awake and alert, patient is in no distress  HEENT: Normocephalic, atraumatic  Neck: midline trachea  Lung: Breath sounds are diminished on the right and absent on the right base. Breath sounds normal on the left  Heart: regular rate and rhythm, no murmur  Abdomen: soft, bowel sounds normal; no masses  Extremities: no cyanosis or edema  Neurologic: Mental status: Oriented x 3.  No focal neurologic deficits    CARDIOVASCULAR  Pulse rate range      : Pulse  Av.1  Min: 68  Max: 91  BP range                  : Systolic (24hrs), Av , Min:91 , Max:166   ; Diastolic (24hrs), Av, Min:52, Max:119    Arterial BP       Systolic BP/Diastolic BP & MAP            PULMONARY  Respiration range   : Resp  Av.9  Min: 16  Max: 28  Pulse Ox range : SpO2  Av.8 %  Min: 82 %  Max: 100 %  No results for input(s): \"PH\", \"PCO2\", \"PO2\", \"HCO3\", \"BE\", \"O2SAT\", \"THB\" in the last 72 hours.    Invalid input(s): \"PFRATIO\"       NEPHROLOGY (FLUIDS/ELECTROLYTES & NUTRITION)  Urine: 400 mL   I & O - 
Date Noted    Palliative care encounter [Z51.5] 06/28/2025    Goals of care, counseling/discussion [Z71.89] 06/28/2025    Atrial fibrillation with RVR (HCC) [I48.91] 06/23/2025    Pre-operative laboratory examination [Z01.812] 06/23/2025       Details of Conversation:      Patient seen at bedside.  Responds to her name being called several times.  She opens her eyes.  She does not answer any of my questions.  She appears comfortable in bed. MAR reviewed.  Received 1 dose of IV morphine in the past 24 hours.  Hospice consulted, will await their evaluation.  Palliative care will continue to follow for symptom management while admitted.  DNR CC.    OBJECTIVE:   Prognosis: Poor    Physical Exam:  BP (!) 124/40   Pulse 76   Temp 97.3 °F (36.3 °C) (Temporal)   Resp 22   Ht 1.575 m (5' 2\")   Wt 64.3 kg (141 lb 12.1 oz)   SpO2 95%   BMI 25.93 kg/m²   Constitutional:  Elderly, awake, alert, confused  Lungs: respirations unlabored, labored with exertion   Heart: RRR  Abd:  non distended  Psych: PAIGE  Neuro: Lethargic, confused, opens eyes    Objective data reviewed: labs, images, records, medication use, vitals, and chart    Discussed patient and the plan of care with the other IDT members: Palliative Medicine IDT Team, Primary Team, Floor Nurse, and Family    Time/Communication  Greater than 50% of time spent, total 25 minutes in counseling and coordination of care at the bedside regarding goals of care and diagnosis and prognosis.    Thank you for allowing Palliative Medicine to participate in the care of Terra Mahan.        
breathing pattern and techniques to improve independence/tolerance for self-care routine  * Functional transfer/mobility training/DME recommendations for increased independence, safety, and fall prevention  * Patient/Family education to increase follow through with safety techniques and functional independence  * Recommendation of environmental modifications for increased safety with functional transfers/mobility and ADLs  * Cognitive retraining/development of therapeutic activities to improve problem solving, judgement, memory, and attention for increased safety/participation in ADL/IADL tasks  * Sensory re-education to improve body/limb awareness, maintain/improve skin integrity, and improve hand/UE motor function  * Visual-perceptual training to improve environmental scanning, visual attention/focus, and oculomotor skills for increased safety/independence with functional transfers/mobility and ADLs  * Splinting/positioning for increased function, prevention of contractures, and improve skin integrity  * Therapeutic exercise to improve motor endurance, ROM, and functional strength for ADLs/functional transfers  * Therapeutic activities to facilitate/challenge dynamic balance, stand tolerance for increased safety and independence with ADLs  * Therapeutic activities to facilitate gross/fine motor skills for increased independence with ADLs  * Neuro-muscular re-education: facilitation of righting/equilibrium reactions, midline orientation, scapular stability/mobility, normalization of muscle tone, and facilitation of volitional active controled movement  * Positioning to improve skin integrity, interaction with environment and functional independence  * Delirium prevention/treatment  * Manual techniques for edema management      Recommended Adaptive Equipment: TBD     Home Living: Pt lives with son and wife  in a 2 story home with basement with 4 step(s) to enter and 1 rail(s); bed/bath on main level  Bathroom setup: 
06/28/25  0413   * 130* 134*   K 3.2* 4.9 3.4*   CL 99 92* 94*   MG 1.5* 1.7 1.9   PHOS  --  2.3* 2.7   CO2 26 20* 22   BUN 11 16 25*   CREATININE 0.7 0.7 0.9   ANIONGAP 11 18* 18*   GLUCOSE 110* 134* 133*   CALCIUM 9.9 10.0 9.5     No results for input(s): \"LACTA\" in the last 72 hours.  Recent Labs     06/28/25  1148   PROBNP 2,828*     No results for input(s): \"PH\", \"PCO2\", \"PO2\", \"HCO3\", \"BE\", \"O2SAT\" in the last 72 hours.    Imaging Studies:    XR CHEST PORTABLE   Final Result   Worsening of the airspace disease throughout the right lung in the interval   with slight increase seen in size of the right pleural effusion.         XR CHEST PORTABLE   Final Result   Moderate right-sided effusion and lower lobe infiltrate are unchanged with   volume loss in the right hemithorax.         XR CHEST PORTABLE   Final Result   Number after bronchoscopy.      2.  No pneumothorax, subcutaneous emphysema or pneumomediastinum.      3.  Diminish the right-sided pleural effusion, regain aeration for the mid   upper aspect of the right upper lobe.      4.  Continued opacification in the mid lower aspect of the right chest.      5.  There is lesser degree of shift of the heart and mediastinum to the   right-side.         XR CHEST PORTABLE   Final Result   No change in complete atelectasis of the right lung from mucous plug in the   right mainstem bronchus with stable prominent shift of the mediastinum   towards the right.         XR CHEST PORTABLE   Final Result   No change in complete opacification of the right hemithorax with prominent   shift of the mediastinum to the right. The findings are consistent with   complete atelectasis of the right lung resulting from a mucous plug in the   right mainstem bronchus.         XR CHEST PORTABLE   Final Result   Interval opacification of the right hemithorax with rightward mediastinal   shift indicating volume loss.  Findings are compatible with lobar collapse..         XR CHEST 
the order you arrive as there are many variables that are involved in patient preparation. Your patience is greatly appreciated as you wait for your nurse.   [x] Delays may occur with surgery and staff will make a sincere effort to keep you informed of delays. If any delays occur with your procedure, we apologize ahead of time for your inconvenience as we recognize the value of your time.   
Endobronchial secretions are seen in the lobar branch of the RML and RLL.3. Mediastinal and right hilar lymphadenopathy.  4. Moderate to large hiatal hernia.5. Left ventricular myocardial hypertrophy. Right lung mass necrotic right lower lobe mass with positive PET scan in the mediastinum.  5/12/2025 PET scan metabolically active RLL demonstrates areas of necrosis with metastasis to the right hilum, mediastinum, right upper lobe, right supraclavicular lymph nodes, solitary bone metastasis, paraesophageal lymph node.  5/21/2025 seen by radiation oncology    6/23 underwent EBUS bronchoscopy.  In PACU went into A-fib RVR.  Started on diltiazem switched to amiodarone  Chest x-ray right base consolidation small right effusion  6/24 right lower lobe atelectasis right lower lobe mass  6/25 chest x-ray right-sided whiteout  6/26 off IV heparin saturating 91% on room air.  Chest x-ray right side whiteout  6/27: 2L  6/28: 6-8L, chest x-ray with increased airspace disease on the right with increase of right effusion    Assessment/Plan:    Acute hypoxic respiratory failure  2/2 mucous plugging  Was on 2 L yesterday  Now up to 6 to 8 L  Wean O2 as able  Mucous plugging, Pneumonia  S/p bronchoscopy 6/26  Respiratory culture with Citrobacter Koseri  CXR this AM with worsening airspace disease on the right, O2 needs increased,   Increase frequency of Mucomyst to q4h, EZPAP nebs, Flutter  Chest PT ordered by critical care   Started on Vanc and cefepime this a.m. 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 
Problems:    * No resolved hospital problems. *    89 year old female recently referred to Dr. López for suspicion of lung cancer. Bronch with biopsy completed on 6/23-path pending. She was given Zometa 6/5 for hypercalcemia, resolved.   She has metastatic disease to the right clavicle. Per radiation oncology will plan to wait until official diagnosis is confirmed to formulate plan.     Patient presents to the hospital with new onset A-fib with RVR s/p bronch.      Plan:    - Monitor CBC. Anemia likely secondary to underlying malignancy  - Hep gtt initiated. Recommend transitioning to oral anticoagulation at d/c-recommendation per cardiology  - Stress test today  - Patient will need to be rescheduled for follow up with Dr. López. Our office will reach out to schedule.    Collaboration of care with Dr. Babcock    Electronically signed by CHLOE Abdi - CNP on 6/25/2025 at 12:32 PM    Addendum: CAse reviewed and staffed with NP and agree with above.    Venita Babcock MD    
rightward mediastinal   shift indicating volume loss.  Findings are compatible with lobar collapse..         XR CHEST PORTABLE   Final Result   1.  Right lower lobe large mass with secondary postobstructive atelectasis.   No significant change from 1 day earlier with the exception of enlarging   right pleural effusion, likely malignant pleural effusion.      2.  Clear left lung.      3.  Hiatal hernia, unchanged.         XR CHEST PORTABLE   Final Result   Right basilar consolidation and small right pleural effusion.         XR CHEST PORTABLE    (Results Pending)       Assessment:    Principal Problem:    Atrial fibrillation with RVR (HCC)  Active Problems:    Pre-operative laboratory examination  Resolved Problems:    * No resolved hospital problems. *    89 year old female recently referred to Dr. López for suspicion of lung cancer. Bronch with biopsy completed on 6/23-path pending. She was given Zometa 6/5 for hypercalcemia, resolved.   She has metastatic disease to the right clavicle. Per radiation oncology will plan to wait until official diagnosis is confirmed to formulate plan.      Patient presents to the hospital with new onset A-fib with RVR s/p bronch. She converted to NSR 6/23.    6/25 s/p stress test    Plan:    - Monitor CBC. Anemia likely secondary to underlying malignancy  - Hep gtt initiated. Plan to transition to Eliquis when cleared by cardiology  - Pulm following, per primary d/c home when cleared by pulm  - Patient will need to be rescheduled for follow up with Dr. López. Our office will reach out to schedule. [facility] will sign off. Please call our office if there are any further needs while inpatient.      Collaboration of care with Dr. Babccok    Electronically signed by CHLOE Abdi - CNP on 6/26/2025 at 11:40 AM    Addendum: CAse reviewed and staffed with NP and agree with above.    Venita Babcock MD    
mg at 06/28/25 0916    dornase alpha (PULMOZYME) nebulizer solution 2.5 mg  2.5 mg Inhalation Daily RT Jennifer Miller MD   2.5 mg at 06/29/25 0829    levalbuterol (XOPENEX) nebulization 0.63 mg  0.63 mg Nebulization Q4H Jennifer Miller MD   0.63 mg at 06/29/25 0804    glucose chewable tablet 16 g  4 tablet Oral PRN Rahel Smith MD        dextrose bolus 10% 125 mL  125 mL IntraVENous PRN Rahel Smith MD        Or    dextrose bolus 10% 250 mL  250 mL IntraVENous PRN Rahel Smith MD        glucagon injection 1 mg  1 mg SubCUTAneous PRN Rahel Smith MD        dextrose 10 % infusion   IntraVENous Continuous PRN Rahel Smith MD        prochlorperazine (COMPAZINE) injection 10 mg  10 mg IntraVENous Q6H PRN Bharat Beaver MD        lidocaine 4 % external patch 1 patch  1 patch TransDERmal Daily Dionne Bee MD   1 patch at 06/29/25 0849    guaiFENesin tablet 400 mg  400 mg Oral TID Rahel Smith MD   400 mg at 06/29/25 0848    insulin lispro (HUMALOG,ADMELOG) injection vial 0-8 Units  0-8 Units SubCUTAneous 4x Daily AC & HS Jennifer Miller MD   4 Units at 06/28/25 1706    budesonide (PULMICORT) nebulizer suspension 500 mcg  0.5 mg Nebulization BID RT Rahel Smith MD   500 mcg at 06/29/25 0804    metoprolol succinate (TOPROL XL) extended release tablet 50 mg  50 mg Oral Daily Peggy Rowe APRN - CNP   50 mg at 06/29/25 0845    aspirin chewable tablet 81 mg  81 mg Oral Daily Peggy Rowe APRN - CNP   81 mg at 06/29/25 0845    rosuvastatin (CRESTOR) tablet 5 mg  5 mg Oral Nightly Peggy Rowe APRN - CNP   5 mg at 06/28/25 2129    [START ON 7/2/2025] amiodarone (CORDARONE) tablet 200 mg  200 mg Oral Daily Peggy Rowe APRN - CNP        amiodarone (CORDARONE) tablet 200 mg  200 mg Oral BID Peggy Rowe APRN - CNP   200 mg at 06/29/25 0846    sodium chloride flush 0.9 % injection 5-40 mL  5-40 mL IntraVENous 2 times per day Ollie Ramos MD   10 mL at

## 2025-07-02 ENCOUNTER — TELEPHONE (OUTPATIENT)
Dept: RADIATION ONCOLOGY | Age: 89
End: 2025-07-02

## 2025-07-02 NOTE — TELEPHONE ENCOUNTER
Called [facility] to check and see if the patient was in pain and her and her fmily would like for the patient to receive palliative radiation therapy as her radiation plan is now ready. Spoke with Sabi, who spoke with the family. Family stated the patient has had a significant decline in her health since her consultation with us and felt the treatment was no longer necessary. Dr. Dan and therapists updated.

## 2025-07-03 LAB — SURGICAL PATHOLOGY REPORT: NORMAL

## (undated) DEVICE — ENDOSCOPIC CYTOLOGY BRUSH: Brand: CELLEBRITY

## (undated) DEVICE — NEEDLE HYPO 25GA L1.5IN BLU POLYPR HUB S STL REG BVL STR

## (undated) DEVICE — TROCAR: Brand: KII FIOS FIRST ENTRY

## (undated) DEVICE — AGENT HEMSTAT W2XL4IN OXIDIZED REGENERATED CELOS ABSRB

## (undated) DEVICE — GARMENT,MEDLINE,DVT,INT,CALF,MED, GEN2: Brand: MEDLINE

## (undated) DEVICE — SINGLE USE BIOPSY VALVE MAJ-210: Brand: SINGLE USE BIOPSY VALVE (STERILE)

## (undated) DEVICE — INSUFFLATION NEEDLE TO ESTABLISH PNEUMOPERITONEUM.: Brand: INSUFFLATION NEEDLE

## (undated) DEVICE — SOLUTION IRRIG 3000ML 0.9% SOD CHL USP UROMATIC PLAS CONT

## (undated) DEVICE — Device: Brand: BALLOON

## (undated) DEVICE — SYRINGE MED 10ML LUERLOCK TIP W/O SFTY DISP

## (undated) DEVICE — MARKER,SKIN,WI/RULER AND LABELS: Brand: MEDLINE

## (undated) DEVICE — DRAPE C ARM W41XL65IN UNIV W/ CLP AND RUBBERBAND

## (undated) DEVICE — FORMALIN  10%NBF 60ML PREFLL CONT

## (undated) DEVICE — LIQUIBAND RAPID ADHESIVE 36/CS 0.8ML: Brand: MEDLINE

## (undated) DEVICE — COVER,LIGHT HANDLE,FLX,1/PK: Brand: MEDLINE INDUSTRIES, INC.

## (undated) DEVICE — BRONCHOSCOPY PACK: Brand: MEDLINE INDUSTRIES, INC.

## (undated) DEVICE — GOWN,SIRUS,NONRNF,SETINSLV,XL,20/CS: Brand: MEDLINE

## (undated) DEVICE — SYRINGE 20ML LL S/C 50

## (undated) DEVICE — [HIGH FLOW INSUFFLATOR,  DO NOT USE IF PACKAGE IS DAMAGED,  KEEP DRY,  KEEP AWAY FROM SUNLIGHT,  PROTECT FROM HEAT AND RADIOACTIVE SOURCES.]: Brand: PNEUMOSURE

## (undated) DEVICE — PUMP SUC IRR TBNG L10FT W/ HNDPC ASSEMB STRYKEFLOW 2

## (undated) DEVICE — CLOTH SURG PREP PREOPERATIVE CHLORHEXIDINE GLUC 2% READYPREP

## (undated) DEVICE — LAPAROSCOPIC SCISSORS: Brand: EPIX LAPAROSCOPIC SCISSORS

## (undated) DEVICE — SET EXTN PRIMING 0.7ML L7IN TBNG STD BOR PRSS RATE PUR STRP MP5303-C

## (undated) DEVICE — SINGLE USE SUCTION VALVE MAJ-209: Brand: SINGLE USE SUCTION VALVE (STERILE)

## (undated) DEVICE — ANCHOR TISSUE RETRIEVAL SYSTEM, BAG SIZE 175 ML, PORT SIZE 10 MM: Brand: ANCHOR TISSUE RETRIEVAL SYSTEM

## (undated) DEVICE — SYRINGE MED 10ML TRNSLUC BRL PLUNG BLK MRK POLYPR CTRL

## (undated) DEVICE — PACK SURG LAP CHOLE CUSTOM

## (undated) DEVICE — NEEDLE ASPIR 21GA L700MM US GUID TREAT DST END FOR EFFICIENT

## (undated) DEVICE — GLOVE ORANGE PI 7 1/2   MSG9075

## (undated) DEVICE — PMI PTFE COATED LAPAROSCOPIC WIRE L-HOOK 44 CM: Brand: PMI

## (undated) DEVICE — ADAPTER TBNG DIA15MM SWVL FBROPT BRONCHSCP TERM 2 AXIS PEEP

## (undated) DEVICE — PMI PTFE COATED LAPAROSCOPIC WIRE L-HOOK 33 CM: Brand: PMI

## (undated) DEVICE — AIR/WATER CLEANING ADAPTER FOR OLYMPUS® GI ENDOSCOPE: Brand: BULLDOG®

## (undated) DEVICE — COOK ENDOSCOPIC CHOLANGIOGRAPHY SET: Brand: COOK

## (undated) DEVICE — SINGLE-USE BIOPSY FORCEPS: Brand: RADIAL JAW 4

## (undated) DEVICE — BASIC DOUBLE BASIN 2-LF: Brand: MEDLINE INDUSTRIES, INC.

## (undated) DEVICE — SUTURE ABSRB L6IN L37MM 0 GS-21 GRN 1/2 CIR TAPR PNT NDL VLOCL0306

## (undated) DEVICE — MEDIA CONTRAST ISOVUE GLASS VIALS 250 50ML

## (undated) DEVICE — APPLICATOR MEDICATED 26 CC SOLUTION HI LT ORNG CHLORAPREP

## (undated) DEVICE — TROCAR: Brand: KII SLEEVE